# Patient Record
Sex: FEMALE | Race: BLACK OR AFRICAN AMERICAN | Employment: UNEMPLOYED | URBAN - METROPOLITAN AREA
[De-identification: names, ages, dates, MRNs, and addresses within clinical notes are randomized per-mention and may not be internally consistent; named-entity substitution may affect disease eponyms.]

---
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
## 2022-02-08 ENCOUNTER — HOSPITAL ENCOUNTER (EMERGENCY)
Age: 22
Discharge: HOME | End: 2022-02-08
Attending: EMERGENCY MEDICINE
Payer: COMMERCIAL

## 2022-02-08 VITALS
HEIGHT: 62 IN | DIASTOLIC BLOOD PRESSURE: 81 MMHG | RESPIRATION RATE: 18 BRPM | WEIGHT: 180 LBS | TEMPERATURE: 99.1 F | BODY MASS INDEX: 33.13 KG/M2 | HEART RATE: 86 BPM | OXYGEN SATURATION: 100 % | SYSTOLIC BLOOD PRESSURE: 129 MMHG

## 2022-02-08 DIAGNOSIS — N76.0 BACTERIAL VAGINITIS: ICD-10-CM

## 2022-02-08 DIAGNOSIS — B96.89 BACTERIAL VAGINITIS: ICD-10-CM

## 2022-02-08 DIAGNOSIS — B37.31 YEAST VAGINITIS: ICD-10-CM

## 2022-02-08 DIAGNOSIS — A59.9 TRICHOMONAS INFECTION: Primary | ICD-10-CM

## 2022-02-08 LAB
CANDIDA SPECIES, DNA PROBE: POSITIVE
GARDNERELLA VAGINALIS, DNA PROBE: POSITIVE
HCG(URINE) PREGNANCY TEST: NEGATIVE
SOURCE: ABNORMAL
TRICHOMONAS VAGINALIS DNA: POSITIVE

## 2022-02-08 PROCEDURE — 99283 EMERGENCY DEPT VISIT LOW MDM: CPT

## 2022-02-08 PROCEDURE — 87480 CANDIDA DNA DIR PROBE: CPT

## 2022-02-08 PROCEDURE — 87510 GARDNER VAG DNA DIR PROBE: CPT

## 2022-02-08 PROCEDURE — 87591 N.GONORRHOEAE DNA AMP PROB: CPT

## 2022-02-08 PROCEDURE — 87660 TRICHOMONAS VAGIN DIR PROBE: CPT

## 2022-02-08 PROCEDURE — 87491 CHLMYD TRACH DNA AMP PROBE: CPT

## 2022-02-08 PROCEDURE — 81025 URINE PREGNANCY TEST: CPT

## 2022-02-08 RX ORDER — FLUCONAZOLE 150 MG/1
150 TABLET ORAL ONCE
Qty: 1 TABLET | Refills: 0 | Status: SHIPPED | OUTPATIENT
Start: 2022-02-08 | End: 2022-02-08

## 2022-02-08 RX ORDER — METRONIDAZOLE 500 MG/1
500 TABLET ORAL 2 TIMES DAILY
Qty: 14 TABLET | Refills: 0 | Status: SHIPPED | OUTPATIENT
Start: 2022-02-08 | End: 2022-02-15 | Stop reason: ALTCHOICE

## 2022-02-08 ASSESSMENT — ENCOUNTER SYMPTOMS
VOMITING: 0
SHORTNESS OF BREATH: 0
NAUSEA: 0
BACK PAIN: 0
ABDOMINAL PAIN: 0
COUGH: 0
DIARRHEA: 0
RHINORRHEA: 0

## 2022-02-09 LAB
C TRACH DNA GENITAL QL NAA+PROBE: NEGATIVE
N. GONORRHOEAE DNA: NEGATIVE
SPECIMEN DESCRIPTION: NORMAL

## 2022-02-09 NOTE — ED NOTES
The following labs labeled with pt sticker and tubed to lab:     [] Blue     [] Lavender   [] on ice  [] Green/yellow  [] Green/black [] on ice  [] Yellow  [] Red  [] Pink      [] COVID-19 swab    [] Rapid  [] PCR  [] Flu swab  [] Peds Viral Panel     [x] Urine Sample  [x] Pelvic Cultures  [] Blood Cultures          [place] LPN  01/91/71 [id number]

## 2022-02-09 NOTE — ED TRIAGE NOTES
Pt arrived to ED with c/o blister on lower lip and also on vaginal region. Pt states this started about 1 month ago and has had unprotected sexual intercourse and partner recently tested positive for herpes. 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 Assessment and treatment for this patient was primarily performed by resident and attending with extended triage nurse performing tasks as directed. Pt seen primarily by resident and attending physicians. RN assessment deferred to physician assessment.

## 2022-02-09 NOTE — ED PROVIDER NOTES
Other Topics Concern    Not on file   Social History Narrative    Not on file     Social Determinants of Health     Financial Resource Strain:     Difficulty of Paying Living Expenses: Not on file   Food Insecurity:     Worried About Running Out of Food in the Last Year: Not on file    Lit of Food in the Last Year: Not on file   Transportation Needs:     Lack of Transportation (Medical): Not on file    Lack of Transportation (Non-Medical): Not on file   Physical Activity:     Days of Exercise per Week: Not on file    Minutes of Exercise per Session: Not on file   Stress:     Feeling of Stress : Not on file   Social Connections:     Frequency of Communication with Friends and Family: Not on file    Frequency of Social Gatherings with Friends and Family: Not on file    Attends Rastafarian Services: Not on file    Active Member of [address] [place] [address] or Organizations: Not on file    Attends Club or Organization Meetings: Not on file    Marital Status: Not on file   Intimate Partner Violence:     Fear of Current or Ex-Partner: Not on file    Emotionally Abused: Not on file    Physically Abused: Not on file    Sexually Abused: Not on file   Housing Stability:     Unable to Pay for Housing in the Last Year: Not on file    Number of [place] in the Last Year: Not on file    Unstable Housing in the Last Year: Not on file       History reviewed. No pertinent family history. Allergies:  Patient has no known allergies. Home Medications:  Prior to Admission medications    Medication Sig Start Date End Date Taking?  Authorizing Provider   metroNIDAZOLE (FLAGYL) 500 MG tablet Take 1 tablet by mouth 2 times daily for 7 days 2/8/22 2/15/22 Yes Sharon Glynn DO   ondansetron (ZOFRAN ODT) 4 MG disintegrating tablet Take 1 tablet by mouth every 8 hours as needed for Nausea 7/13/21   Nitesh Jim MD   ibuprofen (ADVIL;MOTRIN) 600 MG tablet Take 1 tablet by mouth every 6 hours as needed for Pain 4/21/16   Louie Agosto MD       REVIEW OFSYSTEMS    (2-9 systems for level 4, 10 or more for level 5)      Review of Systems   Constitutional: Negative for chills and fever. HENT: Positive for mouth sores. Negative for congestion and rhinorrhea. Eyes: Negative for visual disturbance. Respiratory: Negative for cough and shortness of breath. Cardiovascular: Negative for chest pain. Gastrointestinal: Negative for abdominal pain, diarrhea, nausea and vomiting. Genitourinary: Positive for genital sores. Negative for dysuria. Musculoskeletal: Negative for back pain and neck pain. Skin: Negative for rash. Neurological: Negative for weakness, numbness and headaches. PHYSICAL EXAM   (up to 7 for level 4, 8 or more forlevel 5)      INITIAL VITALS:   ED Triage Vitals   BP Temp Temp src Pulse Resp SpO2 Height Weight   -- -- -- -- -- -- -- --     Vitals:    02/08/22 1939   BP: 129/81   Pulse: 86   Resp: 18   Temp: 99.1 °F (37.3 °C)   TempSrc: Oral   SpO2: 100%   Weight: 180 lb (81.6 kg)   Height: 5' 2\" (1.575 m)       Physical Exam  Constitutional:       General: She is not in acute distress. Appearance: Normal appearance. She is normal weight. She is not ill-appearing, toxic-appearing or diaphoretic. HENT:      Head: Normocephalic and atraumatic. Nose: Nose normal.      Mouth/Throat:      Mouth: Mucous membranes are moist.      Pharynx: Oropharynx is clear. No oropharyngeal exudate or posterior oropharyngeal erythema. Eyes:      Extraocular Movements: Extraocular movements intact. Pupils: Pupils are equal, round, and reactive to light. Cardiovascular:      Rate and Rhythm: Normal rate and regular rhythm. Heart sounds: Normal heart sounds. No murmur heard. Pulmonary:      Effort: Pulmonary effort is normal. No respiratory distress. Breath sounds: Normal breath sounds. No wheezing, rhonchi or rales. Abdominal:      General: There is no distension. Palpations: Abdomen is soft. Tenderness: There is no abdominal tenderness. There is no guarding or rebound. Genitourinary:     Comments: Pelvic exam performed with RN and attending in the room. 3 small areas of induration noted on the posterior labia bilaterally. Mild tenderness when pushing on these. No obvious ulcer or drainage. Moderate amount of thick white discharge noted in the vaginal canal.  Cervix is normal.  No other abnormal findings. Musculoskeletal:         General: No tenderness. Normal range of motion. Cervical back: Normal range of motion and neck supple. Right lower leg: No edema. Left lower leg: No edema. Skin:     General: Skin is warm and dry. Neurological:      General: No focal deficit present. Mental Status: She is alert and oriented to person, place, and time. Motor: No weakness. Psychiatric:         Mood and Affect: Mood normal.         DIFFERENTIAL  DIAGNOSIS     PLAN (LABS / IMAGING / EKG):  Orders Placed This Encounter   Procedures    C.trachomatis N.gonorrhoeae DNA    VAGINITIS DNA PROBE    PREGNANCY, URINE       MEDICATIONS ORDERED:  Orders Placed This Encounter   Medications    metroNIDAZOLE (FLAGYL) 500 MG tablet     Sig: Take 1 tablet by mouth 2 times daily for 7 days     Dispense:  14 tablet     Refill:  0    fluconazole (DIFLUCAN) 150 MG tablet     Sig: Take 1 tablet by mouth once for 1 dose     Dispense:  1 tablet     Refill:  0     Initial MDM/Plan/ED COURSE:    25 y.o. female who presents with concerns of vaginal lesions and on the lip. On exam, patient is in no acute distress and vitals are stable and within normal limits. Abdomen soft and nontender. Heart and lung exam unremarkable. HEENT exam notable for small ulcer-like area on the right corner of the lips. This is nonpainful. Small amount of crusting overlying. No intraoral lesions.   Mild tonsillar hypertrophy appears to be normal variant, no signs of erythema or exudates. No associated sore throat to suggest bacterial infection. Pelvic exam was performed and lesions do not appear to be herpes. These appear to be more like folliculitis and there are only 2 or 3. No ulcerative lesions. No lesions within the vaginal canal.  Vaginal swab sent. ED Course as of 02/09/22 1315   Tue Feb 08, 2022 2024 HCG(Urine) Pregnancy Test: NEGATIVE [JS]      ED Course User Index  [JS] Hailey Long DO      Patient updated on laboratory findings including positive test for trichomonas, bacterial vaginosis, and yeast.  We discussed appropriate treatment including 1 week of Flagyl followed by fluconazole. We also discussed following up on gonorrhea and chlamydia test.  She deferred prophylactic treatment today. We also discussed the necessary treatment of sexual partners. She expressed agreement and questions were answered. Patient discharged home in stable condition. DIAGNOSTIC RESULTS / EMERGENCYDEPARTMENT COURSE / MDM     LABS:  Labs Reviewed   VAGINITIS DNA PROBE - Abnormal; Notable for the following components:       Result Value    Trichomonas Vaginalis DNA POSITIVE (*)     GARDNERELLA VAGINALIS, DNA PROBE POSITIVE (*)     CANDIDA SPECIES, DNA PROBE POSITIVE (*)     All other components within normal limits   C.TRACHOMATIS N.GONORRHOEAE DNA   PREGNANCY, URINE           No results found. EKG      All EKG's are interpreted by the Emergency Department Physicianwho either signs or Co-signs this chart in the absence of a cardiologist.      PROCEDURES:  None    CONSULTS:  None    CRITICAL CARE:  Please see attending note    FINAL IMPRESSION      1. Trichomonas infection    2. Yeast vaginitis    3.  Bacterial vaginitis          DISPOSITION / PLAN     DISPOSITION Decision To Discharge 02/08/2022 09:11:33 PM      PATIENT REFERRED TO:  [facility]  [address] [place] [address]  [phone]    If symptoms worsen      DISCHARGE MEDICATIONS:  Discharge Medication List as of 2/8/2022  9:15 PM      START taking these medications    Details   metroNIDAZOLE (FLAGYL) 500 MG tablet Take 1 tablet by mouth 2 times daily for 7 days, Disp-14 tablet, R-0Print      fluconazole (DIFLUCAN) 150 MG tablet Take 1 tablet by mouth once for 1 dose, Disp-1 tablet, R-0Print             Chaz Fraire DO  Emergency Medicine Resident    (Please note that portions of this note were completed with a voice recognition program.Efforts were made to edit the dictations but occasionally words are mis-transcribed.)       Chaz Fraire DO  Resident  02/09/22 0436

## 2022-02-09 NOTE — ED PROVIDER NOTES
[facility] ED     Emergency Department     Faculty Attestation    I performed a history and physical examination of the patient and discussed management with the resident. I reviewed the residents note and agree with the documented findings and plan of care. Any areas of disagreement are noted on the chart. I was personally present for the key portions of any procedures. I have documented in the chart those procedures where I was not present during the key portions. I have reviewed the emergency nurses triage note. I agree with the chief complaint, past medical history, past surgical history, allergies, medications, social and family history as documented unless otherwise noted below. For Physician Assistant/ Nurse Practitioner cases/documentation I have personally evaluated this patient and have completed at least one if not all key elements of the E/M (history, physical exam, and MDM). Additional findings are as noted. Presents with a couple of lesions to her groin and labia area as well as the lesion to her right upper lip. She says that the groin lesions have been there for over 2 months. She says that they are not painful. She says that the lip lesion is not painful either. Patient says that her significant other has been diagnosed with herpes. On exam, patient is resting comfortably in the bed. There is a small ulcer seen to the right upper lip at the corner. Patient does have dry lips. No lesions seen to the oropharynx. There are also 2 ulcerative type lesions to the labia majora on the right side. No vesicles. These appear consistent with a folliculitis. Patient is requesting testing for gonorrhea and chlamydia. Will await those labs.       Starr Grande MD  Attending Emergency  Physician              Laura Ramos MD  02/08/22 [id number]

## 2022-02-15 ENCOUNTER — OFFICE VISIT (OUTPATIENT)
Dept: INTERNAL MEDICINE CLINIC | Age: 22
End: 2022-02-15
Payer: COMMERCIAL

## 2022-02-15 VITALS
HEIGHT: 62 IN | HEART RATE: 80 BPM | WEIGHT: 172 LBS | BODY MASS INDEX: 31.65 KG/M2 | SYSTOLIC BLOOD PRESSURE: 102 MMHG | DIASTOLIC BLOOD PRESSURE: 80 MMHG | OXYGEN SATURATION: 98 %

## 2022-02-15 DIAGNOSIS — Z11.59 NEED FOR HEPATITIS C SCREENING TEST: ICD-10-CM

## 2022-02-15 DIAGNOSIS — A59.9 TRICHOMONAS INFECTION: ICD-10-CM

## 2022-02-15 DIAGNOSIS — R53.83 FATIGUE, UNSPECIFIED TYPE: ICD-10-CM

## 2022-02-15 DIAGNOSIS — Z11.4 ENCOUNTER FOR SCREENING FOR HIV: ICD-10-CM

## 2022-02-15 DIAGNOSIS — Z87.09 HISTORY OF STREP SORE THROAT: ICD-10-CM

## 2022-02-15 DIAGNOSIS — J35.1 ENLARGED TONSILS: ICD-10-CM

## 2022-02-15 DIAGNOSIS — R73.09 ELEVATED GLUCOSE: ICD-10-CM

## 2022-02-15 DIAGNOSIS — Z76.89 ENCOUNTER TO ESTABLISH CARE: Primary | ICD-10-CM

## 2022-02-15 DIAGNOSIS — Z86.2 HISTORY OF ANEMIA: ICD-10-CM

## 2022-02-15 PROCEDURE — G8484 FLU IMMUNIZE NO ADMIN: HCPCS | Performed by: NURSE PRACTITIONER

## 2022-02-15 PROCEDURE — G8417 CALC BMI ABV UP PARAM F/U: HCPCS | Performed by: NURSE PRACTITIONER

## 2022-02-15 PROCEDURE — 99203 OFFICE O/P NEW LOW 30 MIN: CPT | Performed by: NURSE PRACTITIONER

## 2022-02-15 PROCEDURE — 1036F TOBACCO NON-USER: CPT | Performed by: NURSE PRACTITIONER

## 2022-02-15 PROCEDURE — G8427 DOCREV CUR MEDS BY ELIG CLIN: HCPCS | Performed by: NURSE PRACTITIONER

## 2022-02-15 SDOH — ECONOMIC STABILITY: FOOD INSECURITY: WITHIN THE PAST 12 MONTHS, YOU WORRIED THAT YOUR FOOD WOULD RUN OUT BEFORE YOU GOT MONEY TO BUY MORE.: SOMETIMES TRUE

## 2022-02-15 SDOH — ECONOMIC STABILITY: FOOD INSECURITY: WITHIN THE PAST 12 MONTHS, THE FOOD YOU BOUGHT JUST DIDN'T LAST AND YOU DIDN'T HAVE MONEY TO GET MORE.: SOMETIMES TRUE

## 2022-02-15 ASSESSMENT — ENCOUNTER SYMPTOMS
COUGH: 0
ABDOMINAL PAIN: 0
COLOR CHANGE: 0
NAUSEA: 0
CONSTIPATION: 0
SORE THROAT: 0
WHEEZING: 0
TROUBLE SWALLOWING: 0
DIARRHEA: 0
EYE DISCHARGE: 0
BACK PAIN: 0
SHORTNESS OF BREATH: 0

## 2022-02-15 ASSESSMENT — PATIENT HEALTH QUESTIONNAIRE - PHQ9
SUM OF ALL RESPONSES TO PHQ9 QUESTIONS 1 & 2: 0
SUM OF ALL RESPONSES TO PHQ QUESTIONS 1-9: 0
2. FEELING DOWN, DEPRESSED OR HOPELESS: 0
SUM OF ALL RESPONSES TO PHQ QUESTIONS 1-9: 0
1. LITTLE INTEREST OR PLEASURE IN DOING THINGS: 0

## 2022-02-15 ASSESSMENT — SOCIAL DETERMINANTS OF HEALTH (SDOH): HOW HARD IS IT FOR YOU TO PAY FOR THE VERY BASICS LIKE FOOD, HOUSING, MEDICAL CARE, AND HEATING?: SOMEWHAT HARD

## 2022-02-15 NOTE — Clinical Note
Erica Nur, I'm sorry but can you please contact patient and ask her to schedule follow up in 3 months?  Thanks

## 2022-02-15 NOTE — PROGRESS NOTES
Visit Information    Have you changed or started any medications since your last visit including any over-the-counter medicines, vitamins, or herbal medicines? no   Are you having any side effects from any of your medications? -  no  Have you stopped taking any of your medications? Is so, why? -  no    Have you seen any other physician or provider since your last visit? No  Have you had any other diagnostic tests since your last visit? No  Have you been seen in the emergency room and/or had an admission to a hospital since we last saw you? Yes - Records Requested  Have you had your routine dental cleaning in the past 6 months? no    Have you activated your [employer] account? If not, what are your barriers?  No: pending     Patient Care Team:  JAM Woody CNP as PCP - General (Internal Medicine)    Medical History Review  Past Medical, Family, and Social History reviewed and does contribute to the patient presenting condition    Health Maintenance   Topic Date Due    Hepatitis C screen  Never done    Varicella vaccine (1 of 2 - 2-dose childhood series) Never done    COVID-19 Vaccine (1) Never done    HPV vaccine (1 - 2-dose series) Never done    Depression Screen  Never done    A1C test (Diabetic or Prediabetic)  04/27/2017    HIV screen  Never done    DTaP/Tdap/Td vaccine (1 - Tdap) Never done    Flu vaccine (1) Never done    Chlamydia screen  02/08/2023    Hepatitis A vaccine  Aged Out    Hepatitis B vaccine  Aged Out    Hib vaccine  Aged Out    Meningococcal (ACWY) vaccine  Aged Out    Pneumococcal 0-64 years Vaccine  Aged Out         [id number] Patient Note/History and Physical    Date of patient's visit: 2/15/2022     Name:  Patricia Morales      YOB: 2000    Patient Care Team:  JAM Woody CNP as PCP - General (Internal Medicine)    REASON FOR VISIT: First Visit, establish care   Chief Complaint   Patient presents with    New Patient     Establish care        HISTORY OF PRESENTING ILLNESS:    History was obtained from the patient. Ramiro Rodriges is a 25 y.o. is here to establish care. States she hasn't had PCP since age 17. Recently in ER and treated for yeast infection, BV, trichomonas, states she has completed course of Flagyl. Currently sexually, no birth control. Not planning to get pregnant but also does not want to prevent it. Diet-no pop, fast food once a month  Exercise- yoga 30 min daily. PAST MEDICAL AND SURGICAL HISTORY:          Diagnosis Date    BV (bacterial vaginosis)     Chlamydia infection 07/2021    Closed fracture of phalanx of middle finger 2016    Gonorrhea 07/2021    Seasonal allergies     Strep throat     Trichomonas infection 02/2022       History reviewed. No pertinent surgical history. SOCIAL HISTORY:    TOBACCO:   reports that she is a non-smoker but has been exposed to tobacco smoke. She has never used smokeless tobacco.  ETOH:   reports no history of alcohol use. DRUGS:  reports no history of drug use. OCCUPATION:      ALLERGIES:    No Known Allergies      HOME MEDICATION:      Current Outpatient Medications on File Prior to Visit   Medication Sig Dispense Refill    ibuprofen (ADVIL;MOTRIN) 600 MG tablet Take 1 tablet by mouth every 6 hours as needed for Pain 30 tablet 0     No current facility-administered medications on file prior to visit. FAMILY HISTORY:          Problem Relation Age of Onset    Pancreatic Cancer Father     Arthritis Maternal Grandmother     Heart Attack Maternal Grandmother     Stroke Maternal Grandfather     Diabetes Maternal Grandfather        REVIEW OF SYSTEMS:    Review of Systems   Constitutional: Positive for fatigue. Negative for chills and fever. HENT: Negative for congestion, ear pain, sore throat and trouble swallowing. States she has history of strep throat, usually every spring   Eyes: Negative for discharge and visual disturbance.    Respiratory: Negative for cough, shortness of breath and wheezing. Cardiovascular: Negative for chest pain, palpitations and leg swelling. Gastrointestinal: Negative for abdominal pain, constipation, diarrhea and nausea. Genitourinary: Negative for difficulty urinating, dysuria and vaginal discharge. LMP started 2/13 - heavier than usual  Treated for BV, trichomonas last week   Musculoskeletal: Negative for arthralgias, back pain and gait problem. Skin: Negative for color change and rash. Allergic/Immunologic: Positive for environmental allergies. Pollen, grass   Neurological: Negative for dizziness and headaches. Psychiatric/Behavioral: Positive for sleep disturbance. The patient is not nervous/anxious. 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 Appearance: Normal appearance. She is well-developed. HENT:      Head: Normocephalic and atraumatic. Right Ear: Tympanic membrane and external ear normal.      Left Ear: Tympanic membrane and external ear normal.      Nose: Nose normal.      Mouth/Throat:      Mouth: Mucous membranes are moist.      Pharynx: Oropharynx is clear. Tonsils: 3+ on the right. 3+ on the left. Eyes:      General:         Right eye: No discharge. Left eye: No discharge. Conjunctiva/sclera: Conjunctivae normal.      Pupils: Pupils are equal, round, and reactive to light. Neck:      Thyroid: No thyromegaly. Cardiovascular:      Rate and Rhythm: Normal rate and regular rhythm. Pulses: Normal pulses. Heart sounds: Normal heart sounds. No murmur heard. Pulmonary:      Effort: Pulmonary effort is normal.      Breath sounds: Normal breath sounds. No wheezing. Abdominal:      General: Bowel sounds are normal. There is no distension. Palpations: Abdomen is soft. Tenderness: There is no abdominal tenderness. Musculoskeletal:      Cervical back: Normal range of motion and neck supple. No tenderness. Normal range of motion. Thoracic back: No deformity or tenderness. Lumbar back: No deformity or tenderness. Normal range of motion. Right lower leg: No edema. Left lower leg: No edema. Lymphadenopathy:      Cervical: No cervical adenopathy. Skin:     General: Skin is warm and dry. Findings: No rash. Neurological:      Mental Status: She is alert and oriented to person, place, and time.       Gait: Gait normal.      Deep Tendon Reflexes: Reflexes normal.   Psychiatric:         Mood and Affect: Mood normal.         Behavior: Behavior normal.          LABORATORYFINDINGS:    CBC:   Lab Results   Component Value Date    WBC 13.2 07/16/2021    HGB 7.3 07/16/2021     07/16/2021     BMP:   Lab Results   Component Value Date     07/16/2021    K 3.8 07/16/2021     07/16/2021    CO2 26 07/16/2021    BUN 10 07/16/2021    CREATININE 0.51 07/16/2021    GLUCOSE 121 07/16/2021     Hemoglobin A1C:   Lab Results   Component Value Date    LABA1C 5.7 04/27/2016     Lipid profile:   Lab Results   Component Value Date    CHOL 161 04/27/2016    TRIG 150 04/27/2016    HDL 29 04/27/2016     Thyroid functions: No results found for: TSH   Hepatic functions:   Lab Results   Component Value Date    ALT 11 07/16/2021    AST 12 07/16/2021    PROT 6.1 07/16/2021    BILITOT <0.15 07/16/2021    LABALBU 3.6 07/16/2021       ASSESSMENT AND PLAN:     Visit Diagnoses and Associated Orders     Encounter to establish care    -  Primary         Elevated glucose        Comprehensive Metabolic Panel [33969 Custom]   - Future Order    Hemoglobin A1C [[id number] Custom]   - Future Order         Encounter for screening for HIV        HIV Screen [[id number] Custom]   - Future Order         Need for hepatitis C screening test        Hepatitis C Antibody [[id number] Custom]   - Future Order         History of anemia        CBC Auto Differential [[id number] Custom]   - Future Order         Fatigue, unspecified type        TSH with Reflex [[id number] Custom]   - Future Order         Trichomonas infection        S/P treatment with Flagyl, will need follow up within 3 months to repeat testing         Enlarged tonsils        Consider ENT referral         History of strep sore throat                    INSTRUCTIONS:     Return in about 3 months (around 5/15/2022) for test result review, or sooner if needed. · Hodan received counseling on the following healthy behaviors: nutrition and exercise    · Reviewed prior labs and health maintenance. Patient to check with mom regarding vaccine status. · All patient questions answered. Pt voiced understanding.      · Patient given educational materials - see patient instructions    JAM Brown - CNP     2/15/2022, 4:12 PM

## 2022-03-30 ENCOUNTER — HOSPITAL ENCOUNTER (EMERGENCY)
Age: 22
Discharge: HOME | End: 2022-03-30
Attending: EMERGENCY MEDICINE
Payer: COMMERCIAL

## 2022-03-30 VITALS
HEART RATE: 101 BPM | DIASTOLIC BLOOD PRESSURE: 82 MMHG | RESPIRATION RATE: 18 BRPM | BODY MASS INDEX: 32.01 KG/M2 | SYSTOLIC BLOOD PRESSURE: 119 MMHG | WEIGHT: 175 LBS | OXYGEN SATURATION: 100 % | TEMPERATURE: 99.1 F

## 2022-03-30 DIAGNOSIS — N76.0 BACTERIAL VAGINOSIS: Primary | ICD-10-CM

## 2022-03-30 DIAGNOSIS — B96.89 BACTERIAL VAGINOSIS: Primary | ICD-10-CM

## 2022-03-30 LAB
ABSOLUTE EOS #: 0 K/UL (ref 0–0.4)
ABSOLUTE IMMATURE GRANULOCYTE: 0 K/UL (ref 0–0.3)
ABSOLUTE LYMPH #: 2.55 K/UL (ref 1.2–5.2)
ABSOLUTE MONO #: 0.29 K/UL (ref 0.1–1.4)
BASOPHILS # BLD: 0 % (ref 0–2)
BASOPHILS ABSOLUTE: 0 K/UL (ref 0–0.2)
BILIRUBIN URINE: NEGATIVE
CANDIDA SPECIES, DNA PROBE: NEGATIVE
COLOR: YELLOW
COMMENT UA: NORMAL
EOSINOPHILS RELATIVE PERCENT: 0 % (ref 1–4)
GARDNERELLA VAGINALIS, DNA PROBE: POSITIVE
GLUCOSE URINE: NEGATIVE
HCG QUANTITATIVE: <1 MIU/ML
HCT VFR BLD CALC: 36 % (ref 36.3–47.1)
HEMOGLOBIN: 11.3 G/DL (ref 11.9–15.1)
IMMATURE GRANULOCYTES: 0 %
KETONES, URINE: NEGATIVE
LEUKOCYTE ESTERASE, URINE: NEGATIVE
LYMPHOCYTES # BLD: 26 % (ref 25–45)
MCH RBC QN AUTO: 21.9 PG (ref 25.2–33.5)
MCHC RBC AUTO-ENTMCNC: 31.4 G/DL (ref 28.4–34.8)
MCV RBC AUTO: 69.9 FL (ref 82.6–102.9)
MONOCYTES # BLD: 3 % (ref 2–8)
MORPHOLOGY: ABNORMAL
MORPHOLOGY: ABNORMAL
NITRITE, URINE: NEGATIVE
NRBC AUTOMATED: 0 PER 100 WBC
PDW BLD-RTO: 18.2 % (ref 11.8–14.4)
PH UA: 7.5 (ref 5–8)
PLATELET # BLD: 323 K/UL (ref 138–453)
PMV BLD AUTO: 10 FL (ref 8.1–13.5)
PROTEIN UA: NEGATIVE
RBC # BLD: 5.15 M/UL (ref 3.95–5.11)
SEG NEUTROPHILS: 71 % (ref 34–64)
SEGMENTED NEUTROPHILS ABSOLUTE COUNT: 6.96 K/UL (ref 1.8–8)
SOURCE: ABNORMAL
SPECIFIC GRAVITY UA: 1.01 (ref 1–1.03)
TRICHOMONAS VAGINALIS DNA: NEGATIVE
TURBIDITY: CLEAR
URINE HGB: NEGATIVE
UROBILINOGEN, URINE: NORMAL
WBC # BLD: 9.8 K/UL (ref 4.5–13.5)

## 2022-03-30 PROCEDURE — 99285 EMERGENCY DEPT VISIT HI MDM: CPT

## 2022-03-30 PROCEDURE — 87480 CANDIDA DNA DIR PROBE: CPT

## 2022-03-30 PROCEDURE — 85025 COMPLETE CBC W/AUTO DIFF WBC: CPT

## 2022-03-30 PROCEDURE — 87591 N.GONORRHOEAE DNA AMP PROB: CPT

## 2022-03-30 PROCEDURE — 87660 TRICHOMONAS VAGIN DIR PROBE: CPT

## 2022-03-30 PROCEDURE — 87491 CHLMYD TRACH DNA AMP PROBE: CPT

## 2022-03-30 PROCEDURE — 81003 URINALYSIS AUTO W/O SCOPE: CPT

## 2022-03-30 PROCEDURE — 6370000000 HC RX 637 (ALT 250 FOR IP)

## 2022-03-30 PROCEDURE — 87510 GARDNER VAG DNA DIR PROBE: CPT

## 2022-03-30 PROCEDURE — 84702 CHORIONIC GONADOTROPIN TEST: CPT

## 2022-03-30 RX ORDER — ACETAMINOPHEN 500 MG
1000 TABLET ORAL EVERY 8 HOURS PRN
Qty: 180 TABLET | Refills: 1 | Status: SHIPPED | OUTPATIENT
Start: 2022-03-30 | End: 2022-09-21

## 2022-03-30 RX ORDER — METRONIDAZOLE 500 MG/1
500 TABLET ORAL 2 TIMES DAILY
Qty: 14 TABLET | Refills: 0 | Status: SHIPPED | OUTPATIENT
Start: 2022-03-30 | End: 2022-04-06

## 2022-03-30 RX ORDER — IBUPROFEN 600 MG/1
600 TABLET ORAL EVERY 6 HOURS PRN
Qty: 30 TABLET | Refills: 1 | Status: SHIPPED | OUTPATIENT
Start: 2022-03-30 | End: 2022-05-12 | Stop reason: HOSPADM

## 2022-03-30 RX ORDER — ACETAMINOPHEN 500 MG
1000 TABLET ORAL ONCE
Status: COMPLETED | OUTPATIENT
Start: 2022-03-30 | End: 2022-03-30

## 2022-03-30 RX ADMIN — ACETAMINOPHEN 1000 MG: 500 TABLET ORAL at 14:58

## 2022-03-30 ASSESSMENT — ENCOUNTER SYMPTOMS
NAUSEA: 1
ABDOMINAL PAIN: 1
EYES NEGATIVE: 1
RESPIRATORY NEGATIVE: 1
ALLERGIC/IMMUNOLOGIC NEGATIVE: 1

## 2022-03-30 ASSESSMENT — PAIN DESCRIPTION - PAIN TYPE: TYPE: ACUTE PAIN

## 2022-03-30 ASSESSMENT — PAIN SCALES - GENERAL
PAINLEVEL_OUTOF10: 4
PAINLEVEL_OUTOF10: 7
PAINLEVEL_OUTOF10: 6
PAINLEVEL_OUTOF10: 6

## 2022-03-30 ASSESSMENT — PAIN DESCRIPTION - FREQUENCY
FREQUENCY: CONTINUOUS
FREQUENCY: CONTINUOUS

## 2022-03-30 ASSESSMENT — PAIN DESCRIPTION - DESCRIPTORS
DESCRIPTORS: PRESSURE
DESCRIPTORS: PRESSURE

## 2022-03-30 ASSESSMENT — PAIN DESCRIPTION - PROGRESSION: CLINICAL_PROGRESSION: NOT CHANGED

## 2022-03-30 ASSESSMENT — PAIN DESCRIPTION - LOCATION
LOCATION: ABDOMEN
LOCATION: ABDOMEN

## 2022-03-30 ASSESSMENT — PAIN DESCRIPTION - ORIENTATION: ORIENTATION: RIGHT;LEFT;LOWER

## 2022-03-30 ASSESSMENT — PAIN - FUNCTIONAL ASSESSMENT: PAIN_FUNCTIONAL_ASSESSMENT: 0-10

## 2022-03-30 NOTE — ED PROVIDER NOTES
[facility]  ED  Emergency Department Encounter  EmergencyMedicine Resident     Pt Name:Hodan Horn  MRN: [id number]  [place] 2000  Date of evaluation: 3/30/22  PCP:  JAM Barron CNP    This patient was evaluated in the Emergency Department for symptoms described in the history of present illness. The patient was evaluated in the context of the global COVID-19 pandemic, which necessitated consideration that the patient might be at risk for infection with the SARS-CoV-2 virus that causes COVID-19. Institutional protocols and algorithms that pertain to the evaluation of patients at risk for COVID-19 are in a state of rapid change based on information released by regulatory bodies including the CDC and federal and state organizations. These policies and algorithms were followed during the patient's care in the ED. CHIEF COMPLAINT       Chief Complaint   Patient presents with    Dizziness    Nausea    Abdominal Pain     HISTORY OF PRESENT ILLNESS  (Location/Symptom, Timing/Onset, Context/Setting, Quality, Duration, Modifying Factors, Severity.)      Tom Aguiar is a 25 y.o. female with a past history of gonorrhea/chlamdyai and trichomonas who presents with complaints of nausea with associated bilateral lower abdominal pain for the past 2 to 3 weeks. Patient states that the pain is constant. She has not vomited, but feels generally unwell. She denies any fevers/chills, chest pain,  shortness of breath, back pain, dysuria or abnormal vaginal discharge. Patient states that she had her last menstrual period the first week of February and has not had a period since then. She does not currently use condoms or other form of contraceptive methods to protect against pregnancy or STDs. Patient does state that there is a possibility that she can be pregnant. She is amenable to STD testing today and is agreeable to obtaining an hCG level while here.     PAST MEDICAL / SURGICAL / SOCIAL / FAMILY HISTORY      has a past medical history of BV (bacterial vaginosis), Chlamydia infection, Closed fracture of phalanx of middle finger, Gonorrhea, Seasonal allergies, Strep throat, and Trichomonas infection. has no past surgical history on file. Social History     Socioeconomic History    Marital status: Single     Spouse name: Not on file    Number of children: Not on file    Years of education: Not on file    Highest education level: Not on file   Occupational History    Not on file   Tobacco Use    Smoking status: Passive Smoke Exposure - Never Smoker    Smokeless tobacco: Never Used   Substance and Sexual Activity    Alcohol use: Never    Drug use: Never    Sexual activity: Not on file   Other Topics Concern    Not on file   Social History Narrative    Not on file     Social Determinants of Health     Financial Resource Strain: Medium Risk    Difficulty of Paying Living Expenses: Somewhat hard   Food Insecurity: Food Insecurity Present    Worried About Running Out of Food in the Last Year: Sometimes true    Lit of Food in the Last Year: Sometimes true   Transportation Needs:     Lack of Transportation (Medical): Not on file    Lack of Transportation (Non-Medical):  Not on file   Physical Activity:     Days of Exercise per Week: Not on file    Minutes of Exercise per Session: Not on file   Stress:     Feeling of Stress : Not on file   Social Connections:     Frequency of Communication with Friends and Family: Not on file    Frequency of Social Gatherings with Friends and Family: Not on file    Attends Zoroastrianism Services: Not on file    Active Member of Clubs or Organizations: Not on file    Attends Club or Organization Meetings: Not on file    Marital Status: Not on file   Intimate Partner Violence:     Fear of Current or Ex-Partner: Not on file    Emotionally Abused: Not on file    Physically Abused: Not on file    Sexually Abused: Not on file   Housing equal, round, and reactive to light. Cardiovascular:      Rate and Rhythm: Normal rate. Heart sounds: Normal heart sounds. Pulmonary:      Effort: Pulmonary effort is normal.   Abdominal:      General: Abdomen is flat. Bowel sounds are normal.      Tenderness: There is no right CVA tenderness, left CVA tenderness, guarding or rebound. Negative signs include Rovsing's sign and McBurney's sign. Comments: Patient resting comfortably in bed. Initial examination showed tenderness of LLQ and RLQ. No guarding, rebound or rigidity present. Repeat exam after Tylenol, patient still resting comfortably. Denied any abdominal pain and nontender in all quadrants. Genitourinary:     Comments: Thin white discharge in the posterior vaginal vault. No abnormal cervical masses or lesions. No mucopurulent discharge noted on cervical examination. Skin:     General: Skin is warm. Capillary Refill: Capillary refill takes less than 2 seconds. Neurological:      General: No focal deficit present. Mental Status: She is alert.    Psychiatric:         Mood and Affect: Mood normal.       DIFFERENTIAL  DIAGNOSIS     PLAN (LABS / IMAGING / EKG):  Orders Placed This Encounter   Procedures    C.trachomatis N.gonorrhoeae DNA    VAGINITIS DNA PROBE    HCG, Quantitative, Pregnancy    CBC with Auto Differential    Urinalysis with Reflex to Culture     MEDICATIONS ORDERED:  Orders Placed This Encounter   Medications    acetaminophen (TYLENOL) tablet 1,000 mg    metroNIDAZOLE (FLAGYL) 500 MG tablet     Sig: Take 1 tablet by mouth 2 times daily for 7 days     Dispense:  14 tablet     Refill:  0    ibuprofen (ADVIL;MOTRIN) 600 MG tablet     Sig: Take 1 tablet by mouth every 6 hours as needed for Pain     Dispense:  30 tablet     Refill:  1    acetaminophen (TYLENOL) 500 MG tablet     Sig: Take 2 tablets by mouth every 8 hours as needed for Pain     Dispense:  180 tablet     Refill:  1     DDX: Lower abdominal pain, rule out ectopic pregnancy vs PID vs appendicitis    DIAGNOSTIC RESULTS / EMERGENCY DEPARTMENT COURSE / MDM   LAB RESULTS:  Results for orders placed or performed during the hospital encounter of 03/30/22   VAGINITIS DNA PROBE    Specimen: Vaginal   Result Value Ref Range    Source . VAGINAL SWAB     Trichomonas Vaginalis DNA NEGATIVE NEGATIVE    GARDNERELLA VAGINALIS, DNA PROBE POSITIVE (A) NEGATIVE    CANDIDA SPECIES, DNA PROBE NEGATIVE NEGATIVE   HCG, Quantitative, Pregnancy   Result Value Ref Range    hCG Quant <1 <5 mIU/mL   CBC with Auto Differential   Result Value Ref Range    WBC 9.8 4.5 - 13.5 k/uL    RBC 5.15 (H) 3.95 - 5.11 m/uL    Hemoglobin 11.3 (L) 11.9 - 15.1 g/dL    Hematocrit 36.0 (L) 36.3 - 47.1 %    MCV 69.9 (L) 82.6 - 102.9 fL    MCH 21.9 (L) 25.2 - 33.5 pg    MCHC 31.4 28.4 - 34.8 g/dL    RDW 18.2 (H) 11.8 - 14.4 %    Platelets 583 661 - 809 k/uL    MPV 10.0 8.1 - 13.5 fL    NRBC Automated 0.0 0.0 per 100 WBC    Immature Granulocytes 0 0 %    Seg Neutrophils 71 (H) 34 - 64 %    Lymphocytes 26 25 - 45 %    Monocytes 3 2 - 8 %    Eosinophils % 0 (L) 1 - 4 %    Basophils 0 0 - 2 %    Absolute Immature Granulocyte 0.00 0.00 - 0.30 k/uL    Segs Absolute 6.96 1.8 - 8.0 k/uL    Absolute Lymph # 2.55 1.2 - 5.2 k/uL    Absolute Mono # 0.29 0.1 - 1.4 k/uL    Absolute Eos # 0.00 0.0 - 0.4 k/uL    Basophils Absolute 0.00 0.0 - 0.2 k/uL    Morphology ANISOCYTOSIS PRESENT     Morphology MICROCYTOSIS PRESENT    Urinalysis with Reflex to Culture    Specimen: Urine   Result Value Ref Range    Color, UA Yellow Yellow    Turbidity UA Clear Clear    Glucose, Ur NEGATIVE NEGATIVE    Bilirubin Urine NEGATIVE NEGATIVE    Ketones, Urine NEGATIVE NEGATIVE    Specific Gravity, UA 1.011 1.005 - 1.030    Urine Hgb NEGATIVE NEGATIVE    pH, UA 7.5 5.0 - 8.0    Protein, UA NEGATIVE NEGATIVE    Urobilinogen, Urine Normal Normal    Nitrite, Urine NEGATIVE NEGATIVE    Leukocyte Esterase, Urine NEGATIVE NEGATIVE    Urinalysis Comments       Microscopic exam not performed based on chemical results unless requested in original order. IMPRESSION: Patient is a 72-year-old female with past medical history of trichomonas, gonorrhea, chlamydia who presents with nausea with bilateral lower abdominal pain for 2 to 3 weeks. Patient has not had a menstrual period since the first week in February. She denies any back pain, dysuria, fevers/chills,  vomiting and abnormal vaginal discharge. Normal-appearing cervix with foul-smelling vaginal discharge in the posterior vaginal vault. Plan to rule out pregnancy with serum hCG. Gc/C and Vaginitis collected results pending. Urinalysis with reflex to culture ordered to rule out UTI. Will obtain CBC due to concern for PID. RADIOLOGY:  None    EMERGENCY DEPARTMENT COURSE:  ED Course as of 03/30/22 1702   Wed Mar 30, 2022   1558 Patient reassess after getting Tylenol. Pain much improved. Abdominal exam repeated and patient non tender. Denies any pain at this time. Patient still VSS, afebrile. Labs reviewed. HCG negative. No leukocytosis on CBC. Vaginitis positive for BV. Will await UA with reflux. [LC]   1642 Patient assessed. Denies any abd pain or nausea since receiving Tylenol. No complaints at this time. UA unremarkable and low suspicious for UTI. Low suspicion for PID at this time as patient comfortable, not in any pain at this time (initial pain resolved with one time dose of tylenol). VSS, afebrile. No leukocytosis on CBC. Gc/C still pending. Discussed empiric treatment for Gc/C. Patient has low concern and prefers to await results. She plans on following with PCP if results positive. Patient given strict return precautions. She is requesting a Rx for Motrin/Tylenol PRN. Rx for Flagyl 500 BID for 7 days given to patient for BV tx. All questions answered and patient expressed understanding.   [LC]      ED Course User Index  [LC] Domenico Tolbert MD PROCEDURES:  None    CONSULTS:  None    CRITICAL CARE:  Please see attending physician note    FINAL IMPRESSION      1.  Bacterial vaginosis          DISPOSITION / PLAN     DISPOSITION      PATIENT REFERRED TO:  JAM Albrecht - CNP  [address] [facility]  [phone]    Schedule an appointment as soon as possible for a visit in 1 week  ED follow up and, As needed    DISCHARGE MEDICATIONS:  Discharge Medication List as of 3/30/2022  4:51 PM      START taking these medications    Details   metroNIDAZOLE (FLAGYL) 500 MG tablet Take 1 tablet by mouth 2 times daily for 7 days, Disp-14 tablet, R-0Print      acetaminophen (TYLENOL) 500 MG tablet Take 2 tablets by mouth every 8 hours as needed for Pain, Disp-180 tablet, R-1Print           Obey England MD  Emergency Medicine Resident    (Please note that portions of thisnote were completed with a voice recognition program.  Efforts were made to edit the dictations but occasionally words are mis-transcribed.)        Obey England MD  Resident  03/30/22 9042       Obey England MD  Resident  03/30/22 8223       Obey England MD  Resident  03/30/22 1323

## 2022-03-30 NOTE — ED NOTES
Pt respirations are even and unlabored, pt is oriented X 4, speaking in complete sentences, bed is in the lowest position, call light is within reach. Will continue to monitor.        Fabi Quevedo RN  03/30/22 3584

## 2022-03-30 NOTE — ED TRIAGE NOTES
Patient presented to the ED today with complaints of dizziness, nausea and abdominal pain. Patient states that symptoms presented 3 weeks ago.
Pt ambulated to 48 with co RLQ and LLQ pain, 6/10, pressure quality, onset 2 weeks ago, worsening today. Hx of 2 miscarriages. Pt states she thinks she may be pregnant. Pt respirations are even and unlabored, pt is oriented X 4, speaking in complete sentences, bed is in the lowest position, call light is within reach. Will continue to monitor.
Edgardo Cheney)

## 2022-03-30 NOTE — ED PROVIDER NOTES
[facility]     Emergency Department     Faculty Attestation        I performed a history and physical examination of the patient and discussed management with the resident. I reviewed the residents note and agree with the documented findings and plan of care. Any areas of disagreement are noted on the chart. I was personally present for the key portions of any procedures. I have documented in the chart those procedures where I was not present during the key portions. I have reviewed the emergency nurses triage note. I agree with the chief complaint, past medical history, past surgical history, allergies, medications, social and family history as documented unless otherwise noted below. For mid-level providers such as nurse practitioners as well as physicians assistants:    I have personally seen and evaluated the patient. I find the patient's history and physical exam are consistent with NP/PA documentation. I agree with the care provided, treatment rendered, disposition, & follow-up plan. Additional findings are as noted. Vital Signs: /82   Pulse 101   Temp 99.1 °F (37.3 °C) (Oral)   Resp 18   Wt 175 lb (79.4 kg)   SpO2 100%   BMI 32.01 kg/m²   PCP:  JAM Bush - CNP    Pertinent Comments:       Patient presents with concern for pregnancy she has had some lower pelvic pain and thinks she may be pregnant unsure of last known menstrual cycle. Denies fevers, chills or nausea vomiting is a vaginal ring or vaginal discharge. Exam she is afebrile nontoxic she has some very mild suprapubic and left lower quadrant tenderness. There is no rebound or guarding she has no pain at McBurney's point no pain in the right lower quadrant negative heeltap, negative Rovsing sign.     Critical Care  None          Carmelita Rogers MD    Attending Emergency Medicine Physician              Nuno Ding MD  03/30/22 1425

## 2022-03-31 LAB
C TRACH DNA GENITAL QL NAA+PROBE: ABNORMAL
N. GONORRHOEAE DNA: NEGATIVE
SPECIMEN DESCRIPTION: ABNORMAL

## 2022-04-01 ENCOUNTER — TELEPHONE (OUTPATIENT)
Dept: PHARMACY | Age: 22
End: 2022-04-01

## 2022-04-01 DIAGNOSIS — A74.9 CHLAMYDIA: Primary | ICD-10-CM

## 2022-04-01 RX ORDER — AZITHROMYCIN 500 MG/1
1000 TABLET, FILM COATED ORAL ONCE
Qty: 2 TABLET | Refills: 0 | Status: SHIPPED | OUTPATIENT
Start: 2022-04-01 | End: 2022-04-01

## 2022-05-04 ENCOUNTER — HOSPITAL ENCOUNTER (EMERGENCY)
Age: 22
Discharge: HOME | End: 2022-05-04
Attending: EMERGENCY MEDICINE
Payer: COMMERCIAL

## 2022-05-04 VITALS
OXYGEN SATURATION: 100 % | DIASTOLIC BLOOD PRESSURE: 76 MMHG | RESPIRATION RATE: 18 BRPM | SYSTOLIC BLOOD PRESSURE: 152 MMHG | TEMPERATURE: 98.3 F | HEART RATE: 102 BPM

## 2022-05-04 DIAGNOSIS — Z3A.01 LESS THAN 8 WEEKS GESTATION OF PREGNANCY: Primary | ICD-10-CM

## 2022-05-04 PROCEDURE — 99283 EMERGENCY DEPT VISIT LOW MDM: CPT

## 2022-05-04 RX ORDER — PNV NO.95/FERROUS FUM/FOLIC AC 28MG-0.8MG
1 TABLET ORAL DAILY
Qty: 30 TABLET | Refills: 3 | Status: SHIPPED | OUTPATIENT
Start: 2022-05-04

## 2022-05-04 ASSESSMENT — ENCOUNTER SYMPTOMS
NAUSEA: 0
VOMITING: 0
SHORTNESS OF BREATH: 0
ABDOMINAL PAIN: 0
ABDOMINAL DISTENTION: 0

## 2022-05-04 ASSESSMENT — PAIN - FUNCTIONAL ASSESSMENT: PAIN_FUNCTIONAL_ASSESSMENT: NONE - DENIES PAIN

## 2022-05-04 NOTE — ED PROVIDER NOTES
[facility]     Emergency Department     Faculty Attestation    I performed a history and physical examination of the patient and discussed management with the resident. I have reviewed and agree with the residents findings including all diagnostic interpretations, and treatment plans as written. Any areas of disagreement are noted on the chart. I was personally present for the key portions of any procedures. I have documented in the chart those procedures where I was not present during the key portions. I have reviewed the emergency nurses triage note. I agree with the chief complaint, past medical history, past surgical history, allergies, medications, social and family history as documented unless otherwise noted below. Documentation of the HPI, Physical Exam and Medical Decision Making performed by scribrenetta is based on my personal performance of the HPI, PE and MDM. For Physician Assistant/ Nurse Practitioner cases/documentation I have personally evaluated this patient and have completed at least one if not all key elements of the E/M (history, physical exam, and MDM). Additional findings are as noted. Patient her for pregnancy test, took 2 at home tests which were positive, no vaginal bleeding, no abdominal pain, accompanied by significant other who also wanted to have test done. Patient is  with 2 prior miscarriage. LMP was 4/8 which was a regular meses for her. Abdomen is soft, non tender to palpation, patient well appearing,bedside POC U preg was positive, plan for discharge home, patient given info for [facility] obgyn clinic, and started on prenatals.     Ana Luisa Lopez D.O, M.P.H  Attending Emergency Medicine Physician         Ana Luisa Lopez, DO  [phone]

## 2022-05-04 NOTE — ED PROVIDER NOTES
[facility]  ED  Emergency Department Encounter  EmergencyMedicine Resident     Pt Name:Hodan Dinh  MRN: [id number]  [place] 2000  Date of evaluation: 5/4/22  PCP:  Alene Primrose, APRN - CNP    This patient was evaluated in the Emergency Department for symptoms described in the history of present illness. The patient was evaluated in the context of the global COVID-19 pandemic, which necessitated consideration that the patient might be at risk for infection with the SARS-CoV-2 virus that causes COVID-19. Institutional protocols and algorithms that pertain to the evaluation of patients at risk for COVID-19 are in a state of rapid change based on information released by regulatory bodies including the CDC and federal and state organizations. These policies and algorithms were followed during the patient's care in the ED. CHIEF COMPLAINT       Chief Complaint   Patient presents with    Pregnancy Test     last period 4/8/2022       HISTORY OF PRESENT ILLNESS  (Location/Symptom, Timing/Onset, Context/Setting, Quality, Duration, Modifying Factors, Severity.)      Bernie Thomas is a 25 y.o. female who presents with signs of pregnancy. Patient states that her last menstrual period was 4/8. Denies any dysuria, hematuria, abnormal vaginal discharge, vaginal bleeding, vaginal itching or pelvic pain. PAST MEDICAL / SURGICAL / SOCIAL / FAMILY HISTORY      has a past medical history of BV (bacterial vaginosis), Chlamydia infection, Closed fracture of phalanx of middle finger, Gonorrhea, Seasonal allergies, Strep throat, and Trichomonas infection. has no past surgical history on file.       Social History     Socioeconomic History    Marital status: Single     Spouse name: Not on file    Number of children: Not on file    Years of education: Not on file    Highest education level: Not on file   Occupational History    Not on file   Tobacco Use    Smoking status: Passive Smoke Exposure - Never Smoker    Smokeless tobacco: Never Used   Substance and Sexual Activity    Alcohol use: Never    Drug use: Never    Sexual activity: Not on file   Other Topics Concern    Not on file   Social History Narrative    Not on file     Social Determinants of Health     Financial Resource Strain: Medium Risk    Difficulty of Paying Living Expenses: Somewhat hard   Food Insecurity: Food Insecurity Present    Worried About Running Out of Food in the Last Year: Sometimes true    Lit of Food in the Last Year: Sometimes true   Transportation Needs:     Lack of Transportation (Medical): Not on file    Lack of Transportation (Non-Medical): Not on file   Physical Activity:     Days of Exercise per Week: Not on file    Minutes of Exercise per Session: Not on file   Stress:     Feeling of Stress : Not on file   Social Connections:     Frequency of Communication with Friends and Family: Not on file    Frequency of Social Gatherings with Friends and Family: Not on file    Attends Nondenominational Services: Not on file    Active Member of [address] [place] [address] or Organizations: Not on file    Attends Club or Organization Meetings: Not on file    Marital Status: Not on file   Intimate Partner Violence:     Fear of Current or Ex-Partner: Not on file    Emotionally Abused: Not on file    Physically Abused: Not on file    Sexually Abused: Not on file   Housing Stability:     Unable to Pay for Housing in the Last Year: Not on file    Number of [place] in the Last Year: Not on file    Unstable Housing in the Last Year: Not on file       Family History   Problem Relation Age of Onset    Pancreatic Cancer Father     Arthritis Maternal Grandmother     Heart Attack Maternal Grandmother     Stroke Maternal Grandfather     Diabetes Maternal Grandfather        Allergies:  Patient has no known allergies. Home Medications:  Prior to Admission medications    Medication Sig Start Date End Date Taking?  Authorizing Provider   Prenatal Vit-Fe Fumarate-FA (PRENATAL VITAMIN) 27-0.8 MG TABS Take 1 tablet by mouth daily 5/4/22  Yes Becca Adkins MD   ibuprofen (ADVIL;MOTRIN) 600 MG tablet Take 1 tablet by mouth every 6 hours as needed for Pain 3/30/22   Ned Sicard, MD   acetaminophen (TYLENOL) 500 MG tablet Take 2 tablets by mouth every 8 hours as needed for Pain 3/30/22 4/29/22  Ned Sicard, MD       REVIEW OF SYSTEMS    (2-9 systems for level 4, 10 or more for level 5)      Review of Systems   Respiratory: Negative for shortness of breath. Cardiovascular: Negative for chest pain. Gastrointestinal: Negative for abdominal distention, abdominal pain, nausea and vomiting. Genitourinary: Negative for dysuria, frequency, hematuria, vaginal bleeding, vaginal discharge and vaginal pain. Neurological: Negative for dizziness, light-headedness and headaches. PHYSICAL EXAM   (up to 7 for level 4, 8 or more for level 5)      INITIAL VITALS:   BP (!) 152/76   Pulse (!) 102   Temp 98.3 °F (36.8 °C)   Resp 18   SpO2 100%     Physical Exam  Constitutional:       General: She is not in acute distress. HENT:      Head: Normocephalic. Right Ear: External ear normal.      Left Ear: External ear normal.      Nose: Nose normal.   Cardiovascular:      Rate and Rhythm: Tachycardia present. Pulses: Normal pulses. Pulmonary:      Effort: Pulmonary effort is normal.   Abdominal:      General: There is no distension. Palpations: Abdomen is soft. Tenderness: There is no abdominal tenderness. There is no guarding. Musculoskeletal:         General: Normal range of motion. Skin:     Capillary Refill: Capillary refill takes less than 2 seconds. Neurological:      General: No focal deficit present. Mental Status: She is alert. DIFFERENTIAL  DIAGNOSIS     PLAN (LABS / IMAGING / EKG):  No orders of the defined types were placed in this encounter.       MEDICATIONS ORDERED:  Orders Placed This Encounter Medications    Prenatal Vit-Fe Fumarate-FA (PRENATAL VITAMIN) 27-0.8 MG TABS     Sig: Take 1 tablet by mouth daily     Dispense:  30 tablet     Refill:  3       DDX: Pregnancy    MDM: 25 y.o. female presents today with concerns of pregnancy. Point-of-care pregnancy test was positive. Patient has no concerns of STD or UTI. Patient is given a prescription for prenatal vitamins and discharged with OB and primary care follow-up. DIAGNOSTIC RESULTS / EMERGENCY DEPARTMENT COURSE / MDM   LAB RESULTS:  No results found for this visit on 05/04/22. RADIOLOGY:  No orders to display        FINAL IMPRESSION      1.  Less than 8 weeks gestation of pregnancy          DISPOSITION / PLAN     DISPOSITION Decision To Discharge 05/04/2022 06:24:16 AM      PATIENT REFERRED TO:  Alene Primrose, APRN - CNP  [address]  [address] [facility]  [phone]    Schedule an appointment as soon as possible for a visit       Theodore Mar, DO  [address] [address] [phone]            DISCHARGE MEDICATIONS:  Discharge Medication List as of 5/4/2022  6:26 AM      START taking these medications    Details   Prenatal Vit-Fe Fumarate-FA (PRENATAL VITAMIN) 27-0.8 MG TABS Take 1 tablet by mouth daily, Disp-30 tablet, R-3Print             Dara Cooley MD  Emergency Medicine Resident    (Please note that portions of thisnote were completed with a voice recognition program.  Efforts were made to edit the dictations but occasionally words are mis-transcribed.)        Dara Cooley MD  Resident  05/04/22 7585

## 2022-05-04 NOTE — ED NOTES
Discharge instruction given to PT.  With follow up care information      Lady Isabelle RN  05/04/22 2067

## 2022-05-10 ENCOUNTER — TELEPHONE (OUTPATIENT)
Dept: INTERNAL MEDICINE CLINIC | Age: 22
End: 2022-05-10

## 2022-05-10 NOTE — TELEPHONE ENCOUNTER
Mailed patient no show appointment letter #1 for the date of 05/10/2022 scheduled with Dr Rice Prader.

## 2022-05-12 ENCOUNTER — APPOINTMENT (OUTPATIENT)
Dept: ULTRASOUND IMAGING | Age: 22
End: 2022-05-12
Payer: COMMERCIAL

## 2022-05-12 ENCOUNTER — HOSPITAL ENCOUNTER (EMERGENCY)
Age: 22
Discharge: HOME | End: 2022-05-12
Attending: EMERGENCY MEDICINE
Payer: COMMERCIAL

## 2022-05-12 VITALS
HEART RATE: 78 BPM | DIASTOLIC BLOOD PRESSURE: 82 MMHG | TEMPERATURE: 97.8 F | SYSTOLIC BLOOD PRESSURE: 136 MMHG | OXYGEN SATURATION: 99 % | RESPIRATION RATE: 16 BRPM

## 2022-05-12 DIAGNOSIS — Z34.90 EARLY STAGE OF PREGNANCY: Primary | ICD-10-CM

## 2022-05-12 LAB
-: NORMAL
BILIRUBIN URINE: NEGATIVE
CANDIDA SPECIES, DNA PROBE: NEGATIVE
COLOR: YELLOW
EPITHELIAL CELLS UA: NORMAL /HPF (ref 0–5)
GARDNERELLA VAGINALIS, DNA PROBE: POSITIVE
GLUCOSE URINE: NEGATIVE
HCG QUANTITATIVE: 6990 MIU/ML
KETONES, URINE: NEGATIVE
LEUKOCYTE ESTERASE, URINE: NEGATIVE
NITRITE, URINE: NEGATIVE
PH UA: 7.5 (ref 5–8)
PROTEIN UA: NEGATIVE
RBC UA: NORMAL /HPF (ref 0–4)
SOURCE: ABNORMAL
SPECIFIC GRAVITY UA: 1.02 (ref 1–1.03)
TRICHOMONAS VAGINALIS DNA: NEGATIVE
TURBIDITY: CLEAR
URINE HGB: NEGATIVE
UROBILINOGEN, URINE: NORMAL
WBC UA: NORMAL /HPF (ref 0–5)

## 2022-05-12 PROCEDURE — 81001 URINALYSIS AUTO W/SCOPE: CPT

## 2022-05-12 PROCEDURE — 6370000000 HC RX 637 (ALT 250 FOR IP): Performed by: STUDENT IN AN ORGANIZED HEALTH CARE EDUCATION/TRAINING PROGRAM

## 2022-05-12 PROCEDURE — 99284 EMERGENCY DEPT VISIT MOD MDM: CPT

## 2022-05-12 PROCEDURE — 87510 GARDNER VAG DNA DIR PROBE: CPT

## 2022-05-12 PROCEDURE — 76817 TRANSVAGINAL US OBSTETRIC: CPT

## 2022-05-12 PROCEDURE — 84702 CHORIONIC GONADOTROPIN TEST: CPT

## 2022-05-12 PROCEDURE — 87491 CHLMYD TRACH DNA AMP PROBE: CPT

## 2022-05-12 PROCEDURE — 87086 URINE CULTURE/COLONY COUNT: CPT

## 2022-05-12 PROCEDURE — 87660 TRICHOMONAS VAGIN DIR PROBE: CPT

## 2022-05-12 PROCEDURE — 87480 CANDIDA DNA DIR PROBE: CPT

## 2022-05-12 PROCEDURE — 87591 N.GONORRHOEAE DNA AMP PROB: CPT

## 2022-05-12 RX ORDER — ACETAMINOPHEN 500 MG
1000 TABLET ORAL ONCE
Status: COMPLETED | OUTPATIENT
Start: 2022-05-12 | End: 2022-05-12

## 2022-05-12 RX ORDER — METRONIDAZOLE 7.5 MG/G
GEL VAGINAL DAILY
Status: DISCONTINUED | OUTPATIENT
Start: 2022-05-12 | End: 2022-05-12 | Stop reason: HOSPADM

## 2022-05-12 RX ORDER — METRONIDAZOLE 500 MG/1
500 TABLET ORAL ONCE
Status: DISCONTINUED | OUTPATIENT
Start: 2022-05-12 | End: 2022-05-12

## 2022-05-12 RX ORDER — METRONIDAZOLE 10 MG/G
GEL TOPICAL DAILY
Status: DISCONTINUED | OUTPATIENT
Start: 2022-05-12 | End: 2022-05-12

## 2022-05-12 RX ORDER — METRONIDAZOLE 7.5 MG/G
GEL TOPICAL DAILY
Status: DISCONTINUED | OUTPATIENT
Start: 2022-05-12 | End: 2022-05-12

## 2022-05-12 RX ADMIN — METRONIDAZOLE: 7.5 GEL VAGINAL at 11:36

## 2022-05-12 RX ADMIN — ACETAMINOPHEN 1000 MG: 500 TABLET ORAL at 09:08

## 2022-05-12 ASSESSMENT — PAIN DESCRIPTION - LOCATION
LOCATION: BACK
LOCATION: ABDOMEN

## 2022-05-12 ASSESSMENT — ENCOUNTER SYMPTOMS
ABDOMINAL PAIN: 1
DIARRHEA: 0
NAUSEA: 0
SORE THROAT: 0
COUGH: 0
SHORTNESS OF BREATH: 0
VOMITING: 0
EYE PAIN: 0
BACK PAIN: 0
SINUS PAIN: 0

## 2022-05-12 ASSESSMENT — PAIN DESCRIPTION - ORIENTATION
ORIENTATION: RIGHT;LEFT;LOWER
ORIENTATION: LOWER

## 2022-05-12 ASSESSMENT — PAIN DESCRIPTION - DESCRIPTORS
DESCRIPTORS: CRAMPING
DESCRIPTORS: ACHING;DISCOMFORT;DULL

## 2022-05-12 ASSESSMENT — PAIN SCALES - GENERAL
PAINLEVEL_OUTOF10: 6
PAINLEVEL_OUTOF10: 8

## 2022-05-12 ASSESSMENT — PAIN - FUNCTIONAL ASSESSMENT: PAIN_FUNCTIONAL_ASSESSMENT: 0-10

## 2022-05-12 ASSESSMENT — PAIN DESCRIPTION - FREQUENCY: FREQUENCY: CONTINUOUS

## 2022-05-12 NOTE — CONSULTS
Ob/Gyn Consult Note         Yash Way is a 25 y.o.  female at 4w6d by LMP 22 who presented to the ED with abdominal cramping. Patient reports she had multiple positive home pregnancy tests and presented to the ED 22 for confirmation of pregnancy which was positive. She scheduled an appointment for dating/viability US with [facility] Ob/Gyn office for 22. Today she started to have painful abdominal cramping that was consistent and unrelieved by Tylenol. In the ED her vitals were stable. UA was negative and B-HCG quant 6,990. TVUS was obtained which showed a uterus with endometrial thickness 1.7cm, with an oval intrauterine fluid collection measuring 1.1 x 0.4 x 1.2 cm, no fetal pole yet visualized and small gestational sac unable to be measured to establish gestational age. On exam patient appears comfortable. She denies continued cramping or vaginal bleeding. Discussed findings today with patient. Due to early gestational age by LMP, cannot determine whether this is yet a viable pregnancy. Low suspicion for ectopic pregnancy based on US and patient's complaint. Discussed that next steps will be obtaining a 48 hour B-HCG quant. She will also need a repeat US in 2 weeks to establish dating and viability. Based on B-HCG quant we will proceed as indicated. Patient was given return precautions including heavy vaginal bleeding, severe abdominal pain, or symptoms related to blood loss anemia. She is stable for discharge at this time. Message sent to [facility] Ob/Gyn staff to schedule patient for ED follow up and possibly move US to an earlier date.          Vitals:    22 0843   BP: 136/82   Pulse: 78   Resp: 16   Temp: 97.8 °F (36.6 °C)   TempSrc: Oral   SpO2: 99%         Recent Results (from the past 12 hour(s))   HCG, Quantitative, Pregnancy    Collection Time: 22  9:16 AM   Result Value Ref Range    hCG Quant 6,990 (H) <5 mIU/mL   Vaginitis DNA Probe    Collection Time: 22  9:47 AM Specimen: Vaginal   Result Value Ref Range    Source . VAGINAL SWAB     Trichomonas Vaginalis DNA NEGATIVE NEGATIVE    GARDNERELLA VAGINALIS, DNA PROBE POSITIVE (A) NEGATIVE    CANDIDA SPECIES, DNA PROBE NEGATIVE NEGATIVE   Urinalysis with Microscopic    Collection Time: 22 12:58 PM   Result Value Ref Range    Color, UA Yellow Yellow    Turbidity UA Clear Clear    Glucose, Ur NEGATIVE NEGATIVE    Bilirubin Urine NEGATIVE NEGATIVE    Ketones, Urine NEGATIVE NEGATIVE    Specific Gravity, UA 1.023 1.005 - 1.030    Urine Hgb NEGATIVE NEGATIVE    pH, UA 7.5 5.0 - 8.0    Protein, UA NEGATIVE NEGATIVE    Urobilinogen, Urine Normal Normal    Nitrite, Urine NEGATIVE NEGATIVE    Leukocyte Esterase, Urine NEGATIVE NEGATIVE    -          WBC, UA None 0 - 5 /HPF    RBC, UA 2 TO 5 0 - 4 /HPF    Epithelial Cells UA 0 TO 2 0 - 5 /HPF           Plan discussed with attending. Cyrus Valentino DO  Ob/Gyn Resident  Pager: [phone]  [facility]   27:21 PM     Date: 2022  Time: 10:00 PM      Patient Name: Lizabeth Cifuentes  Patient : 2000  Room/Bed:   Admission Date/Time: 2022  8:37 AM        Attending Physician Statement  I have discussed the care of Lizabeth Cifuentes, including pertinent history and exam findings with the resident. I have reviewed and edited their note in the electronic medical record. The key elements of all parts of the encounter have been performed/reviewed by me . I agree with the assessment, plan and orders as documented by the resident. The level of care submitted represents to the best of my ability the care documented in the medical record today. GC Modifier. This service has been performed in part by a resident under the direction of a teaching physician.         Attending's Name:  Mark Johnson DO

## 2022-05-12 NOTE — ED NOTES
Walk in for c/o abdominal cramping with recent positive pregnancy test at this hospital.  Pt. Denies vaginal bleeding, dysuria or changes in urinary habits. Pt. Reports nausea no vomiting.        Richmond Francisco RN  05/12/22 0997

## 2022-05-12 NOTE — ED NOTES
Pt. Resting on stretcher, eyes open, RR even and non-labored  Pt.  Updated on POC  Will continue to monitor        Leana Galloway RN  05/12/22 4759

## 2022-05-12 NOTE — ED PROVIDER NOTES
[facility]  ED  Emergency Department Encounter  EmergencyMedicineResident     This patient was seen during the COVID-19 crisis. There were limited resources and those resources we did have had to be conserved for the sickest of patients. Pt Name: Meredith Mensah  MRN: [id number]  [place] 2000  Date of evaluation: 5/12/22  PCP: Linh Beebe, [address] [place], [place] [address]       Chief Complaint   Patient presents with    Abdominal Cramping     [address] [place] [address] 4/8/22, 3 positive pregnancy tests, cramping since last night, denies vaginal bleeding, dysuria        HISTORY OF PRESENT ILLNESS  (Location/Symptom, Timing/Onset, Context/Setting, Quality, Duration, Modifying Factors, Severity.)      Meredith Mensah is a 25 y.o. female who presents evaluation of abdominal cramping. Patient's last menstrual period was on 4/8. She had home pregnancy test was positive and she reports a hospital test was also positive. She stating that she is coming here today for abdominal pain. She is concerned that she is G4, P0. She has had 2 previous continuous miscarriages and 1 ectopic pregnancy. Denies vaginal bleeding. Denies any urinary complaints. PAST MEDICAL / SURGICAL /SOCIAL / FAMILY HISTORY      has a past medical history of BV (bacterial vaginosis), Chlamydia infection, Closed fracture of phalanx of middle finger, Gonorrhea, Seasonal allergies, Strep throat, and Trichomonas infection. has no past surgical history on file.       Social History     Socioeconomic History    Marital status: Single     Spouse name: Not on file    Number of children: Not on file    Years of education: Not on file    Highest education level: Not on file   Occupational History    Not on file   Tobacco Use    Smoking status: Passive Smoke Exposure - Never Smoker    Smokeless tobacco: Never Used   Substance and Sexual Activity    Alcohol use: Never    Drug use: Never    Sexual activity: Not on file   Other Topics Concern    Not on file   Social History Narrative    Not on file     Social Determinants of Health     Financial Resource Strain: Medium Risk    Difficulty of Paying Living Expenses: Somewhat hard   Food Insecurity: Food Insecurity Present    Worried About Running Out of Food in the Last Year: Sometimes true    Lit of Food in the Last Year: Sometimes true   Transportation Needs:     Lack of Transportation (Medical): Not on file    Lack of Transportation (Non-Medical): Not on file   Physical Activity:     Days of Exercise per Week: Not on file    Minutes of Exercise per Session: Not on file   Stress:     Feeling of Stress : Not on file   Social Connections:     Frequency of Communication with Friends and Family: Not on file    Frequency of Social Gatherings with Friends and Family: Not on file    Attends Anabaptist Services: Not on file    Active Member of [address] [place] [address] [employer] or Organizations: Not on file    Attends Club or Organization Meetings: Not on file    Marital Status: Not on file   Intimate Partner Violence:     Fear of Current or Ex-Partner: Not on file    Emotionally Abused: Not on file    Physically Abused: Not on file    Sexually Abused: Not on file   Housing Stability:     Unable to Pay for Housing in the Last Year: Not on file    Number of [place] in the Last Year: Not on file    Unstable Housing in the Last Year: Not on file       Family History   Problem Relation Age of Onset    Pancreatic Cancer Father     Arthritis Maternal Grandmother     Heart Attack Maternal Grandmother     Stroke Maternal Grandfather     Diabetes Maternal Grandfather        Allergies:  Patient has no known allergies. Home Medications:  Prior to Admission medications    Medication Sig Start Date End Date Taking?  Authorizing Provider   Prenatal Vit-Fe Fumarate-FA (PRENATAL VITAMIN) 27-0.8 MG TABS Take 1 tablet by mouth daily 5/4/22   Gurinder Tariq MD   acetaminophen (TYLENOL) 500 MG tablet Take 2 tablets by mouth every 8 hours as needed for Pain 3/30/22 4/29/22  Chasity Jensen MD       REVIEW OF SYSTEMS    (2-9 systems for level 4, 10 or more forlevel 5)      Review of Systems   Constitutional: Negative for activity change, chills and fever. HENT: Negative for congestion, sinus pain and sore throat. Eyes: Negative for pain and visual disturbance. Respiratory: Negative for cough and shortness of breath. Cardiovascular: Negative for chest pain. Gastrointestinal: Positive for abdominal pain. Negative for diarrhea, nausea and vomiting. Genitourinary: Negative for difficulty urinating, dysuria and hematuria. Musculoskeletal: Negative for back pain and myalgias. Skin: Negative for rash and wound. Neurological: Negative for dizziness, light-headedness and headaches. Psychiatric/Behavioral: Negative for agitation and confusion. PHYSICAL EXAM   (up to 7 for level 4, 8 or more forlevel 5)      ED TRIAGE VITALS BP: 136/82, Temp: 97.8 °F (36.6 °C), Pulse: 78, Resp: 16, SpO2: 99 %    Vitals:    05/12/22 0843   BP: 136/82   Pulse: 78   Resp: 16   Temp: 97.8 °F (36.6 °C)   TempSrc: Oral   SpO2: 99%         Physical Exam  Vitals and nursing note reviewed. Constitutional:       Appearance: Normal appearance. She is obese. HENT:      Head: Normocephalic and atraumatic. Nose: Nose normal.      Mouth/Throat:      Mouth: Mucous membranes are moist.   Eyes:      Extraocular Movements: Extraocular movements intact. Pupils: Pupils are equal, round, and reactive to light. Cardiovascular:      Rate and Rhythm: Normal rate and regular rhythm. Pulses: Normal pulses. Heart sounds: Normal heart sounds. Pulmonary:      Effort: Pulmonary effort is normal.      Breath sounds: Normal breath sounds. Abdominal:      General: Abdomen is flat. Palpations: Abdomen is soft.    Genitourinary:     Comments: Small moderate white discharge in the vaginal vault, cervical os appears normal and is closed no friability  Musculoskeletal:         General: Normal range of motion. Cervical back: Normal range of motion. Skin:     General: Skin is warm and dry. Capillary Refill: Capillary refill takes less than 2 seconds. Neurological:      General: No focal deficit present. Mental Status: She is alert and oriented to person, place, and time. Psychiatric:         Mood and Affect: Mood normal.         Behavior: Behavior normal.           DIFFERENTIAL  DIAGNOSIS     PLAN (LABS / IMAGING / EKG):  Orders Placed This Encounter   Procedures    Culture, Urine    C.trachomatis N.gonorrhoeae DNA    Vaginitis DNA Probe    US OB TRANSVAGINAL    HCG, Quantitative, Pregnancy    Urinalysis with Microscopic    hCG, Quantitative, Pregnancy    Inpatient consult to Obstetrics / Gynecology       MEDICATIONS ORDERED:  ED Medication Orders (From admission, onward)    Start Ordered     Status Ordering Provider    05/12/22 0900 05/12/22 0849  acetaminophen (TYLENOL) tablet 1,000 mg  ONCE         Last MAR action: Given - by Karen Flores on 05/12/22 at [address] [place], [place] [address]              DIAGNOSTIC RESULTS / [facility]     IMPRESSION & INITIAL PLAN:  25 female present emerged from today for evaluation abdominal cramping. Last menstrual period was on 4/8. She reports that she has had multiple home pregnancy test that have been positive and an official hospital test per patient. Here her beta quant was 6990. Bedside ultrasound did show a gestational sac. She has significant past medical history for ectopic pregnancy and 2 spontaneous abortions. Transvaginal ultrasound was ordered which showed a gestational sac without a fetal pole. OB consulted on the case. Patient to have repeat beta quant in 2 days. Vaginitis probe positive for BV. Will treat with MetroGel. Patient be discharged home.     LABS:  Results for orders placed or performed during the hospital encounter of 05/12/22   Vaginitis DNA Probe    Specimen: Vaginal   Result Value Ref Range    Source . VAGINAL SWAB     Trichomonas Vaginalis DNA NEGATIVE NEGATIVE    GARDNERELLA VAGINALIS, DNA PROBE POSITIVE (A) NEGATIVE    CANDIDA SPECIES, DNA PROBE NEGATIVE NEGATIVE   HCG, Quantitative, Pregnancy   Result Value Ref Range    hCG Quant 6,990 (H) <5 mIU/mL   Urinalysis with Microscopic   Result Value Ref Range    Color, UA Yellow Yellow    Turbidity UA Clear Clear    Glucose, Ur NEGATIVE NEGATIVE    Bilirubin Urine NEGATIVE NEGATIVE    Ketones, Urine NEGATIVE NEGATIVE    Specific Gravity, UA 1.023 1.005 - 1.030    Urine Hgb NEGATIVE NEGATIVE    pH, UA 7.5 5.0 - 8.0    Protein, UA NEGATIVE NEGATIVE    Urobilinogen, Urine Normal Normal    Nitrite, Urine NEGATIVE NEGATIVE    Leukocyte Esterase, Urine NEGATIVE NEGATIVE    -          WBC, UA None 0 - 5 /HPF    RBC, UA 2 TO 5 0 - 4 /HPF    Epithelial Cells UA 0 TO 2 0 - 5 /HPF       RADIOLOGY:  US OB TRANSVAGINAL   Final Result   Single intrauterine fluid collection, likely a gestational sac, which is too   small to estimate gestational age. Viability of the pregnancy is   indeterminate, as fetal pole is not yet visualized. Short-term follow-up of   beta hCG trend and possible follow-up pelvic ultrasound are recommended. CONSULTS:  IP CONSULT TO OB GYN    CRITICAL CARE:  See attending physician note    FINAL IMPRESSION      1.  Early stage of pregnancy          DISPOSITION / PLAN     DISPOSITION Decision To Discharge 05/12/2022 12:37:48 PM      PATIENT REFERRED TO:  Jaspal Pandey [id number]  [phone]  In 2 days        DISCHARGE MEDICATIONS:  Discharge Medication List as of 5/12/2022 12:38 PM        Discharge Medication List as of 5/12/2022 12:38 PM           Sharifa Maguire MD  Emergency Medicine Resident    (Please note that portions of this note were completed with a voice recognition program.  Efforts were made to edit the dictations but

## 2022-05-12 NOTE — ED PROVIDER NOTES
[address]     Emergency Department     Faculty Attestation    I performed a history and physical examination of the patient and discussed management with the resident. I reviewed the residents note and agree with the documented findings and plan of care. Any areas of disagreement are noted on the chart. I was personally present for the key portions of any procedures. I have documented in the chart those procedures where I was not present during the key portions. I have reviewed the emergency nurses triage note. I agree with the chief complaint, past medical history, past surgical history, allergies, medications, social and family history as documented unless otherwise noted below. For Physician Assistant/ Nurse Practitioner cases/documentation I have personally evaluated this patient and have completed at least one if not all key elements of the E/M (history, physical exam, and MDM). Additional findings are as noted. I have personally seen and evaluated the patient. I find the patient's history and physical exam are consistent with the NP/PA documentation. I agree with the care provided, treatment rendered, disposition and follow-up plan. This patient was evaluated in the Emergency Department for symptoms described in the history of present illness. The patient was evaluated in the context of the global COVID-19 pandemic, which necessitated consideration that the patient might be at risk for infection with the SARS-CoV-2 virus that causes COVID-19. Institutional protocols and algorithms that pertain to the evaluation of patients at risk for COVID-19 are in a state of rapid change based on information released by regulatory bodies including the CDC and federal and state organizations. These policies and algorithms were followed during the patient's care in the ED.     79-year-old female with recent positive home pregnancy test and positive urine pregnancy test on 5/4 in this emergency department presenting with abdominal pain and cramping. Patient is a G4, P0, with 1 prior ectopic pregnancy. Denies any vaginal bleeding. Exam:  General: Laying on the bed, awake, alert and in no acute distress  CV: normal rate and regular rhythm  Lungs: Breathing comfortably on room air with no tachypnea, hypoxia, or increased work of breathing    Plan:  Bedside ultrasound done by resident shows gestational sac in the uterus, but unable to natively identify a fetal pole. Will obtain a transvaginal ultrasound to rule out ectopic pregnancy/confirm IUP.   Quantitative hCG, UA, vaginal swabs          Xin Tellez MD   Attending Emergency  Physician    (Please note that portions of this note were completed with a voice recognition program. Efforts were made to edit the dictations but occasionally words are mis-transcribed.)             Xin Tellez MD  05/12/22 3533

## 2022-05-13 ENCOUNTER — TELEPHONE (OUTPATIENT)
Dept: OBGYN | Age: 22
End: 2022-05-13

## 2022-05-13 LAB
C TRACH DNA GENITAL QL NAA+PROBE: NEGATIVE
CULTURE: NORMAL
N. GONORRHOEAE DNA: NEGATIVE
SPECIMEN DESCRIPTION: NORMAL
SPECIMEN DESCRIPTION: NORMAL

## 2022-05-13 NOTE — TELEPHONE ENCOUNTER
----- Message from Georgina Rey DO sent at 5/12/2022  7:38 PM EDT -----  Regarding: Needs ED follow up  Hi! This patient was seen in the ED for abdominal pain and pregnant. She had a viability US scheduled for June. Can we please possibly move her US earlier (possibly the 26th ish) and schedule her for an ED follow up in the next couple weeks?     Thanks,  Shanda Hunter

## 2022-05-13 NOTE — TELEPHONE ENCOUNTER
Attempted to contact patient at [phone], I was unable to leave a message because her phone has calling restrictions.

## 2022-05-21 ENCOUNTER — HOSPITAL ENCOUNTER (EMERGENCY)
Age: 22
Discharge: HOME | End: 2022-05-21
Attending: EMERGENCY MEDICINE
Payer: COMMERCIAL

## 2022-05-21 VITALS
WEIGHT: 170 LBS | HEIGHT: 62 IN | BODY MASS INDEX: 31.28 KG/M2 | DIASTOLIC BLOOD PRESSURE: 79 MMHG | RESPIRATION RATE: 13 BRPM | TEMPERATURE: 99 F | SYSTOLIC BLOOD PRESSURE: 121 MMHG | OXYGEN SATURATION: 99 % | HEART RATE: 71 BPM

## 2022-05-21 DIAGNOSIS — R51.9 INTRACTABLE HEADACHE, UNSPECIFIED CHRONICITY PATTERN, UNSPECIFIED HEADACHE TYPE: ICD-10-CM

## 2022-05-21 DIAGNOSIS — R07.9 CHEST PAIN, UNSPECIFIED TYPE: Primary | ICD-10-CM

## 2022-05-21 LAB
-: ABNORMAL
ANION GAP SERPL CALCULATED.3IONS-SCNC: 12 MMOL/L (ref 9–17)
BACTERIA: ABNORMAL
BILIRUBIN URINE: NEGATIVE
BUN BLDV-MCNC: 12 MG/DL (ref 6–20)
CALCIUM SERPL-MCNC: 9.9 MG/DL (ref 8.6–10.4)
CHLORIDE BLD-SCNC: 101 MMOL/L (ref 98–107)
CO2: 21 MMOL/L (ref 20–31)
COLOR: YELLOW
CREAT SERPL-MCNC: 0.51 MG/DL (ref 0.5–0.9)
EPITHELIAL CELLS UA: ABNORMAL /HPF (ref 0–5)
FLU A ANTIGEN: NEGATIVE
FLU B ANTIGEN: NEGATIVE
GFR NON-AFRICAN AMERICAN: ABNORMAL ML/MIN
GFR SERPL CREATININE-BSD FRML MDRD: ABNORMAL ML/MIN/{1.73_M2}
GLUCOSE BLD-MCNC: 89 MG/DL (ref 70–99)
GLUCOSE URINE: NEGATIVE
HCG QUANTITATIVE: ABNORMAL MIU/ML
HCT VFR BLD CALC: 36.9 % (ref 36.3–47.1)
HEMOGLOBIN: 12.1 G/DL (ref 11.9–15.1)
KETONES, URINE: ABNORMAL
LEUKOCYTE ESTERASE, URINE: NEGATIVE
MCH RBC QN AUTO: 23.9 PG (ref 25.2–33.5)
MCHC RBC AUTO-ENTMCNC: 32.8 G/DL (ref 28.4–34.8)
MCV RBC AUTO: 72.8 FL (ref 82.6–102.9)
MUCUS: ABNORMAL
NITRITE, URINE: NEGATIVE
NRBC AUTOMATED: 0 PER 100 WBC
PDW BLD-RTO: 20.6 % (ref 11.8–14.4)
PH UA: 6 (ref 5–8)
PLATELET # BLD: 313 K/UL (ref 138–453)
PMV BLD AUTO: 10.3 FL (ref 8.1–13.5)
POTASSIUM SERPL-SCNC: 3.9 MMOL/L (ref 3.7–5.3)
PROTEIN UA: ABNORMAL
RBC # BLD: 5.07 M/UL (ref 3.95–5.11)
RBC UA: ABNORMAL /HPF (ref 0–2)
SARS-COV-2, RAPID: NOT DETECTED
SODIUM BLD-SCNC: 134 MMOL/L (ref 135–144)
SPECIFIC GRAVITY UA: 1.03 (ref 1–1.03)
SPECIMEN DESCRIPTION: NORMAL
TROPONIN, HIGH SENSITIVITY: <6 NG/L (ref 0–14)
TROPONIN, HIGH SENSITIVITY: <6 NG/L (ref 0–14)
TURBIDITY: CLEAR
URINE HGB: NEGATIVE
UROBILINOGEN, URINE: NORMAL
WBC # BLD: 12.6 K/UL (ref 4.5–13.5)
WBC UA: ABNORMAL /HPF (ref 0–5)

## 2022-05-21 PROCEDURE — 2580000003 HC RX 258: Performed by: STUDENT IN AN ORGANIZED HEALTH CARE EDUCATION/TRAINING PROGRAM

## 2022-05-21 PROCEDURE — 87804 INFLUENZA ASSAY W/OPTIC: CPT

## 2022-05-21 PROCEDURE — 80048 BASIC METABOLIC PNL TOTAL CA: CPT

## 2022-05-21 PROCEDURE — 96374 THER/PROPH/DIAG INJ IV PUSH: CPT

## 2022-05-21 PROCEDURE — 81001 URINALYSIS AUTO W/SCOPE: CPT

## 2022-05-21 PROCEDURE — 96361 HYDRATE IV INFUSION ADD-ON: CPT

## 2022-05-21 PROCEDURE — 6370000000 HC RX 637 (ALT 250 FOR IP): Performed by: STUDENT IN AN ORGANIZED HEALTH CARE EDUCATION/TRAINING PROGRAM

## 2022-05-21 PROCEDURE — 99284 EMERGENCY DEPT VISIT MOD MDM: CPT

## 2022-05-21 PROCEDURE — 87635 SARS-COV-2 COVID-19 AMP PRB: CPT

## 2022-05-21 PROCEDURE — 84702 CHORIONIC GONADOTROPIN TEST: CPT

## 2022-05-21 PROCEDURE — 6360000002 HC RX W HCPCS: Performed by: STUDENT IN AN ORGANIZED HEALTH CARE EDUCATION/TRAINING PROGRAM

## 2022-05-21 PROCEDURE — 84484 ASSAY OF TROPONIN QUANT: CPT

## 2022-05-21 PROCEDURE — 85027 COMPLETE CBC AUTOMATED: CPT

## 2022-05-21 PROCEDURE — 93005 ELECTROCARDIOGRAM TRACING: CPT | Performed by: STUDENT IN AN ORGANIZED HEALTH CARE EDUCATION/TRAINING PROGRAM

## 2022-05-21 RX ORDER — CALCIUM GLUCONATE 94 MG/ML
1000 INJECTION, SOLUTION INTRAVENOUS PRN
Status: DISCONTINUED | OUTPATIENT
Start: 2022-05-21 | End: 2022-05-21

## 2022-05-21 RX ORDER — NALBUPHINE HCL 10 MG/ML
10 AMPUL (ML) INJECTION ONCE
Status: DISCONTINUED | OUTPATIENT
Start: 2022-05-21 | End: 2022-05-21 | Stop reason: HOSPADM

## 2022-05-21 RX ORDER — ACETAMINOPHEN 500 MG
1000 TABLET ORAL ONCE
Status: COMPLETED | OUTPATIENT
Start: 2022-05-21 | End: 2022-05-21

## 2022-05-21 RX ORDER — METOCLOPRAMIDE HYDROCHLORIDE 5 MG/ML
10 INJECTION INTRAMUSCULAR; INTRAVENOUS ONCE
Status: COMPLETED | OUTPATIENT
Start: 2022-05-21 | End: 2022-05-21

## 2022-05-21 RX ORDER — MAGNESIUM SULFATE HEPTAHYDRATE 40 MG/ML
4000 INJECTION, SOLUTION INTRAVENOUS ONCE
Status: DISCONTINUED | OUTPATIENT
Start: 2022-05-21 | End: 2022-05-21

## 2022-05-21 RX ORDER — 0.9 % SODIUM CHLORIDE 0.9 %
1000 INTRAVENOUS SOLUTION INTRAVENOUS ONCE
Status: COMPLETED | OUTPATIENT
Start: 2022-05-21 | End: 2022-05-21

## 2022-05-21 RX ADMIN — METOCLOPRAMIDE 10 MG: 5 INJECTION, SOLUTION INTRAMUSCULAR; INTRAVENOUS at 20:09

## 2022-05-21 RX ADMIN — SODIUM CHLORIDE 1000 ML: 9 INJECTION, SOLUTION INTRAVENOUS at 20:09

## 2022-05-21 RX ADMIN — ACETAMINOPHEN 1000 MG: 500 TABLET ORAL at 19:16

## 2022-05-21 ASSESSMENT — PAIN DESCRIPTION - ORIENTATION
ORIENTATION: MID
ORIENTATION: MID

## 2022-05-21 ASSESSMENT — PAIN DESCRIPTION - FREQUENCY
FREQUENCY: INTERMITTENT
FREQUENCY: INTERMITTENT

## 2022-05-21 ASSESSMENT — ENCOUNTER SYMPTOMS
COUGH: 0
ABDOMINAL PAIN: 0
SHORTNESS OF BREATH: 0
VOMITING: 0
BACK PAIN: 0
SORE THROAT: 0

## 2022-05-21 ASSESSMENT — PAIN SCALES - GENERAL
PAINLEVEL_OUTOF10: 9
PAINLEVEL_OUTOF10: 10
PAINLEVEL_OUTOF10: 10

## 2022-05-21 ASSESSMENT — PAIN DESCRIPTION - LOCATION
LOCATION: HEAD
LOCATION: CHEST
LOCATION_2: HEAD
LOCATION: HEAD

## 2022-05-21 ASSESSMENT — PAIN DESCRIPTION - DESCRIPTORS
DESCRIPTORS_2: POUNDING;PRESSURE
DESCRIPTORS: THROBBING;SQUEEZING
DESCRIPTORS: THROBBING;SHARP;SHOOTING
DESCRIPTORS: OTHER (COMMENT);PRESSURE

## 2022-05-21 ASSESSMENT — PAIN DESCRIPTION - PAIN TYPE: TYPE: ACUTE PAIN

## 2022-05-21 ASSESSMENT — PAIN - FUNCTIONAL ASSESSMENT
PAIN_FUNCTIONAL_ASSESSMENT: 0-10
PAIN_FUNCTIONAL_ASSESSMENT_SITE2: PREVENTS OR INTERFERES SOME ACTIVE ACTIVITIES AND ADLS
PAIN_FUNCTIONAL_ASSESSMENT: PREVENTS OR INTERFERES SOME ACTIVE ACTIVITIES AND ADLS
PAIN_FUNCTIONAL_ASSESSMENT: ACTIVITIES ARE NOT PREVENTED

## 2022-05-21 ASSESSMENT — PAIN DESCRIPTION - ONSET: ONSET: AWAKENED FROM SLEEP

## 2022-05-21 ASSESSMENT — LIFESTYLE VARIABLES: HOW OFTEN DO YOU HAVE A DRINK CONTAINING ALCOHOL: NEVER

## 2022-05-21 ASSESSMENT — PAIN DESCRIPTION - INTENSITY: RATING_2: 9

## 2022-05-21 NOTE — ED NOTES
NO IV access at this time, the IV infiltrated will attempt once patient calms down      Kristen Kumari RN  05/21/22 1914

## 2022-05-21 NOTE — ED PROVIDER NOTES
[facility] ED  Emergency Department Encounter  EmergencyMedicine Resident     Pt Name:Hodan Greene  MRN: [id number]  [place] 2000  Date of evaluation: 5/21/22  PCP:  JAM Saldivar CNP    This patient was evaluated in the Emergency Department for symptoms described in the history of present illness. The patient was evaluated in the context of the global COVID-19 pandemic, which necessitated consideration that the patient might be at risk for infection with the SARS-CoV-2 virus that causes COVID-19. Institutional protocols and algorithms that pertain to the evaluation of patients at risk for COVID-19 are in a state of rapid change based on information released by regulatory bodies including the CDC and federal and state organizations. These policies and algorithms were followed during the patient's care in the ED. CHIEF COMPLAINT       Chief Complaint   Patient presents with    Migraine     no history of     Chest Pain       HISTORY OF PRESENT ILLNESS  (Location/Symptom, Timing/Onset, Context/Setting, Quality, Duration, Modifying Factors, Severity.)      Bharath Tran is a 25 y.o. female who presents with 2-day history of chest pain and headaches, patient is approximately 5 weeks pregnant and has had a pelvic ultrasound showing an intrauterine pregnancy. Patient states that she has not had a visit with her OB/GYN at this time. Patient does note to have 2 blood pressure readings that are 140/75 and 140/80, states that she had high blood pressure in her previous pregnancy as well. Patient denying any shortness of breath, abdominal pain, pelvic pain, vaginal discharge or bleeding. Denies dysuria or hematuria. Does endorse some nausea, however states that that is typical of her pregnancy this early on.     PAST MEDICAL / SURGICAL / SOCIAL / FAMILY HISTORY      has a past medical history of BV (bacterial vaginosis), Chlamydia infection, Closed fracture of phalanx of middle finger, Gonorrhea, Seasonal allergies, Strep throat, and Trichomonas infection. has no past surgical history on file. Social History     Socioeconomic History    Marital status: Single     Spouse name: Not on file    Number of children: Not on file    Years of education: Not on file    Highest education level: Not on file   Occupational History    Not on file   Tobacco Use    Smoking status: Passive Smoke Exposure - Never Smoker    Smokeless tobacco: Never Used   Substance and Sexual Activity    Alcohol use: Never    Drug use: Never    Sexual activity: Not on file   Other Topics Concern    Not on file   Social History Narrative    Not on file     Social Determinants of Health     Financial Resource Strain: Medium Risk    Difficulty of Paying Living Expenses: Somewhat hard   Food Insecurity: Food Insecurity Present    Worried About Running Out of Food in the Last Year: Sometimes true    Lit of Food in the Last Year: Sometimes true   Transportation Needs:     Lack of Transportation (Medical): Not on file    Lack of Transportation (Non-Medical):  Not on file   Physical Activity:     Days of Exercise per Week: Not on file    Minutes of Exercise per Session: Not on file   Stress:     Feeling of Stress : Not on file   Social Connections:     Frequency of Communication with Friends and Family: Not on file    Frequency of Social Gatherings with Friends and Family: Not on file    Attends Religion Services: Not on file    Active Member of Clubs or Organizations: Not on file    Attends Club or Organization Meetings: Not on file    Marital Status: Not on file   Intimate Partner Violence:     Fear of Current or Ex-Partner: Not on file    Emotionally Abused: Not on file    Physically Abused: Not on file    Sexually Abused: Not on file   Housing Stability:     Unable to Pay for Housing in the Last Year: Not on file    Number of Places Lived in the Last Year: Not on file    Unstable Housing in the Last Year: Not on file       Family History   Problem Relation Age of Onset    Pancreatic Cancer Father     Arthritis Maternal Grandmother     Heart Attack Maternal Grandmother     Stroke Maternal Grandfather     Diabetes Maternal Grandfather        Allergies:  Patient has no known allergies. Home Medications:  Prior to Admission medications    Medication Sig Start Date End Date Taking? Authorizing Provider   Prenatal Vit-Fe Fumarate-FA (PRENATAL VITAMIN) 27-0.8 MG TABS Take 1 tablet by mouth daily 5/4/22   Philomena Matute MD   acetaminophen (TYLENOL) 500 MG tablet Take 2 tablets by mouth every 8 hours as needed for Pain 3/30/22 4/29/22  Chasity Jensen MD       REVIEW OF SYSTEMS    (2-9 systems for level 4, 10 or more for level 5)      Review of Systems   Constitutional: Negative for chills and fever. HENT: Negative for sore throat. Eyes: Negative for visual disturbance. Respiratory: Negative for cough and shortness of breath. Cardiovascular: Positive for chest pain. Gastrointestinal: Negative for abdominal pain and vomiting. Endocrine: Negative for polyuria. Genitourinary: Negative for dysuria and hematuria. Musculoskeletal: Negative for back pain. Neurological: Positive for headaches. Negative for light-headedness. Psychiatric/Behavioral: Negative for confusion. PHYSICAL EXAM   (up to 7 for level 4, 8 or more for level 5)      INITIAL VITALS:   /79   Pulse 71   Temp 99 °F (37.2 °C) (Oral)   Resp 13   Ht 5' 2\" (1.575 m)   Wt 170 lb (77.1 kg)   LMP 04/08/2022 (Exact Date)   SpO2 99%   BMI 31.09 kg/m²     Physical Exam  Constitutional:       Appearance: Normal appearance. HENT:      Head: Normocephalic. Nose: Nose normal.      Mouth/Throat:      Mouth: Mucous membranes are moist.      Pharynx: Oropharynx is clear. Eyes:      Extraocular Movements: Extraocular movements intact. Pupils: Pupils are equal, round, and reactive to light. Cardiovascular:      Rate and Rhythm: Normal rate and regular rhythm. Pulses: Normal pulses. Heart sounds: Normal heart sounds. Pulmonary:      Effort: Pulmonary effort is normal.      Breath sounds: Normal breath sounds. Abdominal:      Palpations: Abdomen is soft. Tenderness: There is no abdominal tenderness. There is no right CVA tenderness or left CVA tenderness. Musculoskeletal:      Cervical back: Neck supple. No tenderness. Right lower leg: No edema. Left lower leg: No edema. Skin:     General: Skin is warm. Capillary Refill: Capillary refill takes less than 2 seconds. Neurological:      General: No focal deficit present. Mental Status: She is alert and oriented to person, place, and time. Mental status is at baseline.    Psychiatric:         Mood and Affect: Mood normal.       DIFFERENTIAL  DIAGNOSIS     PLAN (LABS / IMAGING / EKG):  Orders Placed This Encounter   Procedures    COVID-19, Rapid    RAPID INFLUENZA A/B ANTIGENS    CBC    HCG, QUANTITATIVE, PREGNANCY    Troponin    Basic Metabolic Panel    Urinalysis with Microscopic    Vital signs per unit routine    Bedrest with bathroom privileges    EKG 12 Lead       MEDICATIONS ORDERED:  Orders Placed This Encounter   Medications    DISCONTD: magnesium sulfate 4000 mg in 100 mL IVPB premix    DISCONTD: magnesium sulfate (61011 mg/500mL infusion)    DISCONTD: calcium gluconate 10 % injection 1,000 mg    0.9 % sodium chloride bolus    metoclopramide (REGLAN) injection 10 mg    acetaminophen (TYLENOL) tablet 1,000 mg    nalbuphine (NUBAIN) injection 10 mg       DDX: Essential hypertension, hypertensive pregnancy, preeclampsia, eclampsia, urinary tract infection, migraine headache, COVID, influenza    DIAGNOSTIC RESULTS / EMERGENCY DEPARTMENT COURSE / MDM   LAB RESULTS:  Results for orders placed or performed during the hospital encounter of 05/21/22   COVID-19, Rapid    Specimen: Nasopharyngeal Swab   Result Value Ref Range    Specimen Description . NASOPHARYNGEAL SWAB     SARS-CoV-2, Rapid Not Detected Not Detected   RAPID INFLUENZA A/B ANTIGENS    Specimen: Nasopharyngeal   Result Value Ref Range    Flu A Antigen NEGATIVE NEGATIVE    Flu B Antigen NEGATIVE NEGATIVE   CBC   Result Value Ref Range    WBC 12.6 4.5 - 13.5 k/uL    RBC 5.07 3.95 - 5.11 m/uL    Hemoglobin 12.1 11.9 - 15.1 g/dL    Hematocrit 36.9 36.3 - 47.1 %    MCV 72.8 (L) 82.6 - 102.9 fL    MCH 23.9 (L) 25.2 - 33.5 pg    MCHC 32.8 28.4 - 34.8 g/dL    RDW 20.6 (H) 11.8 - 14.4 %    Platelets 201 993 - 971 k/uL    MPV 10.3 8.1 - 13.5 fL    NRBC Automated 0.0 0.0 per 100 WBC   HCG, QUANTITATIVE, PREGNANCY   Result Value Ref Range    hCG Quant 51,049 (H) <5 mIU/mL   Troponin   Result Value Ref Range    Troponin, High Sensitivity <6 0 - 14 ng/L   Troponin   Result Value Ref Range    Troponin, High Sensitivity <6 0 - 14 ng/L   Basic Metabolic Panel   Result Value Ref Range    Glucose 89 70 - 99 mg/dL    BUN 12 6 - 20 mg/dL    CREATININE 0.51 0.50 - 0.90 mg/dL    Calcium 9.9 8.6 - 10.4 mg/dL    Sodium 134 (L) 135 - 144 mmol/L    Potassium 3.9 3.7 - 5.3 mmol/L    Chloride 101 98 - 107 mmol/L    CO2 21 20 - 31 mmol/L    Anion Gap 12 9 - 17 mmol/L    GFR Non-African American  >60 mL/min     Pediatric GFR requires additional information. Refer to [facility] website for calculator.     GFR Comment         Urinalysis with Microscopic   Result Value Ref Range    Color, UA Yellow Yellow    Turbidity UA Clear Clear    Glucose, Ur NEGATIVE NEGATIVE    Bilirubin Urine NEGATIVE NEGATIVE    Ketones, Urine LARGE (A) NEGATIVE    Specific Gravity, UA 1.034 (H) 1.005 - 1.030    Urine Hgb NEGATIVE NEGATIVE    pH, UA 6.0 5.0 - 8.0    Protein, UA TRACE (A) NEGATIVE    Urobilinogen, Urine Normal Normal    Nitrite, Urine NEGATIVE NEGATIVE    Leukocyte Esterase, Urine NEGATIVE NEGATIVE    -          WBC, UA 2 TO 5 0 - 5 /HPF    RBC, UA None 0 - 2 /HPF    Epithelial Cells UA 5 TO 10 0 - 5 /HPF    Bacteria, UA FEW (A) None    Mucus, UA 1+ (A) None       IMPRESSION: 81-year-old lady presents to the emergency department with 2-day history of chest pain and headaches, currently approximately 5 weeks pregnant. Patient does have a confirmed intrauterine pregnancy with a pelvic ultrasound. Vital signs stable, slightly hypertensive with 2 blood pressure readings at 140/80 and 140/75. Physical exam otherwise grossly unremarkable. Fluids, Reglan and Tylenol given. Labs grossly unremarkable. Repeat blood pressure was 120/80. Patient states that her headache has resolved at this time. Discussed with patient with regards to follow-up with primary care physician, OB/GYN and for strict return precautions. Patient verbalized agreement understanding. Stable for discharge    EMERGENCY DEPARTMENT COURSE:  ED Course as of 05/21/22 2112   Sat May 21, 2022   2040 Hypertension likely due to discomfort [EM]   2057 On reevaluation, patient states that she feels much better and the headache has gone. Patient has not needed the new pain. Discussed with patient with regards to follow-up with primary care physician and for return precautions. Stable for discharge. [EM]   2057 hCG elevating appropriately [EM]   2102 Leukocyte Esterase, Urine: NEGATIVE [EM]   2103 Nitrite, Urine: NEGATIVE [EM]   2103 WBC, UA: 2 TO 5 [EM]   2103 Epithelial Cells, UA: 5 TO 10 [EM]   2103 Bacteria, UA(!): FEW [EM]   2111 Urine contaminated [EM]      ED Course User Index  [EM] Uzair Dominguez MD      No notes of EC Admission Criteria type on file. PROCEDURES:  None    CONSULTS:  None      FINAL IMPRESSION      1. Chest pain, unspecified type    2.  Intractable headache, unspecified chronicity pattern, unspecified headache type          DISPOSITION / PLAN     DISPOSITION        PATIENT REFERRED TO:  Tavon Loaiza, JAM - CNP  [address] [facility]  [phone]    Schedule an appointment as soon as possible for a visit   For follow up    [facility] ED  [facility]  [phone]  Go to   As needed    [address] [id number] [phone]  Schedule an appointment as soon as possible for a visit   For follow up      DISCHARGE MEDICATIONS:  New Prescriptions    No medications on file       Jian Nowak MD  Emergency Medicine Resident    (Please note that portions of thisnote were completed with a voice recognition program.  Efforts were made to edit the dictations but occasionally words are mis-transcribed.)        Jian Nowak MD  Resident  05/21/22 0337       Jian Nowak MD  Resident  05/21/22 7480

## 2022-05-21 NOTE — ED PROVIDER NOTES
[facility] [address] ED     Emergency Department     Faculty Attestation        I performed a history and physical examination of the patient and discussed management with the resident. I reviewed the residents note and agree with the documented findings and plan of care. Any areas of disagreement are noted on the chart. I was personally present for the key portions of any procedures. I have documented in the chart those procedures where I was not present during the key portions. I have reviewed the emergency nurses triage note. I agree with the chief complaint, past medical history, past surgical history, allergies, medications, social and family history as documented unless otherwise noted below. For mid-level providers such as nurse practitioners as well as physicians assistants:    I have personally seen and evaluated the patient. I find the patient's history and physical exam are consistent with NP/PA documentation. I agree with the care provided, treatment rendered, disposition, & follow-up plan. Additional findings are as noted. Vital Signs: BP (!) 140/73   Pulse 88   Temp 99 °F (37.2 °C) (Oral)   Resp 24   Ht 5' 2\" (1.575 m)   Wt 170 lb (77.1 kg)   LMP 04/08/2022 (Exact Date)   SpO2 100%   BMI 31.09 kg/m²   PCP:  Lalit Echevarria, APRN - CNP    Pertinent Comments:     Patient is around 6 weeks pregnant and has a known IUP and was fine yesterday but woke up this morning with chest pain and headache and body aches. No sick contacts or recent travel. Unvaccinated from [facility]. Exam she is afebrile nontoxic resting comfortably no acute distress nonfocal neurological exam.  Neck soft and supple with no meningeal signs.   No abdominal pain and abdomen soft and nontender      EKG Interpretation    Interpreted by me    Rhythm: normal sinus   Rate: normal  Axis: normal  Ectopy: none  Conduction: normal  ST Segments: no acute change  T Waves: no acute change  Q Waves: none    Clinical Impression: Normal EKG          Katie Treviño MD    Attending Emergency Medicine Physician              Abhi Das MD  05/21/22 0595

## 2022-05-21 NOTE — ED TRIAGE NOTES
Pt present to Er for Chest pain and headache that started when she woke up today around 1030 am  Pt states it woke her up out of her sleep   Chest pain 10/10  Feels like \"pushing down real hard on her chest   Non radiating   Pt headache is also a 10/10  Center of her fore head  Something squeezing her head to gather    Pt is also 1 month a 5 days pregnancy  Her 4th pregnancy   3 miscarriages, pt reports she also had high blood pressure issues with those pregnancies

## 2022-05-21 NOTE — ED NOTES
The following labs labeled with pt sticker and tubed to lab:     [] Blue     [x] Lavender   [] on ice  [x] Green/yellow  [] Green/black [] on ice  [] Yellow  [] Red  [] Pink      [x] COVID-19 swab    [x] Rapid  [] PCR  [x] Flu swab  [] Peds Viral Panel     [] Urine Sample  [] Pelvic Cultures  [] Blood Cultures            Kristen Kumari RN  05/21/22 1914

## 2022-05-22 NOTE — ED NOTES
The following labs labeled with pt sticker and tubed to lab:     [] Blue     [] Fanny Conception   [] on ice  [] Green/yellow  [] Green/black [] on ice  [] Yellow  [] Red  [] Pink      [] COVID-19 swab    [] Rapid  [] PCR  [] Flu swab  [] Peds Viral Panel     [x] Urine Sample  [] Pelvic Cultures  [] Blood Cultures            Yoanna Kirkpatrick RN  05/21/22 2011

## 2022-05-23 LAB
EKG ATRIAL RATE: 64 BPM
EKG P AXIS: 6 DEGREES
EKG P-R INTERVAL: 130 MS
EKG Q-T INTERVAL: 400 MS
EKG QRS DURATION: 92 MS
EKG QTC CALCULATION (BAZETT): 412 MS
EKG R AXIS: 31 DEGREES
EKG T AXIS: 10 DEGREES
EKG VENTRICULAR RATE: 64 BPM

## 2022-05-23 PROCEDURE — 93010 ELECTROCARDIOGRAM REPORT: CPT | Performed by: INTERNAL MEDICINE

## 2022-06-01 ENCOUNTER — HOSPITAL ENCOUNTER (EMERGENCY)
Age: 22
Discharge: HOME | End: 2022-06-01
Attending: EMERGENCY MEDICINE
Payer: COMMERCIAL

## 2022-06-01 VITALS
HEART RATE: 78 BPM | SYSTOLIC BLOOD PRESSURE: 109 MMHG | DIASTOLIC BLOOD PRESSURE: 64 MMHG | RESPIRATION RATE: 16 BRPM | OXYGEN SATURATION: 99 % | TEMPERATURE: 98.2 F

## 2022-06-01 DIAGNOSIS — N30.00 ACUTE CYSTITIS WITHOUT HEMATURIA: Primary | ICD-10-CM

## 2022-06-01 DIAGNOSIS — O23.599 BACTERIAL VAGINOSIS IN PREGNANCY: ICD-10-CM

## 2022-06-01 DIAGNOSIS — B96.89 BACTERIAL VAGINOSIS IN PREGNANCY: ICD-10-CM

## 2022-06-01 DIAGNOSIS — O21.9 NAUSEA AND VOMITING DURING PREGNANCY: ICD-10-CM

## 2022-06-01 LAB
-: ABNORMAL
ANION GAP SERPL CALCULATED.3IONS-SCNC: 17 MMOL/L (ref 9–17)
BACTERIA: ABNORMAL
BILIRUBIN URINE: NEGATIVE
BUN BLDV-MCNC: 8 MG/DL (ref 6–20)
CALCIUM SERPL-MCNC: 10 MG/DL (ref 8.6–10.4)
CANDIDA SPECIES, DNA PROBE: NEGATIVE
CASTS UA: ABNORMAL /LPF (ref 0–2)
CASTS UA: ABNORMAL /LPF (ref 0–2)
CHLORIDE BLD-SCNC: 98 MMOL/L (ref 98–107)
CO2: 22 MMOL/L (ref 20–31)
COLOR: YELLOW
CREAT SERPL-MCNC: 0.55 MG/DL (ref 0.5–0.9)
EPITHELIAL CELLS UA: ABNORMAL /HPF (ref 0–5)
FLU A ANTIGEN: NEGATIVE
FLU B ANTIGEN: NEGATIVE
GARDNERELLA VAGINALIS, DNA PROBE: POSITIVE
GFR NON-AFRICAN AMERICAN: NORMAL ML/MIN
GFR SERPL CREATININE-BSD FRML MDRD: NORMAL ML/MIN/{1.73_M2}
GLUCOSE BLD-MCNC: 98 MG/DL (ref 70–99)
GLUCOSE URINE: NEGATIVE
HCG QUANTITATIVE: ABNORMAL MIU/ML
KETONES, URINE: ABNORMAL
LEUKOCYTE ESTERASE, URINE: ABNORMAL
MAGNESIUM: 1.9 MG/DL (ref 1.7–2.2)
MUCUS: ABNORMAL
NITRITE, URINE: NEGATIVE
PH UA: 6.5 (ref 5–8)
POTASSIUM SERPL-SCNC: 4.3 MMOL/L (ref 3.7–5.3)
PROTEIN UA: ABNORMAL
RBC UA: ABNORMAL /HPF (ref 0–2)
SARS-COV-2, RAPID: NOT DETECTED
SODIUM BLD-SCNC: 137 MMOL/L (ref 135–144)
SOURCE: ABNORMAL
SPECIFIC GRAVITY UA: 1.02 (ref 1–1.03)
SPECIMEN DESCRIPTION: NORMAL
TRICHOMONAS VAGINALIS DNA: NEGATIVE
TURBIDITY: ABNORMAL
URINE HGB: NEGATIVE
UROBILINOGEN, URINE: NORMAL
WBC UA: ABNORMAL /HPF (ref 0–5)

## 2022-06-01 PROCEDURE — 87491 CHLMYD TRACH DNA AMP PROBE: CPT

## 2022-06-01 PROCEDURE — 6370000000 HC RX 637 (ALT 250 FOR IP): Performed by: HEALTH CARE PROVIDER

## 2022-06-01 PROCEDURE — 96365 THER/PROPH/DIAG IV INF INIT: CPT

## 2022-06-01 PROCEDURE — 6360000002 HC RX W HCPCS

## 2022-06-01 PROCEDURE — 87086 URINE CULTURE/COLONY COUNT: CPT

## 2022-06-01 PROCEDURE — 87480 CANDIDA DNA DIR PROBE: CPT

## 2022-06-01 PROCEDURE — 99284 EMERGENCY DEPT VISIT MOD MDM: CPT

## 2022-06-01 PROCEDURE — 87804 INFLUENZA ASSAY W/OPTIC: CPT

## 2022-06-01 PROCEDURE — 87660 TRICHOMONAS VAGIN DIR PROBE: CPT

## 2022-06-01 PROCEDURE — 87510 GARDNER VAG DNA DIR PROBE: CPT

## 2022-06-01 PROCEDURE — 96361 HYDRATE IV INFUSION ADD-ON: CPT

## 2022-06-01 PROCEDURE — 84702 CHORIONIC GONADOTROPIN TEST: CPT

## 2022-06-01 PROCEDURE — 2580000003 HC RX 258: Performed by: HEALTH CARE PROVIDER

## 2022-06-01 PROCEDURE — 81001 URINALYSIS AUTO W/SCOPE: CPT

## 2022-06-01 PROCEDURE — 87591 N.GONORRHOEAE DNA AMP PROB: CPT

## 2022-06-01 PROCEDURE — 87635 SARS-COV-2 COVID-19 AMP PRB: CPT

## 2022-06-01 PROCEDURE — 83735 ASSAY OF MAGNESIUM: CPT

## 2022-06-01 PROCEDURE — 80048 BASIC METABOLIC PNL TOTAL CA: CPT

## 2022-06-01 RX ORDER — MAGNESIUM SULFATE 1 G/100ML
1000 INJECTION INTRAVENOUS ONCE
Status: COMPLETED | OUTPATIENT
Start: 2022-06-01 | End: 2022-06-01

## 2022-06-01 RX ORDER — METRONIDAZOLE 7.5 MG/G
1 GEL VAGINAL NIGHTLY
Qty: 70 G | Refills: 0 | Status: SHIPPED | OUTPATIENT
Start: 2022-06-01 | End: 2022-06-06

## 2022-06-01 RX ORDER — MAGNESIUM SULFATE 1 G/100ML
INJECTION INTRAVENOUS
Status: COMPLETED
Start: 2022-06-01 | End: 2022-06-01

## 2022-06-01 RX ORDER — AMOXICILLIN AND CLAVULANATE POTASSIUM 500; 125 MG/1; MG/1
1 TABLET, FILM COATED ORAL 2 TIMES DAILY
Qty: 14 TABLET | Refills: 0 | Status: SHIPPED | OUTPATIENT
Start: 2022-06-01 | End: 2022-06-08

## 2022-06-01 RX ORDER — LANOLIN ALCOHOL/MO/W.PET/CERES
50 CREAM (GRAM) TOPICAL ONCE
Status: COMPLETED | OUTPATIENT
Start: 2022-06-01 | End: 2022-06-01

## 2022-06-01 RX ORDER — 0.9 % SODIUM CHLORIDE 0.9 %
1000 INTRAVENOUS SOLUTION INTRAVENOUS ONCE
Status: COMPLETED | OUTPATIENT
Start: 2022-06-01 | End: 2022-06-01

## 2022-06-01 RX ORDER — LANOLIN ALCOHOL/MO/W.PET/CERES
25 CREAM (GRAM) TOPICAL 3 TIMES DAILY
Qty: 60 TABLET | Refills: 0 | Status: SHIPPED | OUTPATIENT
Start: 2022-06-01

## 2022-06-01 RX ADMIN — MAGNESIUM SULFATE HEPTAHYDRATE 1000 MG: 1 INJECTION, SOLUTION INTRAVENOUS at 21:07

## 2022-06-01 RX ADMIN — DOXYLAMINE SUCCINATE 25 MG: 25 TABLET ORAL at 20:00

## 2022-06-01 RX ADMIN — Medication 50 MG: at 20:00

## 2022-06-01 RX ADMIN — MAGNESIUM SULFATE 1000 MG: 1 INJECTION INTRAVENOUS at 21:07

## 2022-06-01 RX ADMIN — SODIUM CHLORIDE 1000 ML: 9 INJECTION, SOLUTION INTRAVENOUS at 20:01

## 2022-06-01 ASSESSMENT — PAIN SCALES - GENERAL: PAINLEVEL_OUTOF10: 9

## 2022-06-01 ASSESSMENT — PAIN DESCRIPTION - LOCATION: LOCATION: ABDOMEN

## 2022-06-01 NOTE — ED PROVIDER NOTES
[facility] ED  Emergency Department Encounter  EmergencyMedicine Resident     Pt Name:Hodan Escalera  MRN: [id number]  [place] 2000  Date of evaluation: 22  PCP:  JAM Fox CNP    This patient was evaluated in the Emergency Department for symptoms described in the history of present illness. The patient was evaluated in the context of the global COVID-19 pandemic, which necessitated consideration that the patient might be at risk for infection with the SARS-CoV-2 virus that causes COVID-19. Institutional protocols and algorithms that pertain to the evaluation of patients at risk for COVID-19 are in a state of rapid change based on information released by regulatory bodies including the CDC and federal and state organizations. These policies and algorithms were followed during the patient's care in the ED. CHIEF COMPLAINT       Chief Complaint   Patient presents with    Nausea    Emesis    Chills       HISTORY OF PRESENT ILLNESS  (Location/Symptom, Timing/Onset, Context/Setting, Quality, Duration, Modifying Factors, Severity.)      Abbey Frazier is a  25 y.o. female approx 8wk pregnant by estimated LMP who presents via POV with concern for 1 day of nausea and vomiting, unable to keep any fluids down. Nausea, vomiting, headache began this am, sometimes feeling lightheaded. Has otherwise been feeling well up until today. No abdominal or pelvic pain, no bleeding or loss of fluid, has been having thick white discharge since around the . Has had prior gonorrhea and chlamydia. She does have her first appt with OB on the . Did not have nausea/vomiting in prior pregnancies prior to miscarriages. No known sick contacts, no suspect food ingestion.       Transvaginal u/s (ED for chest pain)  Single intrauterine fluid collection, likely a gestational sac, which is too   small to estimate gestational age. Rosy Combe of the pregnancy is indeterminate, as fetal pole is not yet visualized.  Short-term follow-up of   beta hCG trend and possible follow-up pelvic ultrasound are recommended. PAST MEDICAL / SURGICAL / SOCIAL / FAMILY HISTORY      has a past medical history of BV (bacterial vaginosis), Chlamydia infection, Closed fracture of phalanx of middle finger, Gonorrhea, Seasonal allergies, Strep throat, and Trichomonas infection. has no past surgical history on file. Social History     Socioeconomic History    Marital status: Single     Spouse name: Not on file    Number of children: Not on file    Years of education: Not on file    Highest education level: Not on file   Occupational History    Not on file   Tobacco Use    Smoking status: Passive Smoke Exposure - Never Smoker    Smokeless tobacco: Never Used   Substance and Sexual Activity    Alcohol use: Never    Drug use: Never    Sexual activity: Not on file   Other Topics Concern    Not on file   Social History Narrative    Not on file     Social Determinants of Health     Financial Resource Strain: Medium Risk    Difficulty of Paying Living Expenses: Somewhat hard   Food Insecurity: Food Insecurity Present    Worried About Running Out of Food in the Last Year: Sometimes true    [address] of Food in the Last Year: Sometimes true   Transportation Needs:     Lack of Transportation (Medical): Not on file    Lack of Transportation (Non-Medical):  Not on file   Physical Activity:     Days of Exercise per Week: Not on file    Minutes of Exercise per Session: Not on file   Stress:     Feeling of Stress : Not on file   Social Connections:     Frequency of Communication with Friends and Family: Not on file    Frequency of Social Gatherings with Friends and Family: Not on file    Attends Latter day Services: Not on file    Active Member of Clubs or Organizations: Not on file    Attends Club or Organization Meetings: Not on file    Marital Status: Not on file   Intimate Partner Violence:     Fear of Current or Ex-Partner: Not on file    Emotionally Abused: Not on file    Physically Abused: Not on file    Sexually Abused: Not on file   Housing Stability:     Unable to Pay for Housing in the Last Year: Not on file    Number of [place] in the Last Year: Not on file    Unstable Housing in the Last Year: Not on file       Family History   Problem Relation Age of Onset    Pancreatic Cancer Father     Arthritis Maternal Grandmother     Heart Attack Maternal Grandmother     Stroke Maternal Grandfather     Diabetes Maternal Grandfather        Allergies:  Patient has no known allergies. Home Medications:  Prior to Admission medications    Medication Sig Start Date End Date Taking? Authorizing Provider   amoxicillin-clavulanate (AUGMENTIN) 500-125 MG per tablet Take 1 tablet by mouth in the morning and at bedtime for 7 days  Patient not taking: Reported on 6/7/2022 6/1/22 6/8/22 Yes Gagandeep Verma MD   doxyLAMINE succinate (GNP SLEEP AID) 25 MG tablet Take 1 tablet by mouth nightly  Patient not taking: Reported on 6/7/2022 6/1/22  Yes Gagandeep Verma MD   vitamin B-6 (PYRIDOXINE) 50 MG tablet Take 0.5 tablets by mouth in the morning, at noon, and at bedtime  Patient not taking: Reported on 6/7/2022 6/1/22  Yes Gagandeep Verma MD   Prenatal Vit-Fe Fumarate-FA (PRENATAL VITAMIN) 27-0.8 MG TABS Take 1 tablet by mouth daily 5/4/22   Aidan Eagle MD   acetaminophen (TYLENOL) 500 MG tablet Take 2 tablets by mouth every 8 hours as needed for Pain 3/30/22 4/29/22  Myesha Paul MD       REVIEW OF SYSTEMS    (2-9 systems for level 4, 10 or more for level 5)      Review of Systems   Constitutional: Positive for appetite change. Negative for activity change, chills, fatigue and fever. HENT: Negative for congestion and sore throat. Respiratory: Negative for cough and shortness of breath. Gastrointestinal: Positive for nausea and vomiting.  Negative for abdominal pain, constipation and diarrhea. Genitourinary: Positive for vaginal discharge. Negative for dysuria, pelvic pain and vaginal bleeding. Skin: Negative for pallor and rash. Neurological: Positive for light-headedness. Negative for headaches. PHYSICAL EXAM   (up to 7 for level 4, 8 or more for level 5)      INITIAL VITALS:   /64   Pulse 78   Temp 98.2 °F (36.8 °C) (Oral)   Resp 16   LMP 04/08/2022 (Exact Date)   SpO2 99%     Physical Exam  Constitutional:       General: She is not in acute distress. Appearance: Normal appearance. She is not ill-appearing. HENT:      Head: Normocephalic and atraumatic. Mouth/Throat:      Mouth: Mucous membranes are moist.      Pharynx: Oropharynx is clear. Eyes:      Extraocular Movements: Extraocular movements intact. Pupils: Pupils are equal, round, and reactive to light. Cardiovascular:      Rate and Rhythm: Normal rate and regular rhythm. Pulses: Normal pulses. Heart sounds: Normal heart sounds. Pulmonary:      Effort: Pulmonary effort is normal.      Breath sounds: Normal breath sounds. Abdominal:      General: Bowel sounds are normal.      Palpations: Abdomen is soft. Tenderness: There is no abdominal tenderness. Musculoskeletal:         General: Normal range of motion. Skin:     General: Skin is warm and dry. Neurological:      General: No focal deficit present. Mental Status: She is alert and oriented to person, place, and time. Psychiatric:         Mood and Affect: Mood normal.         Behavior: Behavior normal.         Thought Content: Thought content normal.         Judgment: Judgment normal.         INITIAL IMPRESSION / DIFFERENTIAL  DIAGNOSIS / PLAN     INITIAL IMPRESSION / DDX:   Healthy 25 yof with some expected symptoms of nml first trimester, no yet confirmed IUP or formal dating ultrasound.   Will complete u/s, vag swabs, basic labs, treat nausea and re-eval.    EMERGENCY DEPARTMENT COURSE:  ED Course as of 06/08/22 0507   Wed Jun 01, 2022 1948 Headache, nausea, vomiting, lightheaded, also with several wks discharge [SM]   2010 Bedside u/s with , CRL difficult to obtain  [SM]   2032 Pelvic complete, white thin discharge, otherwise unremarkable [SM]   2107 hCG Quant(!): 101,150 [SM]   2139 GARDNERELLA VAGINALIS, DNA PROBE(!): POSITIVE [SM]   2139 Re-treat with metrogel and treat UTI, f/u OB next week [SM]   2140 Lightheadedness resolved, nausea improved [SM]      ED Course User Index  [SM] Nikos Jeffery MD       PLAN (Howie Jolley / Katharyn Primer / EKG):  Orders Placed This Encounter   Procedures    COVID-19, Rapid    RAPID INFLUENZA A/B ANTIGENS    C.trachomatis N.gonorrhoeae DNA    Vaginitis DNA Probe    Culture, Urine    Basic Metabolic Panel w/ Reflex to MG    Magnesium    Urinalysis with Microscopic    HCG, Quantitative, Pregnancy       MEDICATIONS ORDERED:  Orders Placed This Encounter   Medications    doxyLAMINE succinate (GNP SLEEP AID) tablet 25 mg    vitamin B-6 (PYRIDOXINE) tablet 50 mg    0.9 % sodium chloride bolus    magnesium sulfate 1000 mg in dextrose 5% 100 mL IVPB    magnesium sulfate 1-5 GM/100ML-% IVPB (premix)     Alexia Dowell: cabinet override    amoxicillin-clavulanate (AUGMENTIN) 500-125 MG per tablet     Sig: Take 1 tablet by mouth in the morning and at bedtime for 7 days     Dispense:  14 tablet     Refill:  0    metroNIDAZOLE (METROGEL) 0.75 % vaginal gel     Sig: Place 1 Applicatorful vaginally nightly for 5 days     Dispense:  70 g     Refill:  0    doxyLAMINE succinate (GNP SLEEP AID) 25 MG tablet     Sig: Take 1 tablet by mouth nightly     Dispense:  30 tablet     Refill:  0    vitamin B-6 (PYRIDOXINE) 50 MG tablet     Sig: Take 0.5 tablets by mouth in the morning, at noon, and at bedtime     Dispense:  60 tablet     Refill:  0       DIAGNOSTIC RESULTS / PROCEDURES / CONSULTS   LAB RESULTS:  Results for orders placed or performed during the hospital encounter of 06/01/22   COVID-19, Rapid    Specimen: Nasopharyngeal Swab   Result Value Ref Range    Specimen Description . NASOPHARYNGEAL SWAB     SARS-CoV-2, Rapid Not Detected Not Detected   RAPID INFLUENZA A/B ANTIGENS    Specimen: Nasopharyngeal   Result Value Ref Range    Flu A Antigen NEGATIVE NEGATIVE    Flu B Antigen NEGATIVE NEGATIVE   C.trachomatis N.gonorrhoeae DNA    Specimen: Cervix   Result Value Ref Range    Specimen Description . CERVIX     C. trachomatis DNA NEGATIVE NEGATIVE    N. gonorrhoeae DNA NEGATIVE NEGATIVE   Vaginitis DNA Probe    Specimen: Vaginal   Result Value Ref Range    Source . VAGINAL SWAB     Trichomonas Vaginalis DNA NEGATIVE NEGATIVE    GARDNERELLA VAGINALIS, DNA PROBE POSITIVE (A) NEGATIVE    CANDIDA SPECIES, DNA PROBE NEGATIVE NEGATIVE   Culture, Urine    Specimen: Urine, clean catch   Result Value Ref Range    Specimen Description . CLEAN CATCH URINE     Culture NO SIGNIFICANT GROWTH    Basic Metabolic Panel w/ Reflex to MG   Result Value Ref Range    Glucose 98 70 - 99 mg/dL    BUN 8 6 - 20 mg/dL    CREATININE 0.55 0.50 - 0.90 mg/dL    Calcium 10.0 8.6 - 10.4 mg/dL    Sodium 137 135 - 144 mmol/L    Potassium 4.3 3.7 - 5.3 mmol/L    Chloride 98 98 - 107 mmol/L    CO2 22 20 - 31 mmol/L    Anion Gap 17 9 - 17 mmol/L    GFR Non-African American  >60 mL/min     Pediatric GFR requires additional information. Refer to [facility] website for calculator.     GFR Comment         Magnesium   Result Value Ref Range    Magnesium 1.9 1.7 - 2.2 mg/dL   Urinalysis with Microscopic   Result Value Ref Range    Color, UA Yellow Yellow    Turbidity UA Cloudy (A) Clear    Glucose, Ur NEGATIVE NEGATIVE    Bilirubin Urine NEGATIVE NEGATIVE    Ketones, Urine MODERATE (A) NEGATIVE    Specific Gravity, UA 1.023 1.005 - 1.030    Urine Hgb NEGATIVE NEGATIVE    pH, UA 6.5 5.0 - 8.0    Protein, UA TRACE (A) NEGATIVE    Urobilinogen, Urine Normal Normal    Nitrite, Urine NEGATIVE NEGATIVE    Leukocyte Esterase, Urine TRACE (A) NEGATIVE    -          WBC, UA 10 TO 20 0 - 5 /HPF    RBC, UA 2 TO 5 0 - 2 /HPF    Casts UA 2 TO 5 0 - 2 /LPF    Casts UA HYALINE 0 - 2 /LPF    Epithelial Cells UA 20 TO 50 0 - 5 /HPF    Bacteria, UA MODERATE (A) None    Mucus, UA 2+ (A) None   HCG, Quantitative, Pregnancy   Result Value Ref Range    hCG Quant 101,150 (H) <5 mIU/mL       RADIOLOGY:  No results found. PROCEDURES:  Procedures     CONSULTS:  None      FINAL IMPRESSION      1. Acute cystitis without hematuria    2. Bacterial vaginosis in pregnancy    3. Nausea and vomiting during pregnancy          DISPOSITION / PLAN     DISPOSITION Decision To Discharge 06/01/2022 09:07:07 PM      Discharge instructions discussed with pt and they expressed understanding. Pt appears to have good decision making capacity. All questions and concerns addressed. Provided with written discharge instructions.       PATIENT REFERRED TO:  JAM Irvin - [place]  [facility] [place] [id number]  [phone]    Call in 1 day  for follow up as needed    your OB    Go on 6/7/2022  your appt next week      DISCHARGE MEDICATIONS:  Discharge Medication List as of 6/1/2022  9:53 PM      START taking these medications    Details   amoxicillin-clavulanate (AUGMENTIN) 500-125 MG per tablet Take 1 tablet by mouth in the morning and at bedtime for 7 days, Disp-14 tablet, R-0Normal      metroNIDAZOLE (METROGEL) 0.75 % vaginal gel Place 1 Applicatorful vaginally nightly for 5 days, Vaginal, NIGHTLY Starting Wed 6/1/2022, Until Mon 6/6/2022, For 5 days, Disp-70 g, R-0, Normal      doxyLAMINE succinate (GNP SLEEP AID) 25 MG tablet Take 1 tablet by mouth nightly, Disp-30 tablet, R-0Normal      vitamin B-6 (PYRIDOXINE) 50 MG tablet Take 0.5 tablets by mouth in the morning, at noon, and at bedtime, Disp-60 tablet, R-0Normal             Lolita Mancera MD  Emergency Medicine Resident    (Please note that portions of thisnote were completed with a voice recognition program.  Efforts were made to edit the dictations but occasionally words are mis-transcribed.)     Armani Atwood MD  Resident  06/08/22 3205

## 2022-06-02 NOTE — ED PROVIDER NOTES
I performed a history and physical examination of the patient and discussed management with the resident. I reviewed the residents note and agree with the documented findings and plan of care. Any areas of disagreement are noted on the chart. I was personally present for the key portions of any procedures. I have documented in the chart those procedures where I was not present during the key portions. I have reviewed the emergency nurses triage note. I agree with the chief complaint, past medical history, past surgical history, allergies, medications, social and family history as documented unless otherwise noted below. Documentation of the HPI, Physical Exam and Medical Decision Making performed by medical students or scribes is based on my personal performance of the HPI, PE and MDM. For Phys Assistant/ Nurse Practitioner cases/documentation I have personally evaluated this patient and have completed at least one if not all key elements of the E/M (history, physical exam, and MDM). I find the patient's history and physical exam are consistent with the NP/PA documentation. I agree with the care provided, treatment rendered, disposition and followup plan. Additional findings are as noted. Shayy Buck. Roxane Quiñones MD  Attending Emergency  Physician        This 68-year-old female presents emerged part with complaints of nausea and vomiting for the past several days. She denies any fevers or chills. No abdominal pain. No vaginal bleeding. She is also complaining of persistent vaginal discharge for the past week or so. No dysuria, hematuria, frequency, urgency. Patient is a  4 para 1 SAB 3 with last normal menstrual period 2022. Patient was evaluated here approximately 10 days ago with complaints of chest discomfort. She denies any chest pain since. She is taking prenatal vitamins. No history of trauma. No hematemesis, melena, hematochezia. No diarrhea. P.o.  intake is been normal.  Patient was evaluated here and diagnosed with bacterial vaginosis. She received a prescription for metronidazole gel which she indicates she is used however she indicates that her vaginal symptoms have persisted. Awake, alert, coop, responsive. Speech fluent, normal comprehension. Lungs clear bilaterally. No rales, rhonchi, wheezes, stridor, retractions. Cardiac-S1S2, RRR, no murmur, rub, or gallop. Abdomen soft, nondistended, nontender. No organomegaly, mass, bruit. Normal bowel sounds. Point-of-care ultrasound demonstrates an intrauterine pregnancy with good fetal cardiac activity and a heart rate in the 170s. Impression: Pregnancy related emesis. Plan: We will provide IV fluids as well as doses of doxylamine and B6 and reassess.      Elvie Warner MD  06/01/22 2041

## 2022-06-02 NOTE — ED NOTES
Pt states 2 mo. Pregnant approx. and woke up with N/V, has had three previous miscarriages.  Also c/o vaginal discharge      Lokesh Moody, SAI  06/2000

## 2022-06-02 NOTE — ED NOTES
The following labs labeled with pt sticker and tubed to lab:     [] Blue     [x] Lavender   [] on ice  [x] Green/yellow  [] Green/black [] on ice  [] Yellow  [] Red  [] Pink      [] COVID-19 swab    [] Rapid  [] PCR  [] Flu swab  [] Peds Viral Panel     [x] Urine Sample  [] Pelvic Cultures  [] Blood Cultures            Olga Allen, SAI  06/01/22 2015

## 2022-06-03 LAB
CULTURE: NORMAL
SPECIMEN DESCRIPTION: NORMAL

## 2022-06-07 ENCOUNTER — OFFICE VISIT (OUTPATIENT)
Dept: OBGYN | Age: 22
End: 2022-06-07
Payer: COMMERCIAL

## 2022-06-07 ENCOUNTER — TELEPHONE (OUTPATIENT)
Dept: OBGYN | Age: 22
End: 2022-06-07

## 2022-06-07 VITALS
SYSTOLIC BLOOD PRESSURE: 132 MMHG | HEIGHT: 62 IN | WEIGHT: 171 LBS | DIASTOLIC BLOOD PRESSURE: 83 MMHG | BODY MASS INDEX: 31.47 KG/M2

## 2022-06-07 DIAGNOSIS — Z34.90 PREGNANCY, UNSPECIFIED GESTATIONAL AGE: Primary | ICD-10-CM

## 2022-06-07 PROCEDURE — 99213 OFFICE O/P EST LOW 20 MIN: CPT | Performed by: OBSTETRICS & GYNECOLOGY

## 2022-06-07 PROCEDURE — 1036F TOBACCO NON-USER: CPT | Performed by: OBSTETRICS & GYNECOLOGY

## 2022-06-07 PROCEDURE — G8427 DOCREV CUR MEDS BY ELIG CLIN: HCPCS | Performed by: OBSTETRICS & GYNECOLOGY

## 2022-06-07 PROCEDURE — G8417 CALC BMI ABV UP PARAM F/U: HCPCS | Performed by: OBSTETRICS & GYNECOLOGY

## 2022-06-07 NOTE — PROGRESS NOTES
Maria Luisa Whyte  2022    YOB: 2000          The patient was seen today. She is here regarding ER follow up . Her bowels are regular and she is voiding without difficulty. HPI:  Maria Luisa Whyte is a 25 y.o. female      Patient states she went to the ER on 22 for pelvic cramping in pregnancy. That symptom has since resolved. She denies any vaginal bleeding/leaking of fluids/discharge. This is a planned pregnancy. Patient is already on PNV. She had a dating ultrasound performed today--results pending. Patient is still having some nausea/vomiting. She vomits 1-2 times a day. She states it is tolerable. I educated her that the N/V of pregnancy usually resolves after the first trimester. If the N/V worsens, I encouraged her to let us know and further medications can be called in.      OB History    Para Term  AB Living   4 0 0 0 3 0   SAB IAB Ectopic Molar Multiple Live Births   3 0 0 0 0 0      # Outcome Date GA Lbr Moiz/2nd Weight Sex Delivery Anes PTL Lv   4 Current            3 2021     SAB      2 SAB      SAB      1 2020               Past Medical History:   Diagnosis Date    BV (bacterial vaginosis)     Chlamydia infection 2021    Closed fracture of phalanx of middle finger 2016    Gonorrhea 2021    Seasonal allergies     Strep throat     Trichomonas infection 2022       No past surgical history on file.     Family History   Problem Relation Age of Onset    Pancreatic Cancer Father     Arthritis Maternal Grandmother     Heart Attack Maternal Grandmother     Stroke Maternal Grandfather     Diabetes Maternal Grandfather        Social History     Socioeconomic History    Marital status: Single     Spouse name: Not on file    Number of children: Not on file    Years of education: Not on file    Highest education level: Not on file   Occupational History    Not on file   Tobacco Use    Smoking status: Passive Smoke Exposure - Never Smoker    Smokeless tobacco: Never Used   Substance and Sexual Activity    Alcohol use: Never    Drug use: Never    Sexual activity: Not on file   Other Topics Concern    Not on file   Social History Narrative    Not on file     Social Determinants of Health     Financial Resource Strain: Medium Risk    Difficulty of Paying Living Expenses: Somewhat hard   Food Insecurity: Food Insecurity Present    Worried About Running Out of Food in the Last Year: Sometimes true    Lit of Food in the Last Year: Sometimes true   Transportation Needs:     Lack of Transportation (Medical): Not on file    Lack of Transportation (Non-Medical):  Not on file   Physical Activity:     Days of Exercise per Week: Not on file    Minutes of Exercise per Session: Not on file   Stress:     Feeling of Stress : Not on file   Social Connections:     Frequency of Communication with Friends and Family: Not on file    Frequency of Social Gatherings with Friends and Family: Not on file    Attends Rastafarian Services: Not on file    Active Member of [address] [place] [address] or Organizations: Not on file    Attends Club or Organization Meetings: Not on file    Marital Status: Not on file   Intimate Partner Violence:     Fear of Current or Ex-Partner: Not on file    Emotionally Abused: Not on file    Physically Abused: Not on file    Sexually Abused: Not on file   Housing Stability:     Unable to Pay for Housing in the Last Year: Not on file    Number of [place] in the Last Year: Not on file    Unstable Housing in the Last Year: Not on file         MEDICATIONS:  Current Outpatient Medications   Medication Sig Dispense Refill    Prenatal Vit-Fe Fumarate-FA (PRENATAL VITAMIN) 27-0.8 MG TABS Take 1 tablet by mouth daily 30 tablet 3    amoxicillin-clavulanate (AUGMENTIN) 500-125 MG per tablet Take 1 tablet by mouth in the morning and at bedtime for 7 days (Patient not taking: Reported on 6/7/2022) 14 tablet 0    doxyLAMINE succinate (GNP SLEEP AID) 25 MG tablet Take 1 tablet by mouth nightly (Patient not taking: Reported on 6/7/2022) 30 tablet 0    vitamin B-6 (PYRIDOXINE) 50 MG tablet Take 0.5 tablets by mouth in the morning, at noon, and at bedtime (Patient not taking: Reported on 6/7/2022) 60 tablet 0    acetaminophen (TYLENOL) 500 MG tablet Take 2 tablets by mouth every 8 hours as needed for Pain 180 tablet 1     No current facility-administered medications for this visit. ALLERGIES:  Allergies as of 06/07/2022    (No Known Allergies)         REVIEW OF SYSTEMS:       A minimum of an eleven point review of systems was completed. Review Of Systems (11 point):  Constitutional: No fever, chills or malaise; No weight change or fatigue  Head and Eyes: No vision, Headache, Dizziness or trauma in last 12 months  ENT ROS: No hearing, Tinnitis, sinus or taste problems  Hematological and Lymphatic ROS:No Lymphoma, Von Willebrand's, Hemophillia or Bleeding History  Psych ROS: No Depression, Homicidal thoughts,suicidal thoughts, or anxiety  Breast ROS: No prior breast abnormalities or lumps  Respiratory ROS: No SOB, Pneumoniae,Cough, or Pulmonary Embolism History  Cardiovascular ROS: No Chest Pain with Exertion, Palpitations, Syncope, Edema, Arrhythmia  Gastrointestinal ROS:+N/V of pregnancy. No Indigestion, Heartburn, Nausea, vomiting, Diarrhea, Constipation,or Bowel Changes; No Bloody Stools or melena  Genito-Urinary ROS: No Dysuria, Hematuria or Nocturia. No Urinary Incontinence or Vaginal Discharge  Musculoskeletal ROS: No Arthralgia, Arthritis,Gout,Osteoporosis or Rheumatism  Neurological ROS: No CVA, Migraines, Epilepsy, Seizure Hx, or Limb Weakness  Dermatological ROS: No Rash, Itching, Hives, Mole Changes or Cancer          Blood pressure 132/83, height 5' 2\" (1.575 m), weight 171 lb (77.6 kg), last menstrual period 04/08/2022, unknown if currently breastfeeding. Abdomen: Soft non-tender; good bowel sounds.  No guarding, rebound or rigidity. No CVA tenderness bilaterally. Extremities: No calf tenderness, DTR 2/4, and No edema bilaterally        Diagnostics:  US OB TRANSVAGINAL    Result Date: 2022  EXAMINATION: FIRST TRIMESTER OBSTETRIC ULTRASOUND 2022 TECHNIQUE: Transvaginal first trimester obstetric pelvic ultrasound was performed with color Doppler flow evaluation. COMPARISON: None HISTORY: ORDERING SYSTEM PROVIDED HISTORY: rule out ectopic TECHNOLOGIST PROVIDED HISTORY: rule out ectopic LMP was 2022.  FINDINGS: Uterus: Uterus measures 8.5 x 4.0 x 5.8 cm. Endometrium measures 1.7 cm in thickness. There is an oval intrauterine fluid collection, measuring 1.1 x 0.4 x 1.2 cm (mean size of 0.88 cm), which is too small to estimate gestational age (presuming this is a gestational sac). There is no evidence of fetal pole or convincing evidence of yolk sac at this time. Right ovary: Right ovary measures 4.0 x 3.2 x 3.0 cm. There are multiple follicles in the right ovary. Complex cyst measures 3.0 x 2.0 cm, likely a corpus luteal cyst.  Color Doppler flow demonstrated. Left ovary: Left ovary measures 3.8 x 2.2 x 1.6 cm. Unremarkable appearance. Color Doppler flow demonstrated. Free fluid: Tiny amount of free fluid is noted in the pelvic cul-de-sac. Single intrauterine fluid collection, likely a gestational sac, which is too small to estimate gestational age. Viability of the pregnancy is indeterminate, as fetal pole is not yet visualized. Short-term follow-up of beta hCG trend and possible follow-up pelvic ultrasound are recommended. Lab Results:  Results for orders placed or performed during the hospital encounter of 22   COVID-19, Rapid    Specimen: Nasopharyngeal Swab   Result Value Ref Range    Specimen Description . NASOPHARYNGEAL SWAB     SARS-CoV-2, Rapid Not Detected Not Detected   RAPID INFLUENZA A/B ANTIGENS    Specimen: Nasopharyngeal   Result Value Ref Range    Flu A Antigen NEGATIVE NEGATIVE    Flu B Antigen NEGATIVE NEGATIVE   C.trachomatis N.gonorrhoeae DNA    Specimen: Cervix   Result Value Ref Range    Specimen Description . CERVIX     C. trachomatis DNA NEGATIVE NEGATIVE    N. gonorrhoeae DNA NEGATIVE NEGATIVE   Vaginitis DNA Probe    Specimen: Vaginal   Result Value Ref Range    Source . VAGINAL SWAB     Trichomonas Vaginalis DNA NEGATIVE NEGATIVE    GARDNERELLA VAGINALIS, DNA PROBE POSITIVE (A) NEGATIVE    CANDIDA SPECIES, DNA PROBE NEGATIVE NEGATIVE   Culture, Urine    Specimen: Urine, clean catch   Result Value Ref Range    Specimen Description . CLEAN CATCH URINE     Culture NO SIGNIFICANT GROWTH    Basic Metabolic Panel w/ Reflex to MG   Result Value Ref Range    Glucose 98 70 - 99 mg/dL    BUN 8 6 - 20 mg/dL    CREATININE 0.55 0.50 - 0.90 mg/dL    Calcium 10.0 8.6 - 10.4 mg/dL    Sodium 137 135 - 144 mmol/L    Potassium 4.3 3.7 - 5.3 mmol/L    Chloride 98 98 - 107 mmol/L    CO2 22 20 - 31 mmol/L    Anion Gap 17 9 - 17 mmol/L    GFR Non-African American  >60 mL/min     Pediatric GFR requires additional information. Refer to [facility] website for calculator.     GFR Comment         Magnesium   Result Value Ref Range    Magnesium 1.9 1.7 - 2.2 mg/dL   Urinalysis with Microscopic   Result Value Ref Range    Color, UA Yellow Yellow    Turbidity UA Cloudy (A) Clear    Glucose, Ur NEGATIVE NEGATIVE    Bilirubin Urine NEGATIVE NEGATIVE    Ketones, Urine MODERATE (A) NEGATIVE    Specific Gravity, UA 1.023 1.005 - 1.030    Urine Hgb NEGATIVE NEGATIVE    pH, UA 6.5 5.0 - 8.0    Protein, UA TRACE (A) NEGATIVE    Urobilinogen, Urine Normal Normal    Nitrite, Urine NEGATIVE NEGATIVE    Leukocyte Esterase, Urine TRACE (A) NEGATIVE    -          WBC, UA 10 TO 20 0 - 5 /HPF    RBC, UA 2 TO 5 0 - 2 /HPF    Casts UA 2 TO 5 0 - 2 /LPF    Casts UA HYALINE 0 - 2 /LPF    Epithelial Cells UA 20 TO 50 0 - 5 /HPF    Bacteria, UA MODERATE (A) None    Mucus, UA 2+ (A) None   HCG, Quantitative, Pregnancy   Result Value Ref Range    hCG Quant 101,150 (H) <5 mIU/mL         Assessment:   Diagnosis Orders   1. Pregnancy, unspecified gestational age       Patient Active Problem List    Diagnosis Date Noted    Early stage of pregnancy 05/12/2022     Priority: Medium           PLAN:    Dating ultrasound performed today (results pending) - ER did a limited bedside ultrasound to confirm fetal heart beat on 6/1/22    Patient already taking PNV. She denies any past medical issues. She is using the doxylamine/vitamin B6. She denies taking any other medications. Return asap, for New OB education and then an Initial Prenatal appt with physician. Repeat Annual every 1 year  Cervical Cytology Evaluation begins at 24years old. If Negative Cytology, Follow-up screening per current guidelines. Counseled on preventative health maintenance follow-up. The patient, Lizabeth Cifuentes is a 25 y.o. female, was seen with a total time spent of 20 minutes for the visit on this date of service by the E/M provider. The time component had both face to face and non face to face time spent in determining the total time component. Counseling and education regarding her diagnosis listed below and her options regarding those diagnoses were also included in determining her time component. Diagnosis Orders   1. Pregnancy, unspecified gestational age          The patient had her preventative health maintenance recommendations and follow-up reviewed with her at the completion of her visit.     Francesco Pham DO

## 2022-06-07 NOTE — TELEPHONE ENCOUNTER
----- Message from Jeannette Jackson RN sent at 2022  2:58 PM EDT -----  [facility] ED  Please schedule her on 22 at 11 am .     Thank you-Chandni  ----- Message -----  From: Hansa Salgado  Sent: 2022  10:00 AM EDT  To: Jeannette Jackson RN    Patient is currently 8w 6d.   Can you please help find an ob education spot for her

## 2022-06-08 ASSESSMENT — ENCOUNTER SYMPTOMS
VOMITING: 1
NAUSEA: 1
SORE THROAT: 0
ABDOMINAL PAIN: 0
CONSTIPATION: 0
DIARRHEA: 0
COUGH: 0
SHORTNESS OF BREATH: 0

## 2022-06-16 ENCOUNTER — SCHEDULED TELEPHONE ENCOUNTER (OUTPATIENT)
Dept: OBGYN | Age: 22
End: 2022-06-16

## 2022-06-16 DIAGNOSIS — O99.211 OBESITY AFFECTING PREGNANCY IN FIRST TRIMESTER: ICD-10-CM

## 2022-06-16 DIAGNOSIS — O09.91 HIGH-RISK PREGNANCY IN FIRST TRIMESTER: Primary | ICD-10-CM

## 2022-06-16 DIAGNOSIS — A74.9 CHLAMYDIA: ICD-10-CM

## 2022-06-16 DIAGNOSIS — A54.9 GONORRHEA: ICD-10-CM

## 2022-06-16 DIAGNOSIS — A59.01 TRICHOMONAS VAGINALIS (TV) INFECTION: ICD-10-CM

## 2022-06-16 DIAGNOSIS — Z20.2 EXPOSURE TO GENITAL HERPES: ICD-10-CM

## 2022-06-16 DIAGNOSIS — N96 HISTORY OF RECURRENT MISCARRIAGES: ICD-10-CM

## 2022-06-16 DIAGNOSIS — Z28.311 COVID-19 VACCINE SERIES NOT COMPLETED: ICD-10-CM

## 2022-06-16 DIAGNOSIS — Z28.9 COVID-19 VACCINE SERIES NOT COMPLETED: ICD-10-CM

## 2022-06-16 PROBLEM — O09.90 HIGH-RISK PREGNANCY: Status: ACTIVE | Noted: 2022-06-16

## 2022-06-16 PROBLEM — Z34.90 EARLY STAGE OF PREGNANCY: Status: RESOLVED | Noted: 2022-05-12 | Resolved: 2022-06-16

## 2022-06-16 RX ORDER — PNV NO.95/FERROUS FUM/FOLIC AC 28MG-0.8MG
1 TABLET ORAL DAILY
Qty: 30 TABLET | Refills: 12 | Status: CANCELLED | OUTPATIENT
Start: 2022-06-16

## 2022-06-16 NOTE — PROGRESS NOTES
Bharath Tran is a 25 y.o. female evaluated via telephone on 6/16/2022 for Other (OB intake, RN)  . Documentation:  I communicated with the patient and/or health care decision maker about the OB intake. Details of this discussion including any medical advice provided: see notes    Total Time: minutes: 21-30 minutes    Bharath Tran was evaluated through a synchronous (real-time) audio encounter. Patient identification was verified at the start of the visit. She (or guardian if applicable) is aware that this is a billable service, which includes applicable co-pays. This visit was conducted with the patient's (and/or legal guardian's) verbal consent. She has not had a related appointment within my department in the past 7 days or scheduled within the next 24 hours. The patient was located at Home: [address] [place] [address]. The provider was located at [facility] (Appt Dept): [address],  [address] [facility]. Note: not billable if this call serves to triage the patient into an appointment for the relevant concern    Va Epstein RN      First Trimester Plans/Education completed per ACOG Guidelines. Pt counseled and verbalizes understanding. Routine Prenatal Tests,  Risk Factors Identified By Prenatal History, Anticipated Course of Prenatal Care, Nutrition and Weight Gain Counseling, Toxoplasmosis Precautions ( cats/raw meat), Sexual Activity, Exercise, Influenza/Tdap Vaccine, Smoking Counseling, Environmental/Work Hazards, Travel, Alcohol, Illicit/Recreational Drugs, Use Of Any Medications, Indications for Ultrasound, Domestic Violence, Seat Belt Use, Dental Care , Childbirth Classes/Hospital Facilities. Risk factors: Obesity (BMI 30.17), chlamydia infection in pregnancy, COVID vaccine series not completed, recurrent miscarriage (x3)      Obstetric education and intake completed today.      Patient occupation: Employed, gas station  Patient lives with: grandmother  Primary Care Provider: none  Recent ER visits: Yes  Planned/ unplanned pregnancy: unplanned  Father of baby: Same as G1-G3 SAB               LMP: Exact             Menses monthly: Yes  BCP at conception: No  Dating ultrasound: Completed  Delivery type history: N/A  History of spontaneous  delivery: No  Varicella history: Never infected, never vaccinated  Covid Vaccine: COVID vaccinated, partially; declines additional vaccines  Flu Vaccine in pregnancy: No   TDAP Vaccine in pregnancy: No   Blood transfusion acceptable: Yes   Last Pap: never            Report available: n/a  First Trimester Screen/NIPT/MSAFP/Quad: maternal fetal medicine consult for first trimester screening  Initial prenatal labs ordered today: Yes  Smoker: non-smoker  Pre-Gravid BMI: 30.17, 30-34.9 - Obesity Grade I  Recommend weight gain: Obese (BMI over 30): Gain 11 to 20 pounds  Substance abuse history: No  Depression/anxiety history: No  Domestic abuse history: No  Dental care: Reviewed, patient verbalizes understanding  Reviewed how to reach Ob/Gyn resident after hours: Reviewed, patient verbalizes understanding

## 2022-06-30 ENCOUNTER — INITIAL PRENATAL (OUTPATIENT)
Dept: OBGYN | Age: 22
End: 2022-06-30
Payer: COMMERCIAL

## 2022-06-30 VITALS
SYSTOLIC BLOOD PRESSURE: 149 MMHG | DIASTOLIC BLOOD PRESSURE: 91 MMHG | HEART RATE: 117 BPM | WEIGHT: 169 LBS | BODY MASS INDEX: 30.91 KG/M2

## 2022-06-30 DIAGNOSIS — O09.91 HIGH-RISK PREGNANCY IN FIRST TRIMESTER: ICD-10-CM

## 2022-06-30 DIAGNOSIS — O10.919 HTN IN PREGNANCY, CHRONIC: ICD-10-CM

## 2022-06-30 DIAGNOSIS — Z3A.11 11 WEEKS GESTATION OF PREGNANCY: ICD-10-CM

## 2022-06-30 DIAGNOSIS — Z20.2 EXPOSURE TO GENITAL HERPES: Primary | ICD-10-CM

## 2022-06-30 PROCEDURE — G8417 CALC BMI ABV UP PARAM F/U: HCPCS | Performed by: STUDENT IN AN ORGANIZED HEALTH CARE EDUCATION/TRAINING PROGRAM

## 2022-06-30 PROCEDURE — 99213 OFFICE O/P EST LOW 20 MIN: CPT | Performed by: STUDENT IN AN ORGANIZED HEALTH CARE EDUCATION/TRAINING PROGRAM

## 2022-06-30 PROCEDURE — 1036F TOBACCO NON-USER: CPT | Performed by: STUDENT IN AN ORGANIZED HEALTH CARE EDUCATION/TRAINING PROGRAM

## 2022-06-30 PROCEDURE — G8427 DOCREV CUR MEDS BY ELIG CLIN: HCPCS | Performed by: STUDENT IN AN ORGANIZED HEALTH CARE EDUCATION/TRAINING PROGRAM

## 2022-06-30 RX ORDER — ASPIRIN 81 MG/1
81 TABLET ORAL DAILY
Qty: 90 TABLET | Refills: 1 | Status: SHIPPED | OUTPATIENT
Start: 2022-06-30

## 2022-06-30 NOTE — PROGRESS NOTES
[facility] OB/GYN  Initial Prenatal Visit    CC: Initial Prenatal Visit    HPI:   Tawanda Negrete is a 25 y.o. female  at [address] [place] [address]  She is being seen today for her first obstetrical visit. Pregnancy history fully reviewed. This is not a planned pregnancy. Her LMP is Patient's last menstrual period was 2022 (exact date). Her obstetrical history is significant for nothing. The patient was seen and evaluated. The patient complains of nothing at this time aside from nausea and vomiting which she is taking B6 for. There was absent fetal movements this far. She denies contractions, vaginal bleeding and leakage of fluid. She currently denies any signs or symptoms of pre-eclampsia which include headache, vision changes, RUQ pain. The patient requested the T-Dap Vaccine (27-36 weeks) this pregnancy. The patient is Rh positive  and Rhogam is not indicated in this pregnancy,  The patient declined the COVID-19 vaccine this year. Relationship with FOB: boyfriend  Mother's ethnicity:   Father's ethnicity:   Family History:    - Neural tube defects: No   - Congenital birth defects (congenital heart defects, polydactyly, cleft lip/palate): No   - Intellectual disability: No   - Genetic disorders/chromosomal abnormalities: No   - Diabetes mellitus in first degree relatives: No  Genetic screening was discussed and patient declined. OB History:  OB History    Para Term  AB Living   4 0 0 0 3 0   SAB IAB Ectopic Molar Multiple Live Births   3 0 0 0 0 0      # Outcome Date GA Lbr Moiz/2nd Weight Sex Delivery Anes PTL Lv   4 Current            3 2021     SAB      2 2021     SAB      1 2020              Obstetric Comments   G1-G4: FOB#1       Past Medical History:  Past Medical History:   Diagnosis Date    BV (bacterial vaginosis)     Chlamydia     3/22;  ToR ,     Chlamydia infection 2021    Chlamydia infection affecting pregnancy 3/2022, 2021 ToR 5/2022, 6/2022    cHTN (no meds) 6/30/2022    Closed fracture of phalanx of middle finger 2016    Gonorrhea 07/2021    History of blood transfusion     Transfused after SAB 2021    Seasonal allergies     Strep throat     Trichomonas infection 02/2022    Trichomonas vaginitis 02/2021       Past Surgical History:  History reviewed. No pertinent surgical history. Medications:  Current Outpatient Medications on File Prior to Visit   Medication Sig Dispense Refill    Prenatal Vit-Fe Fumarate-FA (PRENATAL VITAMIN) 27-0.8 MG TABS Take 1 tablet by mouth daily 30 tablet 3    doxyLAMINE succinate (GNP SLEEP AID) 25 MG tablet Take 1 tablet by mouth nightly (Patient not taking: Reported on 6/7/2022) 30 tablet 0    vitamin B-6 (PYRIDOXINE) 50 MG tablet Take 0.5 tablets by mouth in the morning, at noon, and at bedtime (Patient not taking: Reported on 6/30/2022) 60 tablet 0    acetaminophen (TYLENOL) 500 MG tablet Take 2 tablets by mouth every 8 hours as needed for Pain (Patient not taking: Reported on 6/30/2022) 180 tablet 1     No current facility-administered medications on file prior to visit. Allergies:   Allergies as of 06/30/2022    (No Known Allergies)       Social History:  Social History     Socioeconomic History    Marital status: Single     Spouse name: Not on file    Number of children: Not on file    Years of education: Not on file    Highest education level: Not on file   Occupational History    Not on file   Tobacco Use    Smoking status: Passive Smoke Exposure - Never Smoker    Smokeless tobacco: Never Used   Vaping Use    Vaping Use: Never used    Passive vaping exposure: Yes   Substance and Sexual Activity    Alcohol use: Not Currently     Comment: Not during pregnancy (last drink summer 2021)    Drug use: Never    Sexual activity: Yes     Partners: Male   Other Topics Concern    Not on file   Social History Narrative    Not on file     Social Determinants of Health Financial Resource Strain: Medium Risk    Difficulty of Paying Living Expenses: Somewhat hard   Food Insecurity: Food Insecurity Present    Worried About Running Out of Food in the Last Year: Sometimes true    Lit of Food in the Last Year: Sometimes true   Transportation Needs:     Lack of Transportation (Medical): Not on file    Lack of Transportation (Non-Medical):  Not on file   Physical Activity:     Days of Exercise per Week: Not on file    Minutes of Exercise per Session: Not on file   Stress:     Feeling of Stress : Not on file   Social Connections:     Frequency of Communication with Friends and Family: Not on file    Frequency of Social Gatherings with Friends and Family: Not on file    Attends [employer] Services: Not on file    Active Member of [address] [place] [address] [employer] or Organizations: Not on file    Attends Club or Organization Meetings: Not on file    Marital Status: Not on file   Intimate Partner Violence:     Fear of Current or Ex-Partner: Not on file    Emotionally Abused: Not on file    Physically Abused: Not on file    Sexually Abused: Not on file   Housing Stability:     Unable to Pay for Housing in the Last Year: Not on file    Number of [place] in the Last Year: Not on file    Unstable Housing in the Last Year: Not on file       Family History:  Family History   Problem Relation Age of Onset    Pancreatic Cancer Father     Arthritis Maternal Grandmother     Heart Attack Maternal Grandmother     Stroke Maternal Grandfather     Diabetes Maternal Grandfather     No Known Problems Paternal Grandfather     No Known Problems Paternal Grandmother     No Known Problems Mother     No Known Problems Brother     No Known Problems Sister        Vitals:  BP: (!) 149/91  Weight: 169 lb (76.7 kg)  Heart Rate: (!) 117  Patient Position: Sitting  Albumin: Trace  Glucose: Negative     Physical Exam: Completed, See Epic Navigator   Chaperone for Intimate Exam: Chaperone was present for entire exam, Chaperone Name: SAI Leblanc       Assessment & Plan:  Tawanda Negrete is a 25 y.o. female  at 11w10d Initial Obstetrical Visit   - The patient was seen full history and physical was completed/reviewed. - Prenatal labs ordered   - Prenatal vitamins prescription Given   - Aspirin indication: indicated due to High risk factors: chronic hypertension, Moderate risk factors: BMI >30- Rx given   - Problem list reviewed and updated   - NT Screen/Quad Testing and MSAFP Single Marker/NIPT: requested, lab ordered   - Role of ultrasound in pregnancy discussed; requests fetal survey, MFM referral ordered   - Gc/Chlam Cultures & Vaginitis:collected today    - Last pap smear N/A    - Tdap vaccination: discussed recommendations for TDAP immunization, patient requested. - Rhogam: not indicated   - COVID-19 vaccination: R/B/A discussed with increased risk of both maternal and fetal morbidity and mortality in unvaccinated pregnant patients who contract COVID-19- patient declined today   - Indications for  section: none   - Patient had exposure to genital herpes with current partner, denies any prodromal symptoms or outbreaks    cHTN (new dx)    - patient meets criteria for chronic hypertension    - baseline PreE labs ordered at this time, including P/C    - BP elevated today    - ASA 81 mg Rx given today    - will continue to monitor     Upon completion of the visit all questions were answered and the patients follow-up and testing schedule were reviewed.      Patient Active Problem List    Diagnosis Date Noted    cHTN (no meds) 2022     Priority: Medium     /91 on   139/93 on       History of recurrent miscarriages (x3), currently pregnant 2022     Priority: Medium     2020: SAB  : SAB  : SAB      Exposure to genital herpes (current partner) 2022     Priority: Medium    Chlamydia 2022     3/2022: Treated with Azithromycin  TOR- see labs      Trichomonas vaginalis (TV) infection (2022) 2022      Gonorrhea 2022      High-risk pregnancy 2022     Risk factors: Obesity (BMI 30.17), chlamydia in pregnancy, COVID vaccine series not completed, recurrent miscarriage (x3)      Obesity (BMI 30.17) affecting pregnancy in first trimester 2022     Early one-hour glucose tolerance test ordered per protocol      COVID-19 vaccine series not completed 2022     Pt counseled on the the risks of not getting vaccinated in pregnancy against COVID-19. Pregnant women with symptomatic covid have a 70% increased risk of death. Covid-19 during pregnancy  increases the risk for adverse outcomes, including  birth and admission of the baby to an intensive care unit. Return in about 4 weeks (around 2022) for PAUL Russell DO  Ob/Gyn Resident  [facility]  2022, 4:20 PM

## 2022-07-08 ENCOUNTER — ROUTINE PRENATAL (OUTPATIENT)
Dept: PERINATAL CARE | Age: 22
End: 2022-07-08
Payer: COMMERCIAL

## 2022-07-08 VITALS
HEART RATE: 96 BPM | RESPIRATION RATE: 16 BRPM | BODY MASS INDEX: 32.2 KG/M2 | WEIGHT: 175 LBS | HEIGHT: 62 IN | DIASTOLIC BLOOD PRESSURE: 88 MMHG | SYSTOLIC BLOOD PRESSURE: 134 MMHG | TEMPERATURE: 97.2 F

## 2022-07-08 DIAGNOSIS — O99.211 OBESITY AFFECTING PREGNANCY IN FIRST TRIMESTER: ICD-10-CM

## 2022-07-08 DIAGNOSIS — Z36.9 FIRST TRIMESTER SCREENING: Primary | ICD-10-CM

## 2022-07-08 DIAGNOSIS — Z3A.13 13 WEEKS GESTATION OF PREGNANCY: ICD-10-CM

## 2022-07-08 DIAGNOSIS — O36.80X0 ENCOUNTER TO DETERMINE FETAL VIABILITY OF PREGNANCY, SINGLE OR UNSPECIFIED FETUS: ICD-10-CM

## 2022-07-08 DIAGNOSIS — O16.1 HYPERTENSION AFFECTING PREGNANCY IN FIRST TRIMESTER: ICD-10-CM

## 2022-07-08 DIAGNOSIS — O26.20 RECURRENT PREGNANCY LOSS, ANTEPARTUM CONDITION OR COMPLICATION: ICD-10-CM

## 2022-07-08 LAB
CRL: NORMAL
SAC DIAMETER: NORMAL

## 2022-07-08 PROCEDURE — 76801 OB US < 14 WKS SINGLE FETUS: CPT | Performed by: OBSTETRICS & GYNECOLOGY

## 2022-07-08 PROCEDURE — 99999 PR OFFICE/OUTPT VISIT,PROCEDURE ONLY: CPT | Performed by: OBSTETRICS & GYNECOLOGY

## 2022-07-08 PROCEDURE — 76813 OB US NUCHAL MEAS 1 GEST: CPT | Performed by: OBSTETRICS & GYNECOLOGY

## 2022-07-08 NOTE — PROGRESS NOTES
I would advise continuation of daily oral baby aspirin 81 mg based on guidelines by the USPSTF/ACOG (for preeclampsia prevention for pregnant women at \"high-risk\"  for preeclampsia). Patient sent for laboratory evaluation for acquired/inherited thrombophilias (testing for antiphospholipid antibody syndrome, Factor V Leiden mutation, Prothrombin gene mutation, MTHFR 677/1298 mutation, etc) given personal obstetric history of recurrent pregnancy losses. Maternal karyotype on periperal blood also obtained (to evaluate for possible maternal chromosomal translocations). Patient has opted for the Five Gene Carrier Screen (with the [place] test from [address] [place] [address]) today.

## 2022-07-21 ENCOUNTER — HOSPITAL ENCOUNTER (EMERGENCY)
Age: 22
Discharge: LEFT BEFORE DISCHARGE | End: 2022-07-21

## 2022-07-21 VITALS
HEART RATE: 85 BPM | HEIGHT: 62 IN | SYSTOLIC BLOOD PRESSURE: 143 MMHG | BODY MASS INDEX: 31.47 KG/M2 | TEMPERATURE: 98.6 F | RESPIRATION RATE: 17 BRPM | WEIGHT: 171 LBS | DIASTOLIC BLOOD PRESSURE: 95 MMHG | OXYGEN SATURATION: 100 %

## 2022-07-21 ASSESSMENT — PAIN DESCRIPTION - LOCATION: LOCATION: ABDOMEN

## 2022-07-21 ASSESSMENT — PAIN - FUNCTIONAL ASSESSMENT: PAIN_FUNCTIONAL_ASSESSMENT: 0-10

## 2022-07-21 ASSESSMENT — PAIN SCALES - GENERAL: PAINLEVEL_OUTOF10: 10

## 2022-07-28 ENCOUNTER — ROUTINE PRENATAL (OUTPATIENT)
Dept: OBGYN | Age: 22
End: 2022-07-28
Payer: COMMERCIAL

## 2022-07-28 VITALS
BODY MASS INDEX: 31.09 KG/M2 | SYSTOLIC BLOOD PRESSURE: 131 MMHG | WEIGHT: 170 LBS | TEMPERATURE: 97 F | DIASTOLIC BLOOD PRESSURE: 87 MMHG | HEART RATE: 108 BPM

## 2022-07-28 DIAGNOSIS — Z20.2 EXPOSURE TO GENITAL HERPES: ICD-10-CM

## 2022-07-28 DIAGNOSIS — R11.2 NAUSEA AND VOMITING, INTRACTABILITY OF VOMITING NOT SPECIFIED, UNSPECIFIED VOMITING TYPE: Primary | ICD-10-CM

## 2022-07-28 DIAGNOSIS — Z3A.15 15 WEEKS GESTATION OF PREGNANCY: ICD-10-CM

## 2022-07-28 PROCEDURE — G8427 DOCREV CUR MEDS BY ELIG CLIN: HCPCS | Performed by: STUDENT IN AN ORGANIZED HEALTH CARE EDUCATION/TRAINING PROGRAM

## 2022-07-28 PROCEDURE — 1036F TOBACCO NON-USER: CPT | Performed by: STUDENT IN AN ORGANIZED HEALTH CARE EDUCATION/TRAINING PROGRAM

## 2022-07-28 PROCEDURE — 99213 OFFICE O/P EST LOW 20 MIN: CPT | Performed by: STUDENT IN AN ORGANIZED HEALTH CARE EDUCATION/TRAINING PROGRAM

## 2022-07-28 PROCEDURE — G8417 CALC BMI ABV UP PARAM F/U: HCPCS | Performed by: STUDENT IN AN ORGANIZED HEALTH CARE EDUCATION/TRAINING PROGRAM

## 2022-07-28 PROCEDURE — 99211 OFF/OP EST MAY X REQ PHY/QHP: CPT | Performed by: STUDENT IN AN ORGANIZED HEALTH CARE EDUCATION/TRAINING PROGRAM

## 2022-07-28 RX ORDER — ONDANSETRON 4 MG/1
4 TABLET, FILM COATED ORAL DAILY PRN
Qty: 30 TABLET | Refills: 3 | Status: SHIPPED | OUTPATIENT
Start: 2022-07-28

## 2022-07-28 NOTE — PROGRESS NOTES
Prenatal Visit    Rhea Waldrop is a 23 y.o. female  at 15w6d    Subjective: The patient was seen and evaluated. She complains of persistent nausea throughout her pregnancy. She denies any sick contacts, myalgias, fevers, chills, headache, or diarrhea. She states she vomits approximately 4-5 times a day. She admits to having a good appetite and is able to eat and drink but states 30 minutes to an hour later she vomits. She has intermittently tried vitamin B6 with minimal relief. She denies use of any other pharmaceutical methods. She reports Positive fetal movements. She denies contractions, vaginal bleeding and leakage of fluid. Signs and symptoms of  labor as well as labor were reviewed. Dates were reviewed with the patient. Estimated Date of Delivery: 23. The patient declined the COVID-19 vaccine this year. The problem list reflects the active issues addressed during today's visit    VITALS:  BP: 131/87  Weight: 170 lb (77.1 kg)  Heart Rate: (!) 108  Fetal HR: 140   Temp: 97F      Assessment & Plan:  Rhea Waldrop is a 23 y.o. female  at 14w8d   - Initial prenatal labs have not been completed.  Lab slips pritned   - An 18-22 week anatomy ultrasound has been ordered   - NT Screen: Normal   -AFP ordered   - COVID-19 vaccination: R/B/A discussed with increased risk of both maternal and fetal morbidity and mortality in unvaccinated pregnant patients who contract COVID-19- patient declined today   - Aspirin indication: indicated due to High risk factors: none, Moderate risk factors: nulliparity and BMI >30- Rx given, cHTN (no meds)   -We will send doxylamine and Zofran to pharmacy for persistent nausea    cHTN ( no meds)   - denies s/s of PreE   - Baseline preE labs ordered   - BP is normotensive    Patient Active Problem List    Diagnosis Date Noted    History of recurrent miscarriages (x3), currently pregnant 2022     Priority: Medium     2020: SAB  : SAB  : SAB      Exposure to genital herpes (current partner) 2022     Priority: Medium    cHTN (no meds) 2022     /91 on   139/93 on       Chlamydia (TOR neg) 2022     3/2022: Treated with Azithromycin  TOR- see labs      Trichomonas vaginalis (TV) infection (2022) 2022      Gonorrhea 2022      High-risk pregnancy 2022     Risk factors: Obesity (BMI 30.17), chlamydia in pregnancy, COVID vaccine series not completed, recurrent miscarriage (x3)      Obesity (BMI 30.17) affecting pregnancy in first trimester 2022     Early one-hour glucose tolerance test ordered per protocol      COVID-19 vaccine series not completed 2022     Pt counseled on the the risks of not getting vaccinated in pregnancy against COVID-19. Pregnant women with symptomatic covid have a 70% increased risk of death. Covid-19 during pregnancy  increases the risk for adverse outcomes, including  birth and admission of the baby to an intensive care unit. Return in about 4 weeks (around 2022) for PAUL Hernadez DO  Ob/Gyn Resident  [facility], Teressa [address]  2022, 4:19 PM

## 2022-08-29 ENCOUNTER — ROUTINE PRENATAL (OUTPATIENT)
Dept: PERINATAL CARE | Age: 22
End: 2022-08-29
Payer: COMMERCIAL

## 2022-08-29 VITALS
DIASTOLIC BLOOD PRESSURE: 82 MMHG | TEMPERATURE: 97.8 F | BODY MASS INDEX: 32.57 KG/M2 | HEART RATE: 96 BPM | SYSTOLIC BLOOD PRESSURE: 120 MMHG | RESPIRATION RATE: 16 BRPM | WEIGHT: 177 LBS | HEIGHT: 62 IN

## 2022-08-29 DIAGNOSIS — O16.2 HYPERTENSION AFFECTING PREGNANCY IN SECOND TRIMESTER: Primary | ICD-10-CM

## 2022-08-29 DIAGNOSIS — O99.212 OBESITY AFFECTING PREGNANCY IN SECOND TRIMESTER: ICD-10-CM

## 2022-08-29 DIAGNOSIS — Z3A.20 20 WEEKS GESTATION OF PREGNANCY: ICD-10-CM

## 2022-08-29 DIAGNOSIS — Z36.86 ENCOUNTER FOR SCREENING FOR RISK OF PRE-TERM LABOR: ICD-10-CM

## 2022-08-29 DIAGNOSIS — O44.40 LOW IMPLANTATION OF PLACENTA WITHOUT HEMORRHAGE: ICD-10-CM

## 2022-08-29 DIAGNOSIS — O26.20 RECURRENT PREGNANCY LOSS, ANTEPARTUM CONDITION OR COMPLICATION: ICD-10-CM

## 2022-08-29 LAB
ABDOMINAL CIRCUMFERENCE: NORMAL
ABDOMINAL CIRCUMFERENCE: NORMAL
BIPARIETAL DIAMETER: NORMAL
BIPARIETAL DIAMETER: NORMAL
ESTIMATED FETAL WEIGHT: NORMAL
ESTIMATED FETAL WEIGHT: NORMAL
FEMORAL DIAMETER: NORMAL
FEMORAL DIAMETER: NORMAL
HC/AC: NORMAL
HC/AC: NORMAL
HEAD CIRCUMFERENCE: NORMAL
HEAD CIRCUMFERENCE: NORMAL

## 2022-08-29 PROCEDURE — 76817 TRANSVAGINAL US OBSTETRIC: CPT | Performed by: OBSTETRICS & GYNECOLOGY

## 2022-08-29 PROCEDURE — G8417 CALC BMI ABV UP PARAM F/U: HCPCS | Performed by: OBSTETRICS & GYNECOLOGY

## 2022-08-29 PROCEDURE — 76811 OB US DETAILED SNGL FETUS: CPT | Performed by: OBSTETRICS & GYNECOLOGY

## 2022-08-29 PROCEDURE — 99242 OFF/OP CONSLTJ NEW/EST SF 20: CPT | Performed by: OBSTETRICS & GYNECOLOGY

## 2022-08-29 PROCEDURE — G8427 DOCREV CUR MEDS BY ELIG CLIN: HCPCS | Performed by: OBSTETRICS & GYNECOLOGY

## 2022-09-14 ENCOUNTER — TELEPHONE (OUTPATIENT)
Dept: OBGYN | Age: 22
End: 2022-09-14

## 2022-09-21 ENCOUNTER — APPOINTMENT (OUTPATIENT)
Dept: GENERAL RADIOLOGY | Age: 22
DRG: 566 | End: 2022-09-21
Payer: COMMERCIAL

## 2022-09-21 ENCOUNTER — APPOINTMENT (OUTPATIENT)
Dept: CT IMAGING | Age: 22
DRG: 566 | End: 2022-09-21
Payer: COMMERCIAL

## 2022-09-21 ENCOUNTER — HOSPITAL ENCOUNTER (INPATIENT)
Age: 22
LOS: 1 days | Discharge: LEFT BEFORE DISCHARGE | DRG: 566 | End: 2022-09-22
Attending: EMERGENCY MEDICINE | Admitting: INTERNAL MEDICINE
Payer: COMMERCIAL

## 2022-09-21 DIAGNOSIS — U07.1 COVID-19: Primary | ICD-10-CM

## 2022-09-21 PROBLEM — R00.0 TACHYCARDIA: Status: ACTIVE | Noted: 2022-09-21

## 2022-09-21 PROBLEM — I95.9 HYPOTENSION: Status: ACTIVE | Noted: 2022-09-21

## 2022-09-21 LAB
ABO/RH: NORMAL
ABSOLUTE EOS #: 0.09 K/UL (ref 0–0.4)
ABSOLUTE EOS #: NORMAL K/UL
ABSOLUTE IMMATURE GRANULOCYTE: 0 K/UL (ref 0–0.3)
ABSOLUTE IMMATURE GRANULOCYTE: NORMAL K/UL (ref 0–0.3)
ABSOLUTE LYMPH #: 0.17 K/UL (ref 1.2–5.2)
ABSOLUTE LYMPH #: NORMAL K/UL
ABSOLUTE MONO #: 0.34 K/UL (ref 0.1–1.4)
ABSOLUTE MONO #: NORMAL K/UL
ALBUMIN SERPL-MCNC: 3.7 G/DL (ref 3.5–5.2)
ALBUMIN/GLOBULIN RATIO: 1.1 (ref 1–2.5)
ALP BLD-CCNC: 87 U/L (ref 35–104)
ALT SERPL-CCNC: 12 U/L (ref 5–33)
AMPHETAMINE SCREEN URINE: NEGATIVE
ANION GAP SERPL CALCULATED.3IONS-SCNC: 14 MMOL/L (ref 9–17)
ANTIBODY SCREEN: NEGATIVE
ARM BAND NUMBER: NORMAL
AST SERPL-CCNC: 14 U/L
BARBITURATE SCREEN URINE: NEGATIVE
BASOPHILS # BLD: 0 % (ref 0–2)
BASOPHILS # BLD: NORMAL %
BASOPHILS ABSOLUTE: 0 K/UL (ref 0–0.2)
BASOPHILS ABSOLUTE: NORMAL K/UL (ref 0–0.2)
BENZODIAZEPINE SCREEN, URINE: NEGATIVE
BILIRUB SERPL-MCNC: <0.1 MG/DL (ref 0.3–1.2)
BILIRUBIN URINE: NEGATIVE
BUN BLDV-MCNC: 7 MG/DL (ref 6–20)
CALCIUM SERPL-MCNC: 9.2 MG/DL (ref 8.6–10.4)
CANNABINOID SCREEN URINE: POSITIVE
CARBOXYHEMOGLOBIN: 2.8 % (ref 0–5)
CASTS UA: ABNORMAL /LPF (ref 0–8)
CHLORIDE BLD-SCNC: 101 MMOL/L (ref 98–107)
CO2: 18 MMOL/L (ref 20–31)
COCAINE METABOLITE, URINE: NEGATIVE
COLOR: YELLOW
CREAT SERPL-MCNC: 0.51 MG/DL (ref 0.5–0.9)
CREATININE URINE: 90.5 MG/DL (ref 28–217)
D-DIMER QUANTITATIVE: 0.83 MG/L FEU
DIFFERENTIAL TYPE: NORMAL
EOSINOPHILS RELATIVE PERCENT: 1 % (ref 1–4)
EOSINOPHILS RELATIVE PERCENT: NORMAL %
EPITHELIAL CELLS UA: ABNORMAL /HPF (ref 0–5)
EXPIRATION DATE: NORMAL
FENTANYL URINE: NEGATIVE
FIO2: ABNORMAL
FLU A ANTIGEN: NEGATIVE
FLU B ANTIGEN: NEGATIVE
GFR AFRICAN AMERICAN: >60 ML/MIN
GFR NON-AFRICAN AMERICAN: >60 ML/MIN
GFR SERPL CREATININE-BSD FRML MDRD: ABNORMAL ML/MIN/{1.73_M2}
GLUCOSE BLD-MCNC: 116 MG/DL (ref 70–99)
GLUCOSE URINE: NEGATIVE
HCO3 VENOUS: 16.9 MMOL/L (ref 24–30)
HCT VFR BLD CALC: 30 % (ref 36.3–47.1)
HCT VFR BLD CALC: NORMAL %
HEMOGLOBIN: 10.4 G/DL (ref 11.9–15.1)
HEMOGLOBIN: NORMAL G/DL
HEPATITIS B SURFACE ANTIGEN: NORMAL
HIV AG/AB: NONREACTIVE
IMMATURE GRANULOCYTES: 0 %
IMMATURE GRANULOCYTES: NORMAL %
KETONES, URINE: ABNORMAL
LACTATE DEHYDROGENASE: 113 U/L (ref 135–214)
LACTIC ACID, WHOLE BLOOD: 1.7 MMOL/L (ref 0.7–2.1)
LEUKOCYTE ESTERASE, URINE: NEGATIVE
LYMPHOCYTES # BLD: 2 % (ref 25–45)
LYMPHOCYTES # BLD: NORMAL %
MCH RBC QN AUTO: 28.3 PG (ref 25.2–33.5)
MCH RBC QN AUTO: NORMAL PG
MCHC RBC AUTO-ENTMCNC: 34.7 G/DL (ref 28.4–34.8)
MCHC RBC AUTO-ENTMCNC: NORMAL G/DL
MCV RBC AUTO: 81.5 FL (ref 82.6–102.9)
MCV RBC AUTO: NORMAL FL
METHADONE SCREEN, URINE: NEGATIVE
MONOCYTES # BLD: 4 % (ref 2–8)
MONOCYTES # BLD: NORMAL %
MORPHOLOGY: ABNORMAL
NEGATIVE BASE EXCESS, VEN: 5.4 MMOL/L (ref 0–2)
NITRITE, URINE: NEGATIVE
NRBC AUTOMATED: 0 PER 100 WBC
NRBC AUTOMATED: NORMAL PER 100 WBC
O2 SAT, VEN: 99.3 % (ref 60–85)
OPIATES, URINE: NEGATIVE
OXYCODONE SCREEN URINE: NEGATIVE
PATIENT TEMP: 37
PCO2, VEN: 24.4 MM HG (ref 39–55)
PDW BLD-RTO: 13.4 % (ref 11.8–14.4)
PDW BLD-RTO: NORMAL %
PH UA: 6 (ref 5–8)
PH VENOUS: 7.46 (ref 7.32–7.42)
PHENCYCLIDINE, URINE: NEGATIVE
PLATELET # BLD: 157 K/UL (ref 138–453)
PLATELET # BLD: NORMAL K/UL
PLATELET ESTIMATE: NORMAL
PMV BLD AUTO: 10.8 FL (ref 8.1–13.5)
PMV BLD AUTO: NORMAL FL
PO2, VEN: 158 MM HG (ref 30–50)
POTASSIUM SERPL-SCNC: 3.7 MMOL/L (ref 3.7–5.3)
PRO-BNP: 58 PG/ML
PROCALCITONIN: 0.06 NG/ML
PROTEIN UA: NEGATIVE
RBC # BLD: 3.68 M/UL (ref 3.95–5.11)
RBC # BLD: NORMAL 10*6/UL
RBC # BLD: NORMAL M/UL
RBC UA: ABNORMAL /HPF (ref 0–4)
RUBV IGG SER QL: 119.3 IU/ML
RUBV IGG SER QL: NORMAL IU/ML
SARS-COV-2, RAPID: DETECTED
SEG NEUTROPHILS: 93 % (ref 34–64)
SEG NEUTROPHILS: NORMAL %
SEGMENTED NEUTROPHILS ABSOLUTE COUNT: 8 K/UL (ref 1.8–8)
SEGMENTED NEUTROPHILS ABSOLUTE COUNT: NORMAL K/UL
SODIUM BLD-SCNC: 133 MMOL/L (ref 135–144)
SPECIFIC GRAVITY UA: 1.05 (ref 1–1.03)
SPECIMEN DESCRIPTION: ABNORMAL
T. PALLIDUM, IGG: NONREACTIVE
T. PALLIDUM, IGG: NORMAL
TEST INFORMATION: ABNORMAL
TOTAL PROTEIN, URINE: 13 MG/DL
TOTAL PROTEIN: 7.1 G/DL (ref 6.4–8.3)
TROPONIN, HIGH SENSITIVITY: <6 NG/L (ref 0–14)
TSH SERPL DL<=0.05 MIU/L-ACNC: 0.89 UIU/ML (ref 0.3–5)
TURBIDITY: CLEAR
URIC ACID: 2.6 MG/DL (ref 2.4–5.7)
URINE HGB: NEGATIVE
URINE TOTAL PROTEIN CREATININE RATIO: 0.14 (ref 0–0.2)
UROBILINOGEN, URINE: NORMAL
WBC # BLD: 8.6 K/UL (ref 4.5–13.5)
WBC # BLD: NORMAL 10*3/UL
WBC # BLD: NORMAL K/UL
WBC UA: ABNORMAL /HPF (ref 0–5)

## 2022-09-21 PROCEDURE — 86901 BLOOD TYPING SEROLOGIC RH(D): CPT

## 2022-09-21 PROCEDURE — 2580000003 HC RX 258

## 2022-09-21 PROCEDURE — 96360 HYDRATION IV INFUSION INIT: CPT

## 2022-09-21 PROCEDURE — 82570 ASSAY OF URINE CREATININE: CPT

## 2022-09-21 PROCEDURE — 87086 URINE CULTURE/COLONY COUNT: CPT

## 2022-09-21 PROCEDURE — 96361 HYDRATE IV INFUSION ADD-ON: CPT

## 2022-09-21 PROCEDURE — 84484 ASSAY OF TROPONIN QUANT: CPT

## 2022-09-21 PROCEDURE — 86900 BLOOD TYPING SEROLOGIC ABO: CPT

## 2022-09-21 PROCEDURE — 2580000003 HC RX 258: Performed by: EMERGENCY MEDICINE

## 2022-09-21 PROCEDURE — 2500000003 HC RX 250 WO HCPCS

## 2022-09-21 PROCEDURE — 83605 ASSAY OF LACTIC ACID: CPT

## 2022-09-21 PROCEDURE — 6370000000 HC RX 637 (ALT 250 FOR IP)

## 2022-09-21 PROCEDURE — 84550 ASSAY OF BLOOD/URIC ACID: CPT

## 2022-09-21 PROCEDURE — 71045 X-RAY EXAM CHEST 1 VIEW: CPT

## 2022-09-21 PROCEDURE — 6370000000 HC RX 637 (ALT 250 FOR IP): Performed by: EMERGENCY MEDICINE

## 2022-09-21 PROCEDURE — 93005 ELECTROCARDIOGRAM TRACING: CPT

## 2022-09-21 PROCEDURE — 99285 EMERGENCY DEPT VISIT HI MDM: CPT

## 2022-09-21 PROCEDURE — 71260 CT THORAX DX C+: CPT | Performed by: EMERGENCY MEDICINE

## 2022-09-21 PROCEDURE — 87635 SARS-COV-2 COVID-19 AMP PRB: CPT

## 2022-09-21 PROCEDURE — 83880 ASSAY OF NATRIURETIC PEPTIDE: CPT

## 2022-09-21 PROCEDURE — 2060000000 HC ICU INTERMEDIATE R&B

## 2022-09-21 PROCEDURE — 87804 INFLUENZA ASSAY W/OPTIC: CPT

## 2022-09-21 PROCEDURE — 87389 HIV-1 AG W/HIV-1&-2 AB AG IA: CPT

## 2022-09-21 PROCEDURE — 36415 COLL VENOUS BLD VENIPUNCTURE: CPT

## 2022-09-21 PROCEDURE — 86850 RBC ANTIBODY SCREEN: CPT

## 2022-09-21 PROCEDURE — 82805 BLOOD GASES W/O2 SATURATION: CPT

## 2022-09-21 PROCEDURE — 80053 COMPREHEN METABOLIC PANEL: CPT

## 2022-09-21 PROCEDURE — 85379 FIBRIN DEGRADATION QUANT: CPT

## 2022-09-21 PROCEDURE — 86762 RUBELLA ANTIBODY: CPT

## 2022-09-21 PROCEDURE — 85025 COMPLETE CBC W/AUTO DIFF WBC: CPT

## 2022-09-21 PROCEDURE — 82306 VITAMIN D 25 HYDROXY: CPT

## 2022-09-21 PROCEDURE — 87340 HEPATITIS B SURFACE AG IA: CPT

## 2022-09-21 PROCEDURE — 83615 LACTATE (LD) (LDH) ENZYME: CPT

## 2022-09-21 PROCEDURE — 6360000004 HC RX CONTRAST MEDICATION: Performed by: EMERGENCY MEDICINE

## 2022-09-21 PROCEDURE — 81001 URINALYSIS AUTO W/SCOPE: CPT

## 2022-09-21 PROCEDURE — 86780 TREPONEMA PALLIDUM: CPT

## 2022-09-21 PROCEDURE — 99254 IP/OBS CNSLTJ NEW/EST MOD 60: CPT | Performed by: INTERNAL MEDICINE

## 2022-09-21 PROCEDURE — 84443 ASSAY THYROID STIM HORMONE: CPT

## 2022-09-21 PROCEDURE — 76937 US GUIDE VASCULAR ACCESS: CPT

## 2022-09-21 PROCEDURE — 83020 HEMOGLOBIN ELECTROPHORESIS: CPT

## 2022-09-21 PROCEDURE — 84156 ASSAY OF PROTEIN URINE: CPT

## 2022-09-21 PROCEDURE — 84145 PROCALCITONIN (PCT): CPT

## 2022-09-21 PROCEDURE — 86803 HEPATITIS C AB TEST: CPT

## 2022-09-21 PROCEDURE — 80307 DRUG TEST PRSMV CHEM ANLYZR: CPT

## 2022-09-21 RX ORDER — METOPROLOL TARTRATE 5 MG/5ML
5 INJECTION INTRAVENOUS ONCE
Status: COMPLETED | OUTPATIENT
Start: 2022-09-21 | End: 2022-09-21

## 2022-09-21 RX ORDER — LANOLIN ALCOHOL/MO/W.PET/CERES
25 CREAM (GRAM) TOPICAL 3 TIMES DAILY
Status: DISCONTINUED | OUTPATIENT
Start: 2022-09-21 | End: 2022-09-22 | Stop reason: HOSPADM

## 2022-09-21 RX ORDER — 0.9 % SODIUM CHLORIDE 0.9 %
1000 INTRAVENOUS SOLUTION INTRAVENOUS ONCE
Status: COMPLETED | OUTPATIENT
Start: 2022-09-21 | End: 2022-09-21

## 2022-09-21 RX ORDER — SODIUM CHLORIDE 9 MG/ML
INJECTION, SOLUTION INTRAVENOUS CONTINUOUS
Status: DISCONTINUED | OUTPATIENT
Start: 2022-09-21 | End: 2022-09-22 | Stop reason: HOSPADM

## 2022-09-21 RX ORDER — SODIUM CHLORIDE 0.9 % (FLUSH) 0.9 %
5-40 SYRINGE (ML) INJECTION PRN
Status: DISCONTINUED | OUTPATIENT
Start: 2022-09-21 | End: 2022-09-22 | Stop reason: HOSPADM

## 2022-09-21 RX ORDER — ENOXAPARIN SODIUM 100 MG/ML
40 INJECTION SUBCUTANEOUS DAILY
Status: DISCONTINUED | OUTPATIENT
Start: 2022-09-21 | End: 2022-09-22 | Stop reason: HOSPADM

## 2022-09-21 RX ORDER — POLYETHYLENE GLYCOL 3350 17 G/17G
17 POWDER, FOR SOLUTION ORAL DAILY PRN
Status: DISCONTINUED | OUTPATIENT
Start: 2022-09-21 | End: 2022-09-22 | Stop reason: HOSPADM

## 2022-09-21 RX ORDER — ACETAMINOPHEN 500 MG
1000 TABLET ORAL ONCE
Status: COMPLETED | OUTPATIENT
Start: 2022-09-21 | End: 2022-09-21

## 2022-09-21 RX ORDER — VITAMIN A, ASCORBIC ACID, CHOLECALCIFEROL, .ALPHA.-TOCOPHEROL ACETATE, DL-, THIAMINE MONONITRATE, RIBOFLAVIN, NIACINAMIDE, PYRIDOXINE HYDROCHLORIDE, FOLIC ACID, CYANOCOBALAMIN, CALCIUM CARBONATE, IRON, ZINC OXIDE, AND CUPRIC OXIDE 4000; 120; 400; 22; 1.84; 3; 20; 10; 1; 12; 200; 29; 25; 2 [IU]/1; MG/1; [IU]/1; [IU]/1; MG/1; MG/1; MG/1; MG/1; MG/1; UG/1; MG/1; MG/1; MG/1; MG/1
1 TABLET ORAL DAILY
Status: DISCONTINUED | OUTPATIENT
Start: 2022-09-21 | End: 2022-09-22 | Stop reason: HOSPADM

## 2022-09-21 RX ORDER — ONDANSETRON 4 MG/1
4 TABLET, ORALLY DISINTEGRATING ORAL EVERY 8 HOURS PRN
Status: DISCONTINUED | OUTPATIENT
Start: 2022-09-21 | End: 2022-09-22 | Stop reason: HOSPADM

## 2022-09-21 RX ORDER — SODIUM CHLORIDE 0.9 % (FLUSH) 0.9 %
5-40 SYRINGE (ML) INJECTION EVERY 12 HOURS SCHEDULED
Status: DISCONTINUED | OUTPATIENT
Start: 2022-09-21 | End: 2022-09-22 | Stop reason: HOSPADM

## 2022-09-21 RX ORDER — SODIUM CHLORIDE 9 MG/ML
INJECTION, SOLUTION INTRAVENOUS PRN
Status: DISCONTINUED | OUTPATIENT
Start: 2022-09-21 | End: 2022-09-22 | Stop reason: HOSPADM

## 2022-09-21 RX ORDER — ONDANSETRON 2 MG/ML
4 INJECTION INTRAMUSCULAR; INTRAVENOUS EVERY 6 HOURS PRN
Status: DISCONTINUED | OUTPATIENT
Start: 2022-09-21 | End: 2022-09-22 | Stop reason: HOSPADM

## 2022-09-21 RX ORDER — ACETAMINOPHEN 650 MG/1
650 SUPPOSITORY RECTAL EVERY 6 HOURS PRN
Status: DISCONTINUED | OUTPATIENT
Start: 2022-09-21 | End: 2022-09-22 | Stop reason: HOSPADM

## 2022-09-21 RX ORDER — ACETAMINOPHEN 325 MG/1
650 TABLET ORAL EVERY 6 HOURS PRN
Status: DISCONTINUED | OUTPATIENT
Start: 2022-09-21 | End: 2022-09-22 | Stop reason: HOSPADM

## 2022-09-21 RX ADMIN — METOPROLOL TARTRATE 5 MG: 5 INJECTION, SOLUTION INTRAVENOUS at 18:57

## 2022-09-21 RX ADMIN — Medication 25 MG: at 21:29

## 2022-09-21 RX ADMIN — SODIUM CHLORIDE 1000 ML: 9 INJECTION, SOLUTION INTRAVENOUS at 11:01

## 2022-09-21 RX ADMIN — Medication 1 TABLET: at 21:29

## 2022-09-21 RX ADMIN — ACETAMINOPHEN 650 MG: 325 TABLET ORAL at 21:30

## 2022-09-21 RX ADMIN — ACETAMINOPHEN 1000 MG: 500 TABLET ORAL at 09:27

## 2022-09-21 RX ADMIN — SODIUM CHLORIDE 1000 ML: 9 INJECTION, SOLUTION INTRAVENOUS at 09:28

## 2022-09-21 RX ADMIN — SODIUM CHLORIDE, PRESERVATIVE FREE 10 ML: 5 INJECTION INTRAVENOUS at 21:29

## 2022-09-21 RX ADMIN — IOPAMIDOL 75 ML: 755 INJECTION, SOLUTION INTRAVENOUS at 10:27

## 2022-09-21 RX ADMIN — SODIUM CHLORIDE: 9 INJECTION, SOLUTION INTRAVENOUS at 13:58

## 2022-09-21 ASSESSMENT — ENCOUNTER SYMPTOMS
VOMITING: 0
RHINORRHEA: 0
ABDOMINAL PAIN: 0
BACK PAIN: 0
SHORTNESS OF BREATH: 1
SORE THROAT: 0
NAUSEA: 0

## 2022-09-21 ASSESSMENT — PAIN DESCRIPTION - LOCATION: LOCATION: HEAD

## 2022-09-21 ASSESSMENT — PAIN SCALES - GENERAL: PAINLEVEL_OUTOF10: 10

## 2022-09-21 NOTE — PLAN OF CARE
Problem: Discharge Planning  Goal: Discharge to home or other facility with appropriate resources  Outcome: Progressing  Flowsheets (Taken 9/21/2022 1802)  Discharge to home or other facility with appropriate resources:   Identify barriers to discharge with patient and caregiver   Identify discharge learning needs (meds, wound care, etc)   Refer to discharge planning if patient needs post-hospital services based on physician order or complex needs related to functional status, cognitive ability or social support system   Arrange for needed discharge resources and transportation as appropriate   Arrange for interpreters to assist at discharge as needed     Problem: Safety - Adult  Goal: Free from fall injury  Outcome: Progressing     Problem: ABCDS Injury Assessment  Goal: Absence of physical injury  Outcome: Progressing

## 2022-09-21 NOTE — ED NOTES
[facility] IV team at bedside     Vee Huddleston, [address] [place] [address]  09/21/22 [id number]

## 2022-09-21 NOTE — CONSULTS
[facility] - Initial Consult Note COVID 19 Patient  Today's Date and Time: 9/21/2022, 3:38 PM    Impression :     COVID 19 Confirmed Infection  Covid tests:  9-21-22: Positive  Unvaccinated individual  Pregnancy 23 weeks of gestation    Recommendations:   Antibiotic treatment:  Monitor off antibiotics  Covid Rx:    Remdesivir. Would be best option to decrease risk of Covid  Decadron: Not indicated  Actemra: Not indicated  Monoclonal antibodies: Not studied in pregnancy      Medical Decision Making/Summary/Discussion:9/21/2022     Patient admitted with COVID 19 infection    Infection Control Recommendations   [place] Precautions  Airborne isolation  Droplet Isolation  Isolate until 10-1-22    Antimicrobial Stewardship Recommendations     Discontinuation of therapy  Coordination of Outpatient Care:   Estimated Length of IV antimicrobials:TBD  Patient will need Midline Catheter Insertion: TBD  Patient will need PICC line Insertion: No  Patient will need: Home IV , [place],  SNF,  LTAC:TBD  Patient will need outpatient wound care:No    Chief complaint/reason for consultation:   Concern for COVID infection  Pregnancy      History of Present Illness:   Sammie Andres is a 23y.o.-year-old  female who was initially admitted on 9/21/2022. Patient seen at the request of Dr. Perico August. INITIAL HISTORY:    Patient presented through ER with complaints of onset of shortness of breath. The patient indicates that the symptoms started on 9/21/2022 with shortness of breath, headache, chest pain. She was tested for COVID 19 and her test came back positive on 9/21/2022. She indicates that she has received a single dose of the COVID-vaccine previously. The patient is into her 23rd week of pregnancy. She reports 3 prior pregnancies which ended up being miscarriages. She was evaluated by OB/GYN who indicated no fetal distress or ambulatory and threat to the pregnancy.      Her past history includes chronic hypertension, prior infections with chlamydia, trichomonas, GC and herpes. All others infections have been previously treated. In the ER the patient had some hypotension and tachycardia. Her oxygen saturation rate was normal.    Patient admitted because of concerns with COVID 19, shortness of breath, tachycardia and hypotension. .    CURRENT EVALUATION : 9/21/2022    Afebrile  VS stable but with tachycardia up to 129 and blood pressure in the hypotensive range    Patient exhibiting respiratory distress. no  Respiratory secretions: no    Patient receiving supplemental oxygen. no  RR 35  02 sat 100      NEWS Score: 0-4 Low risk group; 5-6: Medium risk group; 7 or above: High risk group  Parameters 3 2 1 0 1 2 3   Age    < 72   ? 65   RR ? 8  9-11 12-20  21-24 ? 25   O2 Sats ? 91 92-93 94-95 ? 96      Suppl O2  Yes  No      SBP ? 90  101-110 111-219   ? 220   HR ? 40  41-50 51-90  111-130 ? 131   Consciousness    Alert   Drowsiness, lethargy, or confusion   Temperature ? 35.0 C (95.0 F)  35.1-36.0 C 95.1-96.9 F 36.1-38.0 C 97.0-100.4 F 38.1-39.0 C 100.5-102.3 F ? 39.1 C ? 102.4 F      NEWS Score:  9/21/2022: 7 high risk for requiring ICU care    Overall Daily Picture: Worsening    Presence of secondary bacterial Infection:    No   Additional antibiotics: no    Labs, X rays reviewed: 9/21/2022    BUN: 7  Cr: 0.51    WBC: 8.6  Hb: 10.4  Plat: 157    Absolute Neutrophils: 8.0  Absolute Lymphocytes: 0.17  Neutrophil/Lymphocyte Ratio: 47 High risk    CRP:   Ferritin:  LDH:     Pro Calcitonin:      Cultures:  Urine:    Blood:    Sputum :    Wound:      CXR:   9-21-22: Normal.  CAT:  9-21-22: Normal. No PE    Discussed with patient, RN, IM. I have personally reviewed the past medical history, past surgical history, medications, social history, and family history, and I have updated the database accordingly.   Past Medical History:     Past Medical History:   Diagnosis Date    BV (bacterial vaginosis)     Chlamydia     3/22; ToR 5/22, 6/22    Chlamydia infection 07/2021    Chlamydia infection affecting pregnancy 3/2022, 7/2021    ToR 5/2022, 6/2022    cHTN (no meds) 6/30/2022    Closed fracture of phalanx of middle finger 2016    Gonorrhea 07/2021    History of blood transfusion     Transfused after SAB 2021    Seasonal allergies     Strep throat     Trichomonas infection 02/2022    Trichomonas vaginitis 02/2021       Past Surgical  History:   No past surgical history on file.     Medications:       Social History:     Social History     Socioeconomic History    Marital status: Single     Spouse name: Not on file    Number of children: Not on file    Years of education: Not on file    Highest education level: Not on file   Occupational History    Not on file   Tobacco Use    Smoking status: Never     Passive exposure: Yes    Smokeless tobacco: Never   Vaping Use    Vaping Use: Never used    Passive vaping exposure: Yes   Substance and Sexual Activity    Alcohol use: Not Currently     Comment: Not during pregnancy (last drink summer 2021)    Drug use: Never    Sexual activity: Yes     Partners: Male   Other Topics Concern    Not on file   Social History Narrative    Not on file     Social Determinants of Health     Financial Resource Strain: Medium Risk    Difficulty of Paying Living Expenses: Somewhat hard   Food Insecurity: Food Insecurity Present    Worried About Running Out of Food in the Last Year: Sometimes true    Ran Out of Food in the Last Year: Sometimes true   Transportation Needs: Not on file   Physical Activity: Not on file   Stress: Not on file   Social Connections: Not on file   Intimate Partner Violence: Not on file   Housing Stability: Not on file       Family History:     Family History   Problem Relation Age of Onset    Pancreatic Cancer Father     Arthritis Maternal Grandmother     Heart Attack Maternal Grandmother     Stroke Maternal Grandfather     Diabetes Maternal Grandfather     No Known Problems Paternal Grandfather     No Known Problems Paternal Grandmother     No Known Problems Mother     No Known Problems Brother     No Known Problems Sister         Allergies:   Patient has no known allergies. Review of Systems:       Constitutional: No fevers or chills. No systemic complaints  Head:  Headaches  Eyes: No double vision or blurry vision. No conjunctival inflammation. ENT: No sore throat or runny nose. . No hearing loss, tinnitus or vertigo. Cardiovascular: No chest pain or palpitations. No Shortness of breath. No PAT  Lung: Shortness of breath, no cough. No sputum production  Abdomen: No nausea, vomiting, diarrhea, or abdominal pain. [place] No cramps. Genitourinary: No increased urinary frequency, or dysuria. No hematuria. No suprapubic or CVA pain  Musculoskeletal: No muscle aches or pains. No joint effusions, swelling or deformities  Hematologic: No bleeding or bruising. Neurologic: No headache, weakness, numbness, or tingling. Integument: No rash, no ulcers. Psychiatric: No depression. Endocrine: No polyuria, no polydipsia, no polyphagia.     Physical Examination :   Patient Vitals for the past 8 hrs:   BP Temp Temp src Pulse Resp SpO2   09/21/22 1447 (!) 93/49 -- -- (!) 131 29 100 %   09/21/22 1432 117/81 -- -- (!) 134 18 98 %   09/21/22 1416 (!) 109/55 -- -- (!) 139 21 100 %   09/21/22 1407 (!) 99/55 -- -- (!) 130 16 100 %   09/21/22 1316 (!) 107/57 -- -- (!) 131 27 100 %   09/21/22 1301 (!) 104/42 -- -- (!) 128 28 100 %   09/21/22 1246 (!) 101/40 -- -- (!) 132 20 100 %   09/21/22 1231 (!) 106/58 -- -- (!) 125 20 100 %   09/21/22 1216 (!) 97/41 -- -- (!) 120 26 98 %   09/21/22 1202 (!) 96/38 -- -- (!) 113 28 98 %   09/21/22 1120 (!) 115/49 -- -- (!) 114 27 99 %   09/21/22 1049 117/62 -- -- (!) 122 (!) 32 98 %   09/21/22 1015 -- -- -- (!) 127 -- --   09/21/22 1002 106/77 -- -- (!) 127 16 100 %   09/21/22 0947 (!) 100/53 -- -- (!) 118 23 100 %   09/21/22 0935 127/61 -- -- (!) 112 19 95 %   09/21/22 0836 124/66 98.1 °F (36.7 °C) Oral (!) 140 20 100 %     General Appearance: Awake, alert, and in no apparent distress  Head:  Normocephalic, no trauma  Eyes: Pupils equal, round, reactive to light; sclera anicteric; conjunctivae pink. No embolic phenomena. ENT: Oropharynx clear, without erythema, exudate, or thrush. No tenderness of sinuses. Mouth/throat: mucosa pink and moist. No lesions. Dentition in good repair. Neck:Supple, without lymphadenopathy. Thyroid normal, No bruits. Pulmonary/Chest: Clear to auscultation, without wheezes, rales, or rhonchi. No dullness to percussion. Cardiovascular: Regular rate and rhythm without murmurs, rubs, or gallops. Abdomen: Soft, non tender. Bowel sounds normal. No organomegaly. Mild distension from pregnancy  All four Extremities: No cyanosis, clubbing, edema, or effusions. Neurologic: No gross sensory or motor deficits. Skin: Warm and dry with good turgor. No signs of peripheral arterial or venous insufficiency. No ulcerations. No open wounds. Medical Decision Making -Laboratory:   I have independently reviewed/ordered the following labs:    CBC with Differential:   Recent Labs     09/21/22  0910 09/21/22  4360   WBC 8.6 DUPLICATE ORDER   HGB 30.9* DUPLICATE ORDER   HCT 27.3* DUPLICATE ORDER    DUPLICATE ORDER   LYMPHOPCT 2* DUPLICATE ORDER   MONOPCT 4 DUPLICATE ORDER     BMP:   Recent Labs     09/21/22  0910   *   K 3.7      CO2 18*   BUN 7   CREATININE 0.51     Hepatic Function Panel:   Recent Labs     09/21/22  0910   PROT 7.1   LABALBU 3.7   BILITOT <0.1*   ALKPHOS 87   ALT 12   AST 14     No results for input(s): RPR in the last 72 hours. No results for input(s): HIV in the last 72 hours. No results for input(s): BC in the last 72 hours.   Lab Results   Component Value Date/Time    MUCUS 2+ 06/01/2022 94:33 PM    RBC DUPLICATE ORDER 45/62/4216 11:00 AM    TRICHOMONAS FEW 07/13/2021 85:91 PM    WBC DUPLICATE ORDER 09/21/2022 11:00 AM    YEAST NOT REPORTED 07/13/2021 03:30 PM    TURBIDITY Cloudy 06/01/2022 08:09 PM     Lab Results   Component Value Date/Time    CREATININE 0.51 09/21/2022 09:10 AM    GLUCOSE 116 09/21/2022 09:10 AM       Medical Decision Making-Imaging:     EXAMINATION:   CTA OF THE CHEST 9/21/2022 10:24 am       TECHNIQUE:   CTA of the chest was performed after the administration of intravenous   contrast.  Multiplanar reformatted images are provided for review. MIP   images are provided for review. Automated exposure control, iterative   reconstruction, and/or weight based adjustment of the mA/kV was utilized to   reduce the radiation dose to as low as reasonably achievable. COMPARISON:   None. HISTORY:   ORDERING SYSTEM PROVIDED HISTORY: COVID+, pregnant, +ddimer, tachycardia   TECHNOLOGIST PROVIDED HISTORY:   COVID+, pregnant, +ddimer, tachycardia   Decision Support Exception - unselect if not a suspected or confirmed   emergency medical condition->Emergency Medical Condition (MA)       FINDINGS:   Pulmonary Arteries: Pulmonary arteries are adequately opacified for   evaluation. No evidence of intraluminal filling defect to suggest pulmonary   embolism. Main pulmonary artery is normal in caliber. Mediastinum: No evidence of mediastinal lymphadenopathy. The heart and   pericardium demonstrate no acute abnormality. There is no acute abnormality   of the thoracic aorta. Lungs/pleura: The lungs are without acute process. No focal consolidation or   pulmonary edema. No evidence of pleural effusion or pneumothorax. Upper Abdomen: Limited images of the upper abdomen are unremarkable. Soft Tissues/Bones: No acute bone or soft tissue abnormality. Impression   1. No evidence of pulmonary embolism or acute pulmonary abnormality.            Medical Decision [id number]:       Medical Decision Making-Other:     Note:  Labs, medications, radiologic

## 2022-09-21 NOTE — PROGRESS NOTES
Concerns for patients vitals prior to report being called (low BP and high HR), consulted with clin lead Fady Holliday and  Priyanka Ramirez who are in agreement patient should have IV access since her vitals are unstable. Patient is appropriate for floor once line is in place for proper treatment.

## 2022-09-21 NOTE — ED PROVIDER NOTES
[facility]  Emergency Department Encounter     Pt Name: Angie Fuller  MRN: [id number]  [place] 2000  Date of evaluation: 9/21/22  PCP:  JAM Freedman CNP    CHIEF COMPLAINT       Chief Complaint   Patient presents with    Shortness of Breath    Chest Pain    Fever       HISTORY OF PRESENT ILLNESS  (Location/Symptom, Timing/Onset, Context/Setting, Quality, Duration, Modifying Factors, Severity.)    Angie Fuller is a 23 y.o. female who presents with chest pain, shortness of breath, headache, subjective fever. Patient is currently 23 weeks pregnant. This is her fourth pregnancy, however her past 3 have been spontaneous abortions. She states that she started not feeling well last night. No known exposure to COVID-19 or influenza that she is aware of. She has not had any complications during this pregnancy thus far. No pre-existing cardiac or pulmonary history that she is aware of. Has not had any abdominal pain, gush of vaginal fluid, vaginal bleeding. No nausea vomiting or diarrhea. No congestion sore throat or ear pain. PAST MEDICAL / SURGICAL / SOCIAL / FAMILY HISTORY    has a past medical history of BV (bacterial vaginosis), Chlamydia, Chlamydia infection, Chlamydia infection affecting pregnancy, cHTN (no meds), Closed fracture of phalanx of middle finger, Gonorrhea, History of blood transfusion, Seasonal allergies, Strep throat, Trichomonas infection, and Trichomonas vaginitis. has no past surgical history on file.     Social History     Socioeconomic History    Marital status: Single     Spouse name: Not on file    Number of children: Not on file    Years of education: Not on file    Highest education level: Not on file   Occupational History    Not on file   Tobacco Use    Smoking status: Never     Passive exposure: Yes    Smokeless tobacco: Never   Vaping Use    Vaping Use: Never used    Passive vaping exposure: Yes   Substance and Sexual Activity    Alcohol use: Not Currently     Comment: Not during pregnancy (last drink summer 2021)    Drug use: Never    Sexual activity: Yes     Partners: Male   Other Topics Concern    Not on file   Social History Narrative    Not on file     Social Determinants of Health     Financial Resource Strain: Medium Risk    Difficulty of Paying Living Expenses: Somewhat hard   Food Insecurity: Food Insecurity Present    Worried About Running Out of Food in the Last Year: Sometimes true    Ran Out of Food in the Last Year: Sometimes true   Transportation Needs: Not on file   Physical Activity: Not on file   Stress: Not on file   Social Connections: Not on file   Intimate Partner Violence: Not on file   Housing Stability: Not on file       Family History   Problem Relation Age of Onset    Pancreatic Cancer Father     Arthritis Maternal Grandmother     Heart Attack Maternal Grandmother     Stroke Maternal Grandfather     Diabetes Maternal Grandfather     No Known Problems Paternal Grandfather     No Known Problems Paternal Grandmother     No Known Problems Mother     No Known Problems Brother     No Known Problems Sister        Allergies:    Patient has no known allergies. Home Medications:  Prior to Admission medications    Medication Sig Start Date End Date Taking? Authorizing Provider   nirmatrelvir/ritonavir (PAXLOVID) 20 x 150 MG & 10 x 100MG TBPK Take 3 tablets (two 150 mg nirmatrelvir and one 100 mg ritonavir tablets) by mouth every 12 hours for 5 days.  9/21/22 9/26/22 Yes Kareen Suárez, DO   ondansetron (ZOFRAN) 4 MG tablet Take 1 tablet by mouth daily as needed for Nausea or Vomiting  Patient not taking: Reported on 8/29/2022 7/28/22   Caleb Pelletier, DO   aspirin EC 81 MG EC tablet Take 1 tablet by mouth daily  Patient not taking: Reported on 9/21/2022 6/30/22   Hayde Powell, DO   vitamin B-6 (PYRIDOXINE) 50 MG tablet Take 0.5 tablets by mouth in the morning, at noon, and at bedtime  Patient not taking: No sig reported 6/1/22   Fe Mason MD   Prenatal Vit-Fe Fumarate-FA (PRENATAL VITAMIN) 27-0.8 MG TABS Take 1 tablet by mouth daily  Patient not taking: Reported on 9/21/2022 5/4/22   Hermelinda Ware MD       REVIEW OF SYSTEMS    (2-9 systems for level 4, 10 or more for level 5)    Review of Systems   Constitutional:  Positive for fever. HENT:  Negative for congestion, rhinorrhea and sore throat. Respiratory:  Positive for shortness of breath. Cardiovascular:  Positive for chest pain. Negative for leg swelling. Gastrointestinal:  Negative for abdominal pain, nausea and vomiting. Endocrine: Negative for polyuria. Genitourinary:  Negative for decreased urine volume, difficulty urinating, dysuria, flank pain, frequency, hematuria, urgency, vaginal bleeding and vaginal discharge. Musculoskeletal:  Negative for back pain and neck pain. Neurological:  Positive for headaches. Negative for dizziness, syncope, weakness and light-headedness. Psychiatric/Behavioral:  The patient is nervous/anxious. PHYSICAL EXAM   (up to 7 for level 4, 8 or more for level 5)    VITALS:   Vitals:    09/21/22 1407 09/21/22 1416 09/21/22 1432 09/21/22 1447   BP: (!) 99/55 (!) 109/55 117/81 (!) 93/49   Pulse: (!) 130 (!) 139 (!) 134 (!) 131   Resp: 16 21 18 29   Temp:       TempSrc:       SpO2: 100% 100% 98% 100%       Physical Exam  Vitals and nursing note reviewed. Constitutional:       General: She is not in acute distress. Appearance: She is well-developed. She is obese. She is not diaphoretic. HENT:      Head: Normocephalic and atraumatic. Eyes:      Extraocular Movements: Extraocular movements intact. Conjunctiva/sclera: Conjunctivae normal.      Pupils: Pupils are equal, round, and reactive to light. Cardiovascular:      Rate and Rhythm: Regular rhythm. Tachycardia present. Heart sounds: Normal heart sounds. No murmur heard.   Pulmonary:      Effort: Pulmonary effort is normal. MEDICATIONS ORDERED:  Orders Placed This Encounter   Medications    0.9 % sodium chloride bolus    acetaminophen (TYLENOL) tablet 1,000 mg    iopamidol (ISOVUE-370) 76 % injection 75 mL    0.9 % sodium chloride bolus    nirmatrelvir/ritonavir (PAXLOVID) 20 x 150 MG & 10 x 100MG TBPK     Sig: Take 3 tablets (two 150 mg nirmatrelvir and one 100 mg ritonavir tablets) by mouth every 12 hours for 5 days. Dispense:  30 tablet     Refill:  0     Order Specific Question:   Does this patient qualify for COVID-19 antIviral therapy based on criteria for treatment?      Answer:   Yes    0.9 % sodium chloride infusion       DIAGNOSTIC RESULTS / EMERGENCYDEPARTMENT COURSE / MDM   LABS:  Labs Reviewed   COVID-19, RAPID - Abnormal; Notable for the following components:       Result Value    SARS-CoV-2, Rapid DETECTED (*)     All other components within normal limits   CBC WITH AUTO DIFFERENTIAL - Abnormal; Notable for the following components:    RBC 3.68 (*)     Hemoglobin 10.4 (*)     Hematocrit 30.0 (*)     MCV 81.5 (*)     Seg Neutrophils 93 (*)     Lymphocytes 2 (*)     Absolute Lymph # 0.17 (*)     All other components within normal limits   COMPREHENSIVE METABOLIC PANEL - Abnormal; Notable for the following components:    Glucose 116 (*)     Sodium 133 (*)     CO2 18 (*)     Total Bilirubin <0.1 (*)     All other components within normal limits   LACTATE DEHYDROGENASE - Abnormal; Notable for the following components:     (*)     All other components within normal limits   BLOOD GAS, VENOUS - Abnormal; Notable for the following components:    pH, Preston 7.455 (*)     pCO2, Preston 24.4 (*)     pO2, Preston 158.0 (*)     HCO3, Venous 16.9 (*)     Negative Base Excess, Preston 5.4 (*)     O2 Sat, Preston 99.3 (*)     All other components within normal limits   RAPID INFLUENZA A/B ANTIGENS   CULTURE, URINE   CULTURE, BLOOD 1   CULTURE, BLOOD 1   URIC ACID   TROPONIN   BRAIN NATRIURETIC PEPTIDE   D-DIMER, QUANTITATIVE   PRENATAL PROFILE I   HIV SCREEN   RUBELLA ANTIBODY, IGG   T. PALLIDUM AB   URINALYSIS WITH MICROSCOPIC   PROTEIN / CREATININE RATIO, URINE   CYSTIC FIBROSIS GENE TEST   URINE DRUG SCREEN   HEPATITIS C ANTIBODY   HEPATITIS B SURFACE ANTIGEN   HEMOGLOBINOPATHY EVALUATION   LACTATE, SEPSIS   LACTATE, SEPSIS   LACTATE, SEPSIS   LACTATE, SEPSIS   LACTATE, SEPSIS   LACTATE, SEPSIS   TYPE AND SCREEN       RADIOLOGY:  XR CHEST PORTABLE    Result Date: 9/21/2022  EXAMINATION: ONE XRAY VIEW OF THE CHEST 9/21/2022 8:59 am COMPARISON: 07/16/2021. HISTORY: ORDERING SYSTEM PROVIDED HISTORY: SOB tachycardia TECHNOLOGIST PROVIDED HISTORY: Please shield abd 23 weeks pregnant SOB tachycardia FINDINGS: The heart size is within normal limits. The pulmonary vasculature is also within normal limits. No acute infiltrates are seen. The costophrenic angles are sharp bilaterally. No pneumothoraces are noted. 1. No active pulmonary disease. CT CHEST PULMONARY EMBOLISM W CONTRAST    Result Date: 9/21/2022  EXAMINATION: CTA OF THE CHEST 9/21/2022 10:24 am TECHNIQUE: CTA of the chest was performed after the administration of intravenous contrast.  Multiplanar reformatted images are provided for review. MIP images are provided for review. Automated exposure control, iterative reconstruction, and/or weight based adjustment of the mA/kV was utilized to reduce the radiation dose to as low as reasonably achievable. COMPARISON: None. HISTORY: ORDERING SYSTEM PROVIDED HISTORY: COVID+, pregnant, +ddimer, tachycardia TECHNOLOGIST PROVIDED HISTORY: COVID+, pregnant, +ddimer, tachycardia Decision Support Exception - unselect if not a suspected or confirmed emergency medical condition->Emergency Medical Condition (MA) FINDINGS: Pulmonary Arteries: Pulmonary arteries are adequately opacified for evaluation. No evidence of intraluminal filling defect to suggest pulmonary embolism. Main pulmonary artery is normal in caliber.  Mediastinum: No evidence of mediastinal lymphadenopathy. The heart and pericardium demonstrate no acute abnormality. There is no acute abnormality of the thoracic aorta. Lungs/pleura: The lungs are without acute process. No focal consolidation or pulmonary edema. No evidence of pleural effusion or pneumothorax. Upper Abdomen: Limited images of the upper abdomen are unremarkable. Soft Tissues/Bones: No acute bone or soft tissue abnormality. 1.No evidence of pulmonary embolism or acute pulmonary abnormality. EKG    EKG Interpretation    Interpreted by emergency department physician    Rhythm: sinus tachycardia  Rate: tachycardia  Axis: normal  Ectopy: none  Conduction: normal  ST Segments: no acute change  T Waves: no acute change  Q Waves: none    Clinical Impression: non-specific EKG    All EKG's are interpreted by the Emergency Department Physician whoeither signs or Co-signs this chart in the absence of a cardiologist.    EMERGENCY DEPARTMENT COURSE:  ED Course as of 09/21/22 1510   Wed Sep 21, 2022   0919 XR CHEST PORTABLE [AO]   0925 CBC with Auto Differential(!):    WBC 8.6   RBC 3.68(!)   Hemoglobin Quant 10.4(!)   Hematocrit 30. 0(!)   MCV 81.5(!)   MCH 28.3   MCHC 34.7   RDW 13.4   Platelet Count 637   MPV 10.8   NRBC Automated 0.0   Seg Neutrophils PENDING   Lymphocytes PENDING   Monocytes PENDING   Eosinophils % PENDING   Basophils PENDING   Immature Granulocytes PENDING   Segs Absolute PENDING   Absolute Lymph # PENDING   Absolute Mono # PENDING   Absolute Eos # PENDING   Basophils Absolute PENDING   Absolute Immature Granulocyte PENDING [AO]   0959 Troponin, High Sensitivity: <6 [AO]   0959 Pro-BNP: 58 [AO]   7001 D-Dimer, Quant: 0.83 [AO]   0959 CBC with Auto Differential(!):    WBC 8.6   RBC 3.68(!)   Hemoglobin Quant 10.4(!)   Hematocrit 30. 0(!)   MCV 81.5(!)   MCH 28.3   MCHC 34.7   RDW 13.4   Platelet Count 520   MPV 10.8   NRBC Automated 0.0   Seg Neutrophils PENDING   Lymphocytes PENDING   Monocytes PENDING Eosinophils % PENDING   Basophils PENDING   Immature Granulocytes PENDING   Segs Absolute PENDING   Absolute Lymph # PENDING   Absolute Mono # PENDING   Absolute Eos # PENDING   Basophils Absolute PENDING   Absolute Immature Granulocyte PENDING [AO]   1004 RAPID INFLUENZA A/B ANTIGENS:    Flu A Antigen NEGATIVE   Flu B Antigen NEGATIVE [AO]   1475 COVID-19, Rapid(!):    Specimen Description . NASOPHARYNGEAL SWAB   SARS-CoV-2, Rapid DETECTED(!) [AO]   1004 Comprehensive Metabolic Panel(!):    Glucose, Random 116(!)   BUN,BUNPL 7   Creatinine 0.51   CALCIUM, SERUM, 680906 9.2   Sodium 133(!)   Potassium 3.7   Chloride 101   CO2 18(!)   Anion Gap 14   Alk Phos 87   ALT 12   AST 14   Bilirubin <0.1(!)   Total Protein 7.1   Albumin 3.7   ALBUMIN/GLOBULIN RATIO 1.1   GFR Non- >60   GFR  >60   GFR Comment      [AO]   1048 Consent signed with patient for CT after speaking to OB [AO]   1048 CT CHEST PULMONARY EMBOLISM W CONTRAST [AO]   1119 URIC ACID,URA: 2.6 [AO]   1120 LD(!): 113 [AO]   4796 Blood Gas, Venous(!):    pH, Preston 7.455(!)   pCO2, Preston 24.4(!)   pO2, Preston 158.0(!)   HCO3, Venous 16.9(!)   Negative Base Excess, Preston 5.4(!)   O2 Sat, Preston 99.3(!)   Carboxyhemoglobin 2.8   Pt Temp 37.0   FIO2 INFORMATION NOT PROVIDED [AO]   1222 BP(!): 97/41 [AO]   1223 BP(!): 96/38 [AO]   1237 BP(!): 97/41 [AO]   26 OB touched base again [AO]      ED Course User Index  [AO] Asmita Stefano Merchant 1721, DO       MDM  Number of Diagnoses or Management Options  COVID-19  Diagnosis management comments: 28-year-old female who is currently 20 weeks pregnant presents emergency department with headache, shortness of breath, generally unwell feeling. Initial evaluation she is tachycardic, tachypneic. However not hypoxic or febrile. Lung sounds clear. Heart fast but regular rhythm. No leg swelling. Chest x-ray clear, troponin negative, BNP negative. EKG shows sinus tachycardia but no concerning morphology changes. He dimer positive. Had long discussion after talking to both patient and will be and we all agree that this is best interest to rule out a PE.  CT of the chest negative. Venous blood gas showed some respiratory alkalosis with diagnostic prize due to her breathing which I believe is partially due to anxiety. Patient's blood pressure did start to decrease while she was here and despite 2 L of fluid she remained tachycardic. She was unable to produce urine sample, however no evidence of pneumonia. No elevation in white blood cell count. No source of infection that I would trigger a sepsis rule out while in the emergency department, however we did discuss it with nursing staff. Lactic acid and blood cultures were ordered after admission. Loss of IV access, difficulty obtaining line, requested PICC consult. She did not have any abnormal vaginal discharge vaginal bleeding or abdominal pain. OB did not feel the need to be admitted from their standpoint, however due to patient's persistent abnormal vital signs I felt uncomfortable letting patient go home. She was not appropriate for observation due to her abnormal vital signs and therefore I contacted internal medicine for admission. Amount and/or Complexity of Data Reviewed  Clinical lab tests: ordered and reviewed  Tests in the radiology section of CPT®: ordered and reviewed  Review and summarize past medical records: yes  Discuss the patient with other providers: yes  Independent visualization of images, tracings, or specimens: yes    Patient Progress  Patient progress: stable      PROCEDURES:  Procedures     CONSULTS:  IP CONSULT TO OB GYN  IP CONSULT TO INTERNAL MEDICINE  IP CONSULT TO IV TEAM  IP CONSULT TO CASE MANAGEMENT  IP CONSULT TO INFECTIOUS DISEASES    CRITICAL CARE:  NONE    FINAL IMPRESSION     1. COVID-19        DISPOSITION / [address]  Admitted 09/21/2022 02:51:54 PM      Alcira Fiore, [address] [place] [address]  Emergency Medicine Physician    (Please

## 2022-09-21 NOTE — CONSULTS
Obstetric/Gynecology Resident Interval Note    Discussed patient with ED Attending. Patient presents with chest pain and SOB that started last night. She is 23w5d. Workup in ED pending. BSUS in ED showed fetal movement with FHR in the 140s. Patient to get CT PE in the ER . After workup in ED, patient will come to L&D for further monitoring and evaluation.      Nicholas Vega MD  OB/GYN Resident, PGY1  [facility] [place]  9/21/2022, 9:59 AM

## 2022-09-21 NOTE — ED NOTES
Pt blood pressures low, cuff switched to left side and pt asked to lie on their back instead of on their side.       Karol Biggs RN  09/21/22 8762

## 2022-09-21 NOTE — ED TRIAGE NOTES
Patient presented to the ED today with complaints of SOB, chest pain and fever. Patient stated that symptoms presented last night.

## 2022-09-21 NOTE — Clinical Note
Discharge Plan[de-identified] Other/[facility])   Telemetry/Cardiac Monitoring Required?: Yes

## 2022-09-21 NOTE — PROGRESS NOTES
[address]    Admission Medication Reconciliation       The patient's list of current home medications has been reviewed. Source(s) of information: Dispense report    Based on information provided by the above source(s), I have updated the patient's home med list as described below. Please review the ACTION REQUESTED section of this note below for any discrepancies on current hospital orders. I changed or updated the following medications on the patient's home medication list:  Removed acetaminophen     Added      Adjusted      Other Notes See home med list for last fill dates. PROVIDER ACTION REQUESTED  Medications that need to be addressed by a physician/nurse practitioner:    Medication Action Requested                 Please feel free to call me with any questions about this encounter. Thank you. Thank you  Cuate Ferreira.  Minda Rosales, PharmD  9/21/2022 3:09 PM

## 2022-09-21 NOTE — PROGRESS NOTES
Obstetric/Gynecology Resident Interval Note    Labs overall stable. Discussed with ED provider. Due to labile vitals and patient's persistent symptoms, ED recommends patient to be admitted inpatient for symptomatic treatment. Recommend internal medicine consultation for admission for COVID symptoms. From an obstetrical view point, patient is stable with no concerns. Will continue to follow during admission.      Larisa Cordero MD  OB/GYN Resident, PGY1  [facility]  9/21/2022, 1:09 PM

## 2022-09-21 NOTE — ED NOTES
Report attempted to be called to [facility]. Vera Smith stated that patient is not able to come up without IV access due to her blood pressure. Pt has had a PICC team consult In since [phone] and they are in a procedure at this time and unable to come down.   Writer notified [facility] manager of this issue       Melchor Smith RN  09/21/22 4730

## 2022-09-21 NOTE — PROGRESS NOTES
[facility]   Department of Internal Medicine - Staff Internal Medicine Teaching Service        Senior Note    Date: 9/21/2022  Patient Name: Aissatou Chester  MRN: [id number]  Date of Admission: 9/21/2022  8:40 AM  YOB: 2000  Primary Care Physician: JAM Rodriguez - CNP  Attending Physician: Adam Ambrosio MD   Room: [address] [place] [address]    Brief Summary:-    This is a 23 y.o. female admitted 9/21/2022 for COVID-19 [U07.1]. See H&P of admitting/intern resident for more details. Chief Complaint : Shortness of breath, headache and chest pain. Significant Past Medical History :     23 weeks gestation  Hx of recurrent miscarriages   Hx of multiple STIs (treated per OB/GYN)  Obesity (BMI 30.17)      HPI: 72-year-old female who presented with complains of fatigue, shortness of breath and chest pain. Pain was located substernal, dull in nature, resolved spontaneously without radiation. Patient reported that she started feeling all the symptoms since yesterday. She reported that her grandmother was sick however she did not know her COVID status. Patient is unvaccinated with COVID. She is currently 23 weeks pregnant. Patient has a history of 3 prior miscarriages. Patient reported that she does not feel fetal movement however that is normal for her. Patient denies any fever, chills, abdominal pain, nausea, vomiting, cough or sore throat. She reported that her chest pain was resolved on my evaluation. She feels subjective shortness of breath. On exam, patient was alert awake oriented x4, in mild acute distress. Attending lung exam is within normal limits with clear breath sounds bilaterally and normal heart sounds without any murmurs or gallops. Patient was hypotensive in the ED with blood pressures 90s/40s-50s. He received 2 L normal saline bolus in the ED. Blood pressures improved to 100s/50s.   She has also been persistently tachycardic with heart rate 120s to 130s. EKG shows sinus tachycardia. Patient saturating well on room air. OB/GYN evaluated the patient and did not recommend any further work-up or admission. However, due to persistent low blood pressure and tachycardia patient was admitted under internal medicine and OB/GYN will follow the patient during hospitalization. Vitals : BP (!) 109/49   Pulse (!) 130   Temp 100.1 °F (37.8 °C) (Oral)   Resp 21   Ht 5' 2\" (1.575 m)   Wt 171 lb (77.6 kg)   LMP 04/08/2022 (Exact Date)   SpO2 99%   BMI 31.28 kg/m²         Initial Labs :      BMP:   Recent Labs     09/21/22  0910   *   K 3.7      CO2 18*   BUN 7   CREATININE 0.51   GLUCOSE 116*     CBC: )  Recent Labs     09/21/22  0910 09/21/22  8808   WBC 8.6 DUPLICATE ORDER   HGB 81.7* DUPLICATE ORDER   HCT 01.7* DUPLICATE ORDER    DUPLICATE ORDER          Imaging :     CXR: 9/21/2022  Impression   1. No active pulmonary disease. CT Chest PE 9/21/2022:   Impression   1. No evidence of pulmonary embolism or acute pulmonary abnormality. Assessment & Plan :    Principal Problem:    Hypotension  Active Problems:    History of recurrent miscarriages (x3), currently pregnant    COVID-19    Tachycardia    Chlamydia (TOR neg)    Trichomonas vaginalis (TV) infection (2/2022)    Gonorrhea    cHTN (no meds)  Resolved Problems:    * No resolved hospital problems. *    Hypotension: BP 90-100s/40-50s. Rule out sepsis. Received 2 L NS bolus in the ED. Continue IV fluid normal saline at 100 mL/h. Follow-up on blood and urine cultures. UA is negative for UTI. Pro-Flash negative. Sinus tachycardia: Rate in 120s to 130s. Received normal saline 2 L bolus. Continue maintenance IV fluids normal saline at 100 mL/h. Monitor on telemetry. Lopressor 5 mg IV once. COVID-19 infection: Not requiring any oxygen. ID following: Trial of antibiotics, no other treatments indicated for COVID.   Continue to monitor SPO2 and supplemental oxygen as needed for SPO2 less than 90%. 23 weeks gestation: History of recurrent miscarriages x3; OB/GYN following. Management per OB. Marijuana use disorder: Patient denies marijuana use however U tox shows cannabinoids. Counseled on marijuana cessation. History of STI: Chlamydia in 03/2022-treated. Gonorrhea and 7/2021-treated per OB/GYN. Trichomonas in 2/2022. Per OB. Resume Home medications as tolerated by the patient. Diet : Adult regular diet  GI prophylaxis: Not indicated  DVT prophylaxis : Lovenox    PT & OT: Consulted   : To assist in discharge planning. Monica Samaniego MD  Internal Medicine Resident, PGY- [address], [place]  9/21/2022, 6:59 PM

## 2022-09-21 NOTE — ED NOTES
Report given to [facility]. Per unit request, will wait to transport pt until IV in place.      Richelle Webster RN  09/21/22 8495

## 2022-09-21 NOTE — CONSULTS
[address] [facility]    Patient Name: Aissatou Chester     Patient : 2000  Room/Bed:   Admission Date/Time: 2022  8:40 AM  Primary Care Physician: JAM Rodriguez CNP      CC:   Chief Complaint   Patient presents with    Shortness of Breath    Chest Pain    Fever                HPI: Aissatou Chester is a 23 y.o. female  @ 23w5d who presents with c/o tiredness, shortness or breath and chest pain. Patient's COVID test came back positive and her HR was elevated up the 140s. Patient states she does not feel fetal movement however that is normal as she generally only feels the baby at night. She denies vaginal bleeding, leakage of fluid or contractions. BSUS by ED provider noted positive fetal movement and positive heart tones in the 140s. On exam, patient was pleasant, able to speak, and in no acute distress. Stated that her main complaint was malaise and tiredness. REVIEW OF SYSTEMS:   A minimum of an eleven point review of systems was completed.   Constitutional: negative fever, negative chills, negative weight changes   HEENT: negative visual disturbances, negative headaches, negative dizziness, negative hearing loss  Respiratory: negative dyspnea, negative cough, positive SOB  Cardiovascular: positive chest pain,  negative palpitations, negative arrhythmia, negative syncope   Gastrointestinal: negative abdominal pain, negative RUQ pain, negative N/V, negative diarrhea, negative constipation, negative bowel changes, negative heartburn   Genitourinary: negative dysuria, negative hematuria, negative urinary incontinence, negative vaginal discharge, negative vaginal bleeding or spotting  Dermatological: negative rash, negative pruritis, negative mole or other skin changes  Hematologic: negative bruising  Immunologic/Lymphatic: negative recent illness, negative recent sick contact  Musculoskeletal: negative back pain, negative myalgias, negative arthralgias  Neurological:  negative dizziness, negative migraines, negative seizures, negative weakness  Behavior/Psych: negative depression, negative anxiety, negative SI, negative HI      OBSTETRIC HISTORY:   OB History    Para Term  AB Living   4 0 0 0 3 0   SAB IAB Ectopic Molar Multiple Live Births   3 0 0 0 0 0      # Outcome Date GA Lbr Moiz/2nd Weight Sex Delivery Anes PTL Lv   4 Current            3 2021     SAB      2 SAB      SAB      1 2020              Obstetric Comments   G1-G4: FOB#1       PAST MEDICAL HISTORY:   has a past medical history of BV (bacterial vaginosis), Chlamydia, Chlamydia infection, Chlamydia infection affecting pregnancy, cHTN (no meds), Closed fracture of phalanx of middle finger, Gonorrhea, History of blood transfusion, Seasonal allergies, Strep throat, Trichomonas infection, and Trichomonas vaginitis. PAST SURGICAL HISTORY:   has no past surgical history on file. ALLERGIES:  Allergies as of 2022    (No Known Allergies)       MEDICATIONS:  No current facility-administered medications for this encounter.      Current Outpatient Medications   Medication Sig Dispense Refill    ondansetron (ZOFRAN) 4 MG tablet Take 1 tablet by mouth daily as needed for Nausea or Vomiting (Patient not taking: Reported on 2022) 30 tablet 3    aspirin EC 81 MG EC tablet Take 1 tablet by mouth daily (Patient not taking: Reported on 2022) 90 tablet 1    vitamin B-6 (PYRIDOXINE) 50 MG tablet Take 0.5 tablets by mouth in the morning, at noon, and at bedtime (Patient not taking: No sig reported) 60 tablet 0    Prenatal Vit-Fe Fumarate-FA (PRENATAL VITAMIN) 27-0.8 MG TABS Take 1 tablet by mouth daily 30 tablet 3    acetaminophen (TYLENOL) 500 MG tablet Take 2 tablets by mouth every 8 hours as needed for Pain (Patient not taking: Reported on 2022) 180 tablet 1       FAMILY HISTORY:  family history includes Arthritis in her maternal grandmother; Diabetes in her maternal grandfather; Heart Attack in her maternal grandmother; No Known Problems in her brother, mother, paternal grandfather, paternal grandmother, and sister; Pancreatic Cancer in her father; Stroke in her maternal grandfather. SOCIAL HISTORY:   reports that she has never smoked. She has been exposed to tobacco smoke. She has never used smokeless tobacco. She reports that she does not currently use alcohol. She reports that she does not use drugs. ________________________________________________________________________                                    Jorgito Muskrat:  Vitals:    09/21/22 0935 09/21/22 0947 09/21/22 1002 09/21/22 1015   BP: 127/61 (!) 100/53 106/77    Pulse: (!) 112 (!) 118 (!) 127 (!) 127   Resp: 19 23 16    Temp:       TempSrc:       SpO2: 95% 100% 100%                                                    INPUT/OUTPUT:  No intake/output data recorded. No intake/output data recorded. PHYSICAL EXAM:     General Appearance: Appears healthy. Alert; in no acute distress. Pleasant. Skin: Skin color, texture, turgor normal.   Neck and EENT: normal atraumatic, no neck masses  Abdomen: soft, non-tender, and non-distended,  Pelvic Exam: not indicated     LAB RESULTS:  Results for orders placed or performed during the hospital encounter of 09/21/22   COVID-19, Rapid    Specimen: Nasopharyngeal Swab   Result Value Ref Range    Specimen Description . NASOPHARYNGEAL SWAB     SARS-CoV-2, Rapid DETECTED (A) Not Detected   RAPID INFLUENZA A/B ANTIGENS    Specimen: Nasopharyngeal   Result Value Ref Range    Flu A Antigen NEGATIVE NEGATIVE    Flu B Antigen NEGATIVE NEGATIVE   CBC with Auto Differential   Result Value Ref Range    WBC 8.6 4.5 - 13.5 k/uL    RBC 3.68 (L) 3.95 - 5.11 m/uL    Hemoglobin 10.4 (L) 11.9 - 15.1 g/dL    Hematocrit 30.0 (L) 36.3 - 47.1 %    MCV 81.5 (L) 82.6 - 102.9 fL    MCH 28.3 25.2 - 33.5 pg    MCHC 34.7 28.4 - 34.8 g/dL    RDW 13.4 11.8 - 14.4 %    Platelets 645 028 - 875 k/uL    MPV 10.8 8.1 - 13.5 fL    NRBC Automated 0.0 0.0 per 100 WBC    Seg Neutrophils PENDING %    Lymphocytes PENDING %    Monocytes PENDING %    Eosinophils % PENDING %    Basophils PENDING %    Immature Granulocytes PENDING 0 %    Segs Absolute PENDING k/uL    Absolute Lymph # PENDING k/uL    Absolute Mono # PENDING k/uL    Absolute Eos # PENDING k/uL    Basophils Absolute PENDING 0.0 - 0.2 k/uL    Absolute Immature Granulocyte PENDING 0.00 - 0.30 k/uL   Comprehensive Metabolic Panel   Result Value Ref Range    Glucose 116 (H) 70 - 99 mg/dL    BUN 7 6 - 20 mg/dL    Creatinine 0.51 0.50 - 0.90 mg/dL    Calcium 9.2 8.6 - 10.4 mg/dL    Sodium 133 (L) 135 - 144 mmol/L    Potassium 3.7 3.7 - 5.3 mmol/L    Chloride 101 98 - 107 mmol/L    CO2 18 (L) 20 - 31 mmol/L    Anion Gap 14 9 - 17 mmol/L    Alkaline Phosphatase 87 35 - 104 U/L    ALT 12 5 - 33 U/L    AST 14 <32 U/L    Total Bilirubin <0.1 (L) 0.3 - 1.2 mg/dL    Total Protein 7.1 6.4 - 8.3 g/dL    Albumin 3.7 3.5 - 5.2 g/dL    Albumin/Globulin Ratio 1.1 1.0 - 2.5    GFR Non-African American >60 >60 mL/min    GFR African American >60 >60 mL/min    GFR Comment         Troponin   Result Value Ref Range    Troponin, High Sensitivity <6 0 - 14 ng/L   Brain Natriuretic Peptide   Result Value Ref Range    Pro-BNP 58 <300 pg/mL   D-Dimer, Quantitative   Result Value Ref Range    D-Dimer, Quant 0.83 mg/L FEU         DIAGNOSTICS:    XR CHEST PORTABLE    Result Date: 9/21/2022  EXAMINATION: ONE XRAY VIEW OF THE CHEST 9/21/2022 8:59 am COMPARISON: 07/16/2021. HISTORY: ORDERING SYSTEM PROVIDED HISTORY: SOB tachycardia TECHNOLOGIST PROVIDED HISTORY: Please shield abd 23 weeks pregnant SOB tachycardia FINDINGS: The heart size is within normal limits. The pulmonary vasculature is also within normal limits. No acute infiltrates are seen.  The costophrenic angles are sharp bilaterally. No pneumothoraces are noted. 1. No active pulmonary disease. CT PE 22  Impression   1. No evidence of pulmonary embolism or acute pulmonary abnormality. ASSESSMENT & PLAN:    Thalia Garcia is a 23 y.o. female  at 23w5d who presents with symptomatic COVID. Tachycardia   - Patient HR elevated to the 120's   - Troponin, BNP, DDimer negative on labs   - Chest XRAY negative   - CT PE negative   - EKG showed tachycardia but everything else wnl   - Unlikely to be cardiac etiology   - S/s of COVID    Tachypnea    - One elevated RR to 32   - SPO2 largely normal, at 98   - CT PE negative. Low concern for PE   - CMP shows HCO3- at 18   - Concerns for respiratory compensations   - VBG ordered   - On exam, patient able to have a comfortable conversation without increased work of breathing   - States chest pain has improved   - If VBG is wnl, patient can be discharged home with symptomatic treatment and return precautions      Patient is clear from an OB standpoint regarding her pregnancy. Decision of admission will be made pending patient labs.       Patient Active Problem List    Diagnosis Date Noted    History of recurrent miscarriages (x3), currently pregnant 2022     Priority: Medium     2020: SAB  : SAB  : SAB      Exposure to genital herpes (current partner) 2022     Priority: Medium    cHTN (no meds) 2022     /91 on   139/93 on       Chlamydia (TOR neg) 2022     3/2022: Treated with Azithromycin  TOR- see labs      Trichomonas vaginalis (TV) infection (2022) 2022      Gonorrhea 2022      High-risk pregnancy 2022     Risk factors: Obesity (BMI 30.17), chlamydia in pregnancy, COVID vaccine series not completed, recurrent miscarriage (x3)      Obesity (BMI 30.17) affecting pregnancy in first trimester 2022     Early one-hour glucose tolerance test ordered per protocol      COVID-19 vaccine series not completed 2022     Pt counseled on the the risks of not getting vaccinated in pregnancy against COVID-19. Pregnant women with symptomatic covid have a 70% increased risk of death. Covid-19 during pregnancy  increases the risk for adverse outcomes, including  birth and admission of the baby to an intensive care unit. Plan discussed with Dr. Sandy Benavidez, who is agreeable. Attending's Name: Dr. Alyson Shannon MD  Ob/Gyn Resident   [facility]  2022, 10:44 AM    Date: 2022  Time: 8:31 AM      Patient Name: Rhett Cisneros  Patient : 2000  Room/Bed: 8857/6541-13  Admission Date/Time: 2022  8:40 AM        Attending Physician Statement  I have discussed the care of Rhett Cisneros, including pertinent history and exam findings with the resident. I have reviewed and edited their note in the electronic medical record. The key elements of all parts of the encounter have been performed/reviewed by me . I agree with the assessment, plan and orders as documented by the resident. The level of care submitted represents to the best of my ability the care documented in the medical record today. GC Modifier. This service has been performed in part by a resident under the direction of a teaching physician.         Attending's Name:  Joseph Luis DO

## 2022-09-21 NOTE — ED NOTES
Report called to [facility], all questions answered     Basia Escalera RN  09/21/22 0064

## 2022-09-22 VITALS
DIASTOLIC BLOOD PRESSURE: 72 MMHG | WEIGHT: 171 LBS | OXYGEN SATURATION: 97 % | SYSTOLIC BLOOD PRESSURE: 125 MMHG | HEART RATE: 103 BPM | HEIGHT: 62 IN | TEMPERATURE: 98.2 F | RESPIRATION RATE: 16 BRPM | BODY MASS INDEX: 31.47 KG/M2

## 2022-09-22 PROBLEM — U07.1 COVID-19 VIRUS INFECTION: Status: ACTIVE | Noted: 2022-09-22

## 2022-09-22 PROBLEM — Z3A.23 23 WEEKS GESTATION OF PREGNANCY: Status: ACTIVE | Noted: 2022-09-22

## 2022-09-22 LAB
ABSOLUTE EOS #: <0.03 K/UL (ref 0–0.44)
ABSOLUTE IMMATURE GRANULOCYTE: 0.04 K/UL (ref 0–0.3)
ABSOLUTE LYMPH #: 0.7 K/UL (ref 1.2–5.2)
ABSOLUTE MONO #: 0.46 K/UL (ref 0.1–1.4)
ANION GAP SERPL CALCULATED.3IONS-SCNC: 12 MMOL/L (ref 9–17)
BASOPHILS # BLD: 0 % (ref 0–2)
BASOPHILS ABSOLUTE: <0.03 K/UL (ref 0–0.2)
BUN BLDV-MCNC: 4 MG/DL (ref 6–20)
C-REACTIVE PROTEIN: 28.4 MG/L (ref 0–5)
CALCIUM SERPL-MCNC: 8.6 MG/DL (ref 8.6–10.4)
CARBOXYHEMOGLOBIN: 1 % (ref 0–5)
CHLORIDE BLD-SCNC: 105 MMOL/L (ref 98–107)
CO2: 19 MMOL/L (ref 20–31)
CREAT SERPL-MCNC: 0.39 MG/DL (ref 0.5–0.9)
CULTURE: NORMAL
EOSINOPHILS RELATIVE PERCENT: 0 % (ref 1–4)
FIO2: ABNORMAL
GFR AFRICAN AMERICAN: >60 ML/MIN
GFR NON-AFRICAN AMERICAN: >60 ML/MIN
GFR SERPL CREATININE-BSD FRML MDRD: ABNORMAL ML/MIN/{1.73_M2}
GLUCOSE BLD-MCNC: 91 MG/DL (ref 70–99)
HCO3 VENOUS: 20.6 MMOL/L (ref 24–30)
HCT VFR BLD CALC: 26.3 % (ref 36.3–47.1)
HEMOGLOBIN: 8.9 G/DL (ref 11.9–15.1)
HEPATITIS B SURFACE ANTIGEN: NONREACTIVE
HEPATITIS C ANTIBODY: NONREACTIVE
IMMATURE GRANULOCYTES: 1 %
INR BLD: 0.9
LACTIC ACID, SEPSIS WHOLE BLOOD: 0.9 MMOL/L (ref 0.5–1.9)
LACTIC ACID, SEPSIS WHOLE BLOOD: 1.6 MMOL/L (ref 0.5–1.9)
LACTIC ACID, WHOLE BLOOD: 0.7 MMOL/L (ref 0.7–2.1)
LYMPHOCYTES # BLD: 19 % (ref 25–45)
MCH RBC QN AUTO: 27.9 PG (ref 25.2–33.5)
MCHC RBC AUTO-ENTMCNC: 33.8 G/DL (ref 28.4–34.8)
MCV RBC AUTO: 82.4 FL (ref 82.6–102.9)
MONOCYTES # BLD: 13 % (ref 2–8)
NEGATIVE BASE EXCESS, VEN: 3.1 MMOL/L (ref 0–2)
NRBC AUTOMATED: 0 PER 100 WBC
O2 SAT, VEN: 87.8 % (ref 60–85)
PARTIAL THROMBOPLASTIN TIME: 29.5 SEC (ref 20.5–30.5)
PATIENT TEMP: 37
PCO2, VEN: 33.3 MM HG (ref 39–55)
PDW BLD-RTO: 13.8 % (ref 11.8–14.4)
PH VENOUS: 7.41 (ref 7.32–7.42)
PLATELET # BLD: ABNORMAL K/UL (ref 138–453)
PLATELET, FLUORESCENCE: 114 K/UL (ref 138–453)
PLATELET, IMMATURE FRACTION: 3.5 % (ref 1.1–10.3)
PO2, VEN: 54.2 MM HG (ref 30–50)
POTASSIUM SERPL-SCNC: 3.6 MMOL/L (ref 3.7–5.3)
PROTHROMBIN TIME: 9.6 SEC (ref 9.1–12.3)
RBC # BLD: 3.19 M/UL (ref 3.95–5.11)
RBC # BLD: ABNORMAL 10*6/UL
SEG NEUTROPHILS: 67 % (ref 34–64)
SEGMENTED NEUTROPHILS ABSOLUTE COUNT: 2.42 K/UL (ref 1.8–8)
SODIUM BLD-SCNC: 136 MMOL/L (ref 135–144)
SPECIMEN DESCRIPTION: NORMAL
VITAMIN D 25-HYDROXY: 19.5 NG/ML
WBC # BLD: 3.6 K/UL (ref 4.5–13.5)

## 2022-09-22 PROCEDURE — 2580000003 HC RX 258: Performed by: EMERGENCY MEDICINE

## 2022-09-22 PROCEDURE — 6370000000 HC RX 637 (ALT 250 FOR IP)

## 2022-09-22 PROCEDURE — 80048 BASIC METABOLIC PNL TOTAL CA: CPT

## 2022-09-22 PROCEDURE — 83605 ASSAY OF LACTIC ACID: CPT

## 2022-09-22 PROCEDURE — 85730 THROMBOPLASTIN TIME PARTIAL: CPT

## 2022-09-22 PROCEDURE — 99222 1ST HOSP IP/OBS MODERATE 55: CPT | Performed by: INTERNAL MEDICINE

## 2022-09-22 PROCEDURE — 2580000003 HC RX 258: Performed by: INTERNAL MEDICINE

## 2022-09-22 PROCEDURE — 36415 COLL VENOUS BLD VENIPUNCTURE: CPT

## 2022-09-22 PROCEDURE — 81220 CFTR GENE COM VARIANTS: CPT

## 2022-09-22 PROCEDURE — 6370000000 HC RX 637 (ALT 250 FOR IP): Performed by: STUDENT IN AN ORGANIZED HEALTH CARE EDUCATION/TRAINING PROGRAM

## 2022-09-22 PROCEDURE — 85025 COMPLETE CBC W/AUTO DIFF WBC: CPT

## 2022-09-22 PROCEDURE — 85055 RETICULATED PLATELET ASSAY: CPT

## 2022-09-22 PROCEDURE — 82805 BLOOD GASES W/O2 SATURATION: CPT

## 2022-09-22 PROCEDURE — 86140 C-REACTIVE PROTEIN: CPT

## 2022-09-22 PROCEDURE — XW033H6 INTRODUCTION OF OTHER NEW TECHNOLOGY MONOCLONAL ANTIBODY INTO PERIPHERAL VEIN, PERCUTANEOUS APPROACH, NEW TECHNOLOGY GROUP 6: ICD-10-PCS | Performed by: INTERNAL MEDICINE

## 2022-09-22 PROCEDURE — 6360000002 HC RX W HCPCS: Performed by: INTERNAL MEDICINE

## 2022-09-22 PROCEDURE — 85610 PROTHROMBIN TIME: CPT

## 2022-09-22 PROCEDURE — 99232 SBSQ HOSP IP/OBS MODERATE 35: CPT | Performed by: INTERNAL MEDICINE

## 2022-09-22 RX ORDER — BEBTELOVIMAB 87.5 MG/ML
175 INJECTION, SOLUTION INTRAVENOUS ONCE
Status: COMPLETED | OUTPATIENT
Start: 2022-09-22 | End: 2022-09-22

## 2022-09-22 RX ORDER — SODIUM CHLORIDE 0.9 % (FLUSH) 0.9 %
5-40 SYRINGE (ML) INJECTION ONCE
Status: COMPLETED | OUTPATIENT
Start: 2022-09-22 | End: 2022-09-22

## 2022-09-22 RX ORDER — LANOLIN ALCOHOL/MO/W.PET/CERES
325 CREAM (GRAM) TOPICAL 2 TIMES DAILY
Qty: 90 TABLET | Refills: 3 | Status: SHIPPED | OUTPATIENT
Start: 2022-09-22

## 2022-09-22 RX ORDER — ASPIRIN 81 MG/1
81 TABLET, CHEWABLE ORAL DAILY
Status: DISCONTINUED | OUTPATIENT
Start: 2022-09-22 | End: 2022-09-22 | Stop reason: HOSPADM

## 2022-09-22 RX ADMIN — Medication 25 MG: at 14:29

## 2022-09-22 RX ADMIN — SODIUM CHLORIDE: 9 INJECTION, SOLUTION INTRAVENOUS at 03:50

## 2022-09-22 RX ADMIN — SODIUM CHLORIDE, PRESERVATIVE FREE 10 ML: 5 INJECTION INTRAVENOUS at 13:00

## 2022-09-22 RX ADMIN — Medication 1 TABLET: at 08:34

## 2022-09-22 RX ADMIN — ASPIRIN 81 MG: 81 TABLET, CHEWABLE ORAL at 08:34

## 2022-09-22 RX ADMIN — BEBTELOVIMAB 175 MG: 87.5 INJECTION, SOLUTION INTRAVENOUS at 13:00

## 2022-09-22 RX ADMIN — Medication 25 MG: at 08:34

## 2022-09-22 NOTE — PROGRESS NOTES
[facility])  Occupational Therapy Not Seen Note    DATE: 2022    NAME: Abbey Frazier  MRN: [id number]   : 2000      Patient not seen this date for Occupational Therapy due to:    Patient independent with ADLs and functional tasks with no acute OT needs. Will defer OT evaluation at this time. Please reorder OT if future needs arise. Spoke with RN who confirms pt's independence at this time.     Electronically signed by JEREMIAH Crum on 2022 at 9:05 AM

## 2022-09-22 NOTE — PROGRESS NOTES
Physical Therapy        Physical Therapy Cancel Note      DATE: 2022    NAME: Jovita Soto  MRN: [id number]   : 2000      Patient not seen this date for Physical Therapy due to:    Patient independent with functional mobility. Will defer PT evaluation at this time. Please reorder PT if future needs arise.        Electronically signed by Christopher Hooker PT on 2022 at 3:36 PM

## 2022-09-22 NOTE — PROGRESS NOTES
[facility]  Internal Medicine Teaching Residency Program  Inpatient Daily Progress Note  ______________________________________________________________________________    Patient: Dalton Parr  YOB: 2000   AKQ:[id number]    Acct: [de-identified]     Room: [facility]5614-  Admit date: 9/21/2022  Today's date: 09/22/22  Number of days in the hospital: 1    SUBJECTIVE   Admitting Diagnosis: Hypotension  CC: Shortness of breath and headache  Pt examined at bedside. Chart & results reviewed. No acute events overnight. Patient is hemodynamically stable and afebrile this morning  She remained tachycardic tonight and had 1 febrile episode at night. This morning after a short duration of normal heart rate she again became tachycardic. She denies any active symptoms like cough, chest pain and shortness of breath. Infectious disease recommends monitoring off antibiotics and managing with symptomatic treatment. Patient will be kept in today for monitoring as she is still tachycardic and comes in high risk for COVID-19 infection due to her pregnancy. ROS:  Constitutional:  negative for chills, fevers, sweats  Respiratory:  negative for cough, dyspnea on exertion, hemoptysis, shortness of breath, wheezing  Cardiovascular:  negative for chest pain, chest pressure/discomfort, lower extremity edema, palpitations  Gastrointestinal:  negative for abdominal pain, constipation, diarrhea, nausea, vomiting  Neurological:  negative for dizziness, headache  BRIEF HISTORY     The patient is a pleasant 23 y.o. female presented to the ER with a chief complaint of shortness of breath. The patient is 23 weeks pregnant and has a past medical history of genital herpes, chronic hypertension, recurrent miscarriages, chlamydia treated with azithromycin, trichomonas vaginalis, gonorrhea and obesity. Mrs.  This is patient's fourth pregnancy and her previous 3 pregnancies ended in miscarriage. Patient presented with shortness of breath started this morning with associated headache and chest pain. She denies cough and fever. She denies nausea, vomiting and diarrhea. The only sick contact she had in the last couple of days was with her grandmother who had some URI symptoms. On arrival to the ER she was tachypneic with a respiratory rate of 40/min but she was maintaining her saturation on room air. She was normotensive and afebrile with and the initial work-up showed sinus tachycardia on the EKG and negative CT PE. Her COVID-19 test is positive. While in the ER her blood pressure slowly started to drop and it was 100/53 with a heart rate of 118. She received 2 L of fluid in the ER and repeated blood pressure was 117/62. She has been continued on maintenance fluid at 125 mill per. Currently my evaluation the patient is hypertensive with a blood pressure of 99/55 and tachycardic to 130s. She is maintaining a saturation and denies any shortness of breath. On examination no significant findings and her chest is clear. No pedal edema appreciated. Her COVID-19 vaccine is not completed and she only received 1 dose. She has already been seen by OB/GYN and no concern of fetal distress or emergent threat to pregnancy. Patient is still tachycardic and needs to be monitored inpatient. OBJECTIVE     Vital Signs:  /61   Pulse (!) 108   Temp 98.1 °F (36.7 °C) (Oral)   Resp 16   Ht 5' 2\" (1.575 m)   Wt 171 lb (77.6 kg)   LMP 2022 (Exact Date)   SpO2 98%   BMI 31.28 kg/m²     Temp (24hrs), Av.9 °F (37.7 °C), Min:98.1 °F (36.7 °C), Max:102.7 °F (39.3 °C)    In: 298.5   Out: -     Physical Exam:  Constitutional: This is a well developed, well nourished,  23y.o. year old female who is alert, oriented, cooperative and in no apparent distress. Head:normocephalic and atraumatic. EENT:  PERRLA. No conjunctival injections.    Septum was midline, mucosa was 92.2 DUPLICATE ORDER 17.4    DUPLICATE ORDER See Reflexed IPF Result     BMP:   Recent Labs     09/21/22  0910 09/22/22  0648   * 136   K 3.7 3.6*    105   CO2 18* 19*   BUN 7 4*   CREATININE 0.51 0.39*     BNP: No results for input(s): BNP in the last 72 hours. PT/INR:   Recent Labs     09/22/22  0648   PROTIME 9.6   INR 0.9     APTT:   Recent Labs     09/22/22  0648   APTT 29.5     CARDIAC ENZYMES: No results for input(s): CKMB, CKMBINDEX, TROPONINI in the last 72 hours. Invalid input(s): CKTOTAL;3  FASTING LIPID PANEL:  Lab Results   Component Value Date    CHOL 161 04/27/2016    HDL 29 (L) 04/27/2016    TRIG 150 (H) 04/27/2016     LIVER PROFILE:   Recent Labs     09/21/22  0910   AST 14   ALT 12   BILITOT <0.1*   ALKPHOS 87      MICROBIOLOGY:   Lab Results   Component Value Date/Time    CULTURE NO SIGNIFICANT GROWTH 06/01/2022 09:05 PM       Imaging:    XR CHEST PORTABLE    Result Date: 9/21/2022  1. No active pulmonary disease. CT CHEST PULMONARY EMBOLISM W CONTRAST    Result Date: 9/21/2022  1. No evidence of pulmonary embolism or acute pulmonary abnormality. ASSESSMENT & PLAN     ASSESSMENT / PLAN:     IMPRESSION  This is a 23 y.o. female who presented with shortness of breath and found to be COVID-19 infection positive. Patient admitted to inpatient status for monitoring because she is persistently tachycardic and with COVID-19 infection while being pregnant puts her in high risk. COVID-19 infection:  - Presentation with shortness of breath for 1 day  - COVID-19 rapid antigen testing positive  - Chest x-ray and CT shows no acute findings. - Infectious diseases consulted, will follow-up for further recommendations     Pregnancy, 23 weeks gestation  - Currently pregnant and 23 weeks  - History of 3 miscarriages  - OB/GYN consulted, no acute concerns regarding pregnancy and fetal heart rate monitoring normal.  We will continue to follow.      Chronic hypertension  - Patient is on aspirin daily. - Currently patient is hypertensive with tachycardia.   - Monitor blood pressure and heart rate     History of recurrent miscarriages (x3)  - Recurrent miscarriages in first trimester  - Fourth pregnancy currently in 23rd week  - OB/GYN continues to follow-up     Hx of Chlamydia (TOR neg)  - In 03/2022, treated with azithromycin     Hx of Trichomonas vaginalis (TV) infection   - In 02/2022, treated and resolved     Hx of gonorrhea  - In 2021, treated    DVT ppx : Lovenox  GI ppx: Not indicated    PT/OT: Consulted  Discharge Planning / SW: CM to assist with discharge Akilah Emery MD  Internal Medicine Resident, PGY-1  [address], [place].  9/22/2022, 11:19 AM

## 2022-09-22 NOTE — PROGRESS NOTES
Pts boyfriend arrives to the unit and asks why we are observing patient another night. I explained doctors are wanting to monitor her and she had monoclonal antibodies today. Pt now requesting to leave AMA- makenzied Vinay Hernandez  internal med intern.     1737: pt leaves AMA

## 2022-09-22 NOTE — PROGRESS NOTES
[facility] -progress note COVID 19 Patient  Today's Date and Time: 9/22/2022, 10:28 AM    Impression :     COVID 19 Confirmed Infection  Covid tests:  9-21-22: Positive  Unvaccinated individual  Pregnancy 23 weeks of gestation    Recommendations:   Antibiotic treatment:  Monitor off antibiotics  Covid Rx:    Remdesivir. Would an option to decrease risk of Covid> It has been studied. Decadron: Not indicated  Actemra: Not indicated  Monoclonal antibodies: Not formally studied in pregnancy although likely safe based on prior administration to pregnant individuals with Covid without any adverse effects. Of the available options this is the one most likely to help. Medical Decision Making/Summary/Discussion:9/22/2022     Patient admitted with COVID 19 infection    Infection Control Recommendations   [place] Precautions  Airborne isolation  Droplet Isolation  Isolate until 10-1-22    Antimicrobial Stewardship Recommendations     Discontinuation of therapy  Coordination of Outpatient Care:   Estimated Length of IV antimicrobials:TBD  Patient will need Midline Catheter Insertion: TBD  Patient will need PICC line Insertion: No  Patient will need: Home IV , [place],  SNF,  LTAC:TBD  Patient will need outpatient wound care:No    Chief complaint/reason for consultation:   Concern for COVID infection  Pregnancy      History of Present Illness:   Rhett Cisneros is a 23y.o.-year-old  female who was initially admitted on 9/21/2022. Patient seen at the request of Dr. Negrita Buchanan. INITIAL HISTORY:    Patient presented through ER with complaints of onset of shortness of breath. The patient indicates that the symptoms started on 9/21/2022 with shortness of breath, headache, chest pain. She was tested for COVID 19 and her test came back positive on 9/21/2022. She indicates that she has received a single dose of the COVID-vaccine previously.     The patient is into her 23rd week of 157-->    Absolute Neutrophils: 2.42  Absolute Lymphocytes: 0.7  Neutrophil/Lymphocyte Ratio: 3.45    CRP: 28.4  Ferritin:  LDH:     Pro Calcitonin:      Cultures:  Urine:    Blood:    Sputum :    Wound:      CXR:   9-21-22: Normal.  CAT:  9-21-22: Normal. No PE    Discussed with patient, RNBECKY. I have personally reviewed the past medical history, past surgical history, medications, social history, and family history, and I have updated the database accordingly. Past Medical History:     Past Medical History:   Diagnosis Date    BV (bacterial vaginosis)     Chlamydia     3/22; ToR 5/22, 6/22    Chlamydia infection 07/2021    Chlamydia infection affecting pregnancy 3/2022, 7/2021    ToR 5/2022, 6/2022    cHTN (no meds) 6/30/2022    Closed fracture of phalanx of middle finger 2016    Gonorrhea 07/2021    History of blood transfusion     Transfused after SAB 2021    Seasonal allergies     Strep throat     Trichomonas infection 02/2022    Trichomonas vaginitis 02/2021       Past Surgical  History:   No past surgical history on file.     Medications:      aspirin  81 mg Oral Daily    sodium chloride flush  5-40 mL IntraVENous 2 times per day    enoxaparin  40 mg SubCUTAneous Daily    prenatal vitamin plus iron  1 tablet Oral Daily    vitamin B-6  25 mg Oral TID       Social History:     Social History     Socioeconomic History    Marital status: Single     Spouse name: Not on file    Number of children: Not on file    Years of education: Not on file    Highest education level: Not on file   Occupational History    Not on file   Tobacco Use    Smoking status: Never     Passive exposure: Yes    Smokeless tobacco: Never   Vaping Use    Vaping Use: Never used    Passive vaping exposure: Yes   Substance and Sexual Activity    Alcohol use: Not Currently     Comment: Not during pregnancy (last drink summer 2021)    Drug use: Never    Sexual activity: Yes     Partners: Male   Other Topics Concern    Not on file   Social History Narrative    Not on file     Social Determinants of Health     Financial Resource Strain: Medium Risk    Difficulty of Paying Living Expenses: Somewhat hard   Food Insecurity: Food Insecurity Present    Worried About Running Out of Food in the Last Year: Sometimes true    Ran Out of Food in the Last Year: Sometimes true   Transportation Needs: Not on file   Physical Activity: Not on file   Stress: Not on file   Social Connections: Not on file   Intimate Partner Violence: Not on file   Housing Stability: Not on file       Family History:     Family History   Problem Relation Age of Onset    Pancreatic Cancer Father     Arthritis Maternal Grandmother     Heart Attack Maternal Grandmother     Stroke Maternal Grandfather     Diabetes Maternal Grandfather     No Known Problems Paternal Grandfather     No Known Problems Paternal Grandmother     No Known Problems Mother     No Known Problems Brother     No Known Problems Sister         Allergies:   Patient has no known allergies. Review of Systems:       Constitutional: No fevers or chills. No systemic complaints  Head:  Headaches  Eyes: No double vision or blurry vision. No conjunctival inflammation. ENT: No sore throat or runny nose. . No hearing loss, tinnitus or vertigo. Cardiovascular: No chest pain or palpitations. No Shortness of breath. No PAT  Lung: Shortness of breath, no cough. No sputum production  Abdomen: No nausea, vomiting, diarrhea, or abdominal pain. Trula Blew No cramps. Genitourinary: No increased urinary frequency, or dysuria. No hematuria. No suprapubic or CVA pain  Musculoskeletal: No muscle aches or pains. No joint effusions, swelling or deformities  Hematologic: No bleeding or bruising. Neurologic: No headache, weakness, numbness, or tingling. Integument: No rash, no ulcers. Psychiatric: No depression. Endocrine: No polyuria, no polydipsia, no polyphagia.     Physical Examination :   Patient Vitals for the past 8 hrs:   BP Temp Temp src Lkkhdq-Iuciwlmt-Vibzb:       Medical Decision Making-Other:     Note:  Labs, medications, radiologic studies were reviewed with personal review of films  Large amounts of data were reviewed  Discussed with nursing Staff, Discharge planner  Infection Control and Prevention measures reviewed  All prior entries were reviewed  Administer medications as ordered  Prognosis: Guarded  Discharge planning reviewed  Follow up as outpatient. Thank you for allowing us to participate in the care of this patient. Please call with questions. Gustavo Braxton DPM  Podiatric Medicine & Surgery, PGY-1  [address]:    I have discussed the case, including pertinent history and exam findings with the residents and students. I have seen and examined the patient and the key elements of the encounter have been performed by me. I was present when the student obtained his information or examined the patient. I have reviewed the laboratory data, other diagnostic studies and discussed them with the residents. I have updated the medical record where necessary. I agree with the assessment, plan and orders as documented by the resident/ student.     Dossie Cockayne, MD.

## 2022-09-22 NOTE — CARE COORDINATION
09/22/22 3134   Service Assessment   Patient Orientation Alert and Oriented;Person;Place;Situation   Cognition Alert   History Provided By Patient   Primary Caregiver Self   Accompanied By/Relationship no one   Support Systems Parent; Family Members; Other (Comment)  (babies father)   Patient's Healthcare Decision Maker is: Legal Next of Kin   PCP Verified by CM Yes  Saida Stevenson)   Last Visit to PCP Within last year   Prior Functional Level Independent in ADLs/IADLs   Current Functional Level Independent in ADLs/IADLs   Can patient return to prior living arrangement Yes   Ability to make needs known: Good   Financial Resources Medicaid   Social/Functional History   Lives With Other (comment)  (babies father)   Type of [address] One level   Home Access Level entry   [address];[place]   [address] Help From Family; Other (comment)  (babies father)   ADL Assistance Independent   Ambulation Assistance Independent   Transfer Assistance Independent   Active  No   Patient's  Info babies father   Occupation Unemployed   Discharge Planning   Type of [place] Other (Comment)  (babies father)   Current Services Prior To Admission None   Potential Assistance Needed N/A   DME Ordered? No   Potential Assistance Purchasing Medications No   Type of Home Care Services None   Patient expects to be discharged to: House   One/Two Story Residence One story   History of falls? 0   Services At/After Discharge   Transition of Care Consult (CM Consult) Discharge Planning   Mode of Transport at Discharge   (babies father to transport)   Condition of Participation: Discharge Planning   Freedom of Choice list was provided with basic dialogue that supports the patient's individualized plan of care/goals, treatment preferences, and shares the quality data associated with the providers?   Yes Transitional planning-talked with patient on the phone-COVID+, plan is to go home with her babies father, has ride. Verified address, emergency contact, and insurance.

## 2022-09-22 NOTE — PLAN OF CARE
Problem: Discharge Planning  Goal: Discharge to home or other facility with appropriate resources  9/22/2022 0838 by Dia Neri RN  Outcome: Progressing  9/22/2022 0648 by Teressa Pierce  Outcome: Progressing     Problem: Safety - Adult  Goal: Free from fall injury  9/22/2022 0838 by Dia Neri RN  Outcome: Progressing  9/22/2022 0648 by Teressa Pierce  Outcome: Progressing  Flowsheets (Taken 9/21/2022 2000)  Free From Fall Injury: Instruct family/caregiver on patient safety     Problem: ABCDS Injury Assessment  Goal: Absence of physical injury  9/22/2022 0838 by Dia Neri RN  Outcome: Progressing  9/22/2022 0648 by Teressa Pierce  Outcome: Progressing  Flowsheets (Taken 9/21/2022 2000)  Absence of Physical Injury: Implement safety measures based on patient assessment     Problem: Pain  Goal: Verbalizes/displays adequate comfort level or baseline comfort level  9/22/2022 0838 by Dia Neri RN  Outcome: Progressing  9/22/2022 0648 by Teressa Pierce  Outcome: Progressing

## 2022-09-22 NOTE — PLAN OF CARE
Problem: Discharge Planning  Goal: Discharge to home or other facility with appropriate resources  9/22/2022 0648 by Teressa Pierce  Outcome: Progressing  9/21/2022 1811 by Dia Neri RN  Outcome: Progressing  Flowsheets (Taken 9/21/2022 1802)  Discharge to home or other facility with appropriate resources:   Identify barriers to discharge with patient and caregiver   Identify discharge learning needs (meds, wound care, etc)   Refer to discharge planning if patient needs post-hospital services based on physician order or complex needs related to functional status, cognitive ability or social support system   Arrange for needed discharge resources and transportation as appropriate   Arrange for interpreters to assist at discharge as needed     Problem: Safety - Adult  Goal: Free from fall injury  9/22/2022 0648 by MALENA Soria  Outcome: Progressing  Flowsheets (Taken 9/21/2022 2000)  Free From Fall Injury: Instruct family/caregiver on patient safety  9/21/2022 1811 by Dia Neri RN  Outcome: Progressing     Problem: ABCDS Injury Assessment  Goal: Absence of physical injury  9/22/2022 0648 by MALENA Soria  Outcome: Progressing  Flowsheets (Taken 9/21/2022 2000)  Absence of Physical Injury: Implement safety measures based on patient assessment  9/21/2022 1811 by Dia Neri RN  Outcome: Progressing     Problem: Pain  Goal: Verbalizes/displays adequate comfort level or baseline comfort level  Outcome: Progressing

## 2022-09-22 NOTE — PROGRESS NOTES
Obstetric/Gynecology Resident Interval Note    Patient's chart reviewed. Prenatal labs ordered. Continue current management per primary team. Celester Reyna performed 9/21/22 and reassuring. Ob/Gyn available for medication recommendations and plan to perform BSUS on the day of DC to assess fetal status prior to DC. Otherwise, Ob/gyn team will sign off at this time. Paxlovid,  PNV, ASA, Iron sent to the patient's pharmacy. Can consider trending platelets. No concern for PreE at this time given normotensive BP and lack of symptomatology. Rajni Govea, DO  OB/GYN Resident, PGY3  [facility]  9/22/2022, 8:15 AM      Attending Physician Statement  I have personally discussed the care of Marlinkendall Glezon, including pertinent history and exam findings with the resident. I have reviewed and edited their note in the electronic medical record as indicated. The key elements of all parts of the encounter have been performed/reviewed by me. I agree with the assessment, plan and orders as documented by the resident.        Attending's Name:  No Clay MD

## 2022-09-23 ENCOUNTER — CARE COORDINATION (OUTPATIENT)
Dept: CASE MANAGEMENT | Age: 22
End: 2022-09-23

## 2022-09-23 LAB
HGB ELECTROPHORESIS INTERP: NORMAL
PATHOLOGIST: NORMAL

## 2022-09-23 NOTE — CARE COORDINATION
[address] Transitions Initial Follow Up Call #1 -Attempted initial 24 hour transitional call to patient -beeping sound, then disconnected. Left VM on other contact # - request to have patient return call directly to CTN  Patient left AMA    Care Transitions Outreach Attempt - day #1    Call within 2 business days of discharge: Yes   Attempted to reach patient for transitions of care follow up. Unable to reach patient or other listed contact. Patient: Sammie Andres   Patient : 2000   MRN: [id number]    Reason for Admission: COVID (pregnancy - 23 weeks gestation at time of admission) - left AMA  Discharge Date: 22   RARS: Readmission Risk Score: 7.8    Last Discharge [facility]       Date Complaint Diagnosis Description Type Department Provider    22 Shortness of Breath; Chest Pain; Fever COVID-19 ED to Hosp-Admission (Discharged) (ADMITTED) [facility] 5[facility] Rosalva Nathan MD; Asmita Merchant 2741. .. Was this an external facility discharge?  No   Noted following upcoming appointments from discharge chart review:   [address] follow up appointment(s):   Future Appointments   Date Time Provider Suman Maciel   10/10/2022  1:15 PM ULTRASONOGRAPHER 1 Mat F[facility] [address]: [facility]    Non-face-to-face services provided:  Obtained and reviewed discharge summary and/or continuity of care documents      Janina Pierce RN

## 2022-09-23 NOTE — DISCHARGE SUMMARY
[address]     Department of Internal Medicine - Staff Internal Medicine Teaching Service    INPATIENT DISCHARGE SUMMARY      Patient Identification:  Cassidy Gomez is a 23 y.o. female. :  2000  MRN: [id number]     Acct: [de-identified]   PCP: JAM Sandy CNP  Admit Date:  2022  Discharge date and time: 2022  5:38 PM   Attending Provider: No att. providers found                                     [address] Problem Lists:  Principal Problem:    COVID-19 virus infection  Active Problems:    History of recurrent miscarriages (x3), currently pregnant    COVID-19    Hypotension    Tachycardia    23 weeks gestation of pregnancy    Chlamydia (TOR neg)    Trichomonas vaginalis (TV) infection (2022)    Gonorrhea    cHTN (no meds)  Resolved Problems:    * No resolved hospital problems. *      HOSPITAL STAY     Brief Inpatient course:   Cassidy Gomez is a 23 y.o. female who was admitted for the management of COVID-19 virus infection, presented to the emergency department with chief complaint of shortness of breath. The patient is 23 weeks pregnant and has a past medical history of genital herpes, chronic hypertension, recurrent miscarriages, chlamydia treated with azithromycin, trichomonas vaginalis, gonorrhea and obesity. This is patient's fourth pregnancy and her previous 3 pregnancies ended in miscarriage. Patient presented with shortness of breath started this morning with associated headache and chest pain. She denies cough and fever. She denies nausea, vomiting and diarrhea. The only sick contact she had in the last couple of days was with her grandmother who had some URI symptoms. On arrival to the ER she was tachypneic with a respiratory rate of 40/min but she was maintaining her saturation on room air.   She was normotensive and afebrile with and the initial work-up showed sinus tachycardia on the EKG and negative CT PE.  Her COVID-19 test is positive. While in the ER her blood pressure slowly started to drop and it was 100/53 with a heart rate of 118. She received 2 L of fluid in the ER and repeated blood pressure was 117/62. She has been continued on maintenance fluid at 125 mill per. She remained persistently tachycardic to 130s. She is maintained a saturation. On examination no significant findings and her chest is clear. No pedal edema appreciated. Her COVID-19 vaccine is not completed and she only received 1 dose. She had already been seen by OB/GYN and no concern of fetal distress or emergent threat to pregnancy. She remained tachycardic and had febrile episode overnight. She left AGAINST MEDICAL ADVICE in the morning. Procedures/ Significant Interventions:    None    Consults:     Consults:     Final Specialist Recommendations/Findings:   IP CONSULT TO OB GYN  IP CONSULT TO INTERNAL MEDICINE  IP CONSULT TO IV TEAM  IP CONSULT TO CASE MANAGEMENT  IP CONSULT TO INFECTIOUS DISEASES  PHARMACY TO DOSE MEDICATION      Any Hospital Acquired Infections: none    Discharge Functional Status:  stable    DISCHARGE PLAN     Disposition: Left AMA    Patient Instructions:   Left AMA  Activity: Left AMA    Diet: None recommended as left AMA    Follow-up:    No follow-up provider specified.     Patient Instructions: Patient left AMA  Follow up labs: none  Follow up imaging: none    Note that over 30 minutes was spent in preparing discharge papers, discussing discharge with patient, medication review, etc.      Chema Ricketts MD, MD  Internal Medicine Resident, PGY-1  [address], [place].  9/23/2022, 2:03 PM

## 2022-09-23 NOTE — PROGRESS NOTES
CLINICAL PHARMACY NOTE: MEDS TO BEDS    Total # of Prescriptions Filled: 0   The following medications were delivered to the patient:0  See notes    Additional Documentation: I was told patient was discharged without medications on 09/22/22. I tried multiple time to call patient and let her know we have medication for her phone was busy.

## 2022-09-26 ENCOUNTER — CARE COORDINATION (OUTPATIENT)
Dept: CASE MANAGEMENT | Age: 22
End: 2022-09-26

## 2022-09-26 LAB
EKG ATRIAL RATE: 130 BPM
EKG P AXIS: 64 DEGREES
EKG P-R INTERVAL: 126 MS
EKG Q-T INTERVAL: 302 MS
EKG QRS DURATION: 84 MS
EKG QTC CALCULATION (BAZETT): 444 MS
EKG R AXIS: 54 DEGREES
EKG T AXIS: 41 DEGREES
EKG VENTRICULAR RATE: 130 BPM

## 2022-09-28 LAB — CYSTIC FIBROSIS: NORMAL

## 2022-10-10 ENCOUNTER — ROUTINE PRENATAL (OUTPATIENT)
Dept: PERINATAL CARE | Age: 22
End: 2022-10-10
Payer: COMMERCIAL

## 2022-10-10 VITALS
SYSTOLIC BLOOD PRESSURE: 116 MMHG | WEIGHT: 179 LBS | BODY MASS INDEX: 32.94 KG/M2 | HEART RATE: 90 BPM | TEMPERATURE: 98.4 F | HEIGHT: 62 IN | DIASTOLIC BLOOD PRESSURE: 75 MMHG | RESPIRATION RATE: 16 BRPM

## 2022-10-10 DIAGNOSIS — Z36.4 ULTRASOUND FOR ANTENATAL SCREENING FOR FETAL GROWTH RESTRICTION: ICD-10-CM

## 2022-10-10 DIAGNOSIS — O99.212 OBESITY AFFECTING PREGNANCY IN SECOND TRIMESTER: ICD-10-CM

## 2022-10-10 DIAGNOSIS — Z03.72 SUSPECTED PLACENTAL PROBLEM NOT FOUND: ICD-10-CM

## 2022-10-10 DIAGNOSIS — Z3A.26 26 WEEKS GESTATION OF PREGNANCY: ICD-10-CM

## 2022-10-10 DIAGNOSIS — O35.07X0 FETAL MICROCEPHALY AFFECTING ANTEPARTUM CARE OF MOTHER, SINGLE OR UNSPECIFIED FETUS: ICD-10-CM

## 2022-10-10 DIAGNOSIS — O44.40 LOW IMPLANTATION OF PLACENTA WITHOUT HEMORRHAGE: ICD-10-CM

## 2022-10-10 DIAGNOSIS — O26.20 RECURRENT PREGNANCY LOSS, ANTEPARTUM CONDITION OR COMPLICATION: ICD-10-CM

## 2022-10-10 DIAGNOSIS — O16.2 HYPERTENSION AFFECTING PREGNANCY IN SECOND TRIMESTER: Primary | ICD-10-CM

## 2022-10-10 PROCEDURE — 76817 TRANSVAGINAL US OBSTETRIC: CPT | Performed by: OBSTETRICS & GYNECOLOGY

## 2022-10-10 PROCEDURE — 99999 PR OFFICE/OUTPT VISIT,PROCEDURE ONLY: CPT | Performed by: OBSTETRICS & GYNECOLOGY

## 2022-10-10 PROCEDURE — 76816 OB US FOLLOW-UP PER FETUS: CPT | Performed by: OBSTETRICS & GYNECOLOGY

## 2022-10-10 PROCEDURE — 76820 UMBILICAL ARTERY ECHO: CPT | Performed by: OBSTETRICS & GYNECOLOGY

## 2022-10-10 NOTE — PROGRESS NOTES
Maternal-Fetal Medicine consultation scheduled for the maternal/fetal medical/obstetrical complications of pregnancy (Fetal microcephaly/small fetal head biometric parameters).

## 2022-10-18 ENCOUNTER — TELEPHONE (OUTPATIENT)
Dept: OBGYN | Age: 22
End: 2022-10-18

## 2022-10-18 NOTE — TELEPHONE ENCOUNTER
Patient no showed for 10/17/22 appointment at [address] [address] office. Writer sent a letter for patient to call the office to reschedule appointment.

## 2022-11-07 DIAGNOSIS — O35.07X0 FETAL MICROCEPHALY: Primary | ICD-10-CM

## 2022-11-09 ENCOUNTER — HOSPITAL ENCOUNTER (OUTPATIENT)
Age: 22
Discharge: HOME | End: 2022-11-09
Attending: OBSTETRICS & GYNECOLOGY | Admitting: OBSTETRICS & GYNECOLOGY
Payer: COMMERCIAL

## 2022-11-09 ENCOUNTER — ROUTINE PRENATAL (OUTPATIENT)
Dept: PERINATAL CARE | Age: 22
End: 2022-11-09
Payer: COMMERCIAL

## 2022-11-09 VITALS
OXYGEN SATURATION: 99 % | SYSTOLIC BLOOD PRESSURE: 129 MMHG | DIASTOLIC BLOOD PRESSURE: 62 MMHG | RESPIRATION RATE: 16 BRPM | HEART RATE: 93 BPM | TEMPERATURE: 97.9 F

## 2022-11-09 VITALS
HEIGHT: 62 IN | DIASTOLIC BLOOD PRESSURE: 77 MMHG | TEMPERATURE: 97.9 F | BODY MASS INDEX: 33.13 KG/M2 | RESPIRATION RATE: 16 BRPM | SYSTOLIC BLOOD PRESSURE: 134 MMHG | HEART RATE: 99 BPM | WEIGHT: 180 LBS

## 2022-11-09 DIAGNOSIS — O41.93X0 ABNORMAL AMNIOTIC FLUID IN THIRD TRIMESTER, SINGLE OR UNSPECIFIED FETUS: ICD-10-CM

## 2022-11-09 DIAGNOSIS — Z3A.30 30 WEEKS GESTATION OF PREGNANCY: ICD-10-CM

## 2022-11-09 DIAGNOSIS — O16.3 HYPERTENSION AFFECTING PREGNANCY IN THIRD TRIMESTER: ICD-10-CM

## 2022-11-09 DIAGNOSIS — O35.07X0 FETAL MICROCEPHALY AFFECTING ANTEPARTUM CARE OF MOTHER, SINGLE OR UNSPECIFIED FETUS: Primary | ICD-10-CM

## 2022-11-09 DIAGNOSIS — Z36.4 ULTRASOUND FOR ANTENATAL SCREENING FOR FETAL GROWTH RESTRICTION: ICD-10-CM

## 2022-11-09 DIAGNOSIS — O99.213 OBESITY AFFECTING PREGNANCY IN THIRD TRIMESTER: ICD-10-CM

## 2022-11-09 DIAGNOSIS — Z13.89 ENCOUNTER FOR ROUTINE SCREENING FOR MALFORMATION USING ULTRASONICS: ICD-10-CM

## 2022-11-09 DIAGNOSIS — O43.103 PLACENTAL ABNORMALITY IN THIRD TRIMESTER: ICD-10-CM

## 2022-11-09 PROBLEM — Z3A.23 23 WEEKS GESTATION OF PREGNANCY: Status: RESOLVED | Noted: 2022-09-22 | Resolved: 2022-11-09

## 2022-11-09 PROBLEM — A59.01 TRICHOMONAS VAGINALIS (TV) INFECTION: Status: RESOLVED | Noted: 2022-06-16 | Resolved: 2022-11-09

## 2022-11-09 LAB
ABDOMINAL CIRCUMFERENCE: NORMAL
BACTERIA: ABNORMAL
BILIRUBIN URINE: NEGATIVE
BIPARIETAL DIAMETER: NORMAL
CANDIDA SPECIES, DNA PROBE: NEGATIVE
COLOR: YELLOW
EPITHELIAL CELLS UA: ABNORMAL /HPF (ref 0–5)
ESTIMATED FETAL WEIGHT: NORMAL
FEMORAL DIAMETER: NORMAL
GARDNERELLA VAGINALIS, DNA PROBE: POSITIVE
GLUCOSE URINE: NEGATIVE
HC/AC: NORMAL
HEAD CIRCUMFERENCE: NORMAL
KETONES, URINE: ABNORMAL
LEUKOCYTE ESTERASE, URINE: NEGATIVE
MUCUS: ABNORMAL
NITRITE, URINE: NEGATIVE
PH UA: 6 (ref 5–8)
PROTEIN UA: ABNORMAL
RBC UA: ABNORMAL /HPF (ref 0–2)
SOURCE: ABNORMAL
SPECIFIC GRAVITY UA: 1.03 (ref 1–1.03)
TRICHOMONAS VAGINALIS DNA: NEGATIVE
TURBIDITY: ABNORMAL
URINE HGB: NEGATIVE
UROBILINOGEN, URINE: NORMAL
WBC UA: ABNORMAL /HPF (ref 0–5)

## 2022-11-09 PROCEDURE — 99213 OFFICE O/P EST LOW 20 MIN: CPT

## 2022-11-09 PROCEDURE — 87491 CHLMYD TRACH DNA AMP PROBE: CPT

## 2022-11-09 PROCEDURE — 87660 TRICHOMONAS VAGIN DIR PROBE: CPT

## 2022-11-09 PROCEDURE — G8417 CALC BMI ABV UP PARAM F/U: HCPCS | Performed by: OBSTETRICS & GYNECOLOGY

## 2022-11-09 PROCEDURE — 76805 OB US >/= 14 WKS SNGL FETUS: CPT | Performed by: OBSTETRICS & GYNECOLOGY

## 2022-11-09 PROCEDURE — G8484 FLU IMMUNIZE NO ADMIN: HCPCS | Performed by: OBSTETRICS & GYNECOLOGY

## 2022-11-09 PROCEDURE — 81001 URINALYSIS AUTO W/SCOPE: CPT

## 2022-11-09 PROCEDURE — 87591 N.GONORRHOEAE DNA AMP PROB: CPT

## 2022-11-09 PROCEDURE — G8427 DOCREV CUR MEDS BY ELIG CLIN: HCPCS | Performed by: OBSTETRICS & GYNECOLOGY

## 2022-11-09 PROCEDURE — 87510 GARDNER VAG DNA DIR PROBE: CPT

## 2022-11-09 PROCEDURE — 87480 CANDIDA DNA DIR PROBE: CPT

## 2022-11-09 PROCEDURE — 6370000000 HC RX 637 (ALT 250 FOR IP)

## 2022-11-09 PROCEDURE — 76821 MIDDLE CEREBRAL ARTERY ECHO: CPT | Performed by: OBSTETRICS & GYNECOLOGY

## 2022-11-09 PROCEDURE — 99243 OFF/OP CNSLTJ NEW/EST LOW 30: CPT | Performed by: OBSTETRICS & GYNECOLOGY

## 2022-11-09 PROCEDURE — 83986 ASSAY PH BODY FLUID NOS: CPT

## 2022-11-09 PROCEDURE — 76819 FETAL BIOPHYS PROFIL W/O NST: CPT | Performed by: OBSTETRICS & GYNECOLOGY

## 2022-11-09 PROCEDURE — 76820 UMBILICAL ARTERY ECHO: CPT | Performed by: OBSTETRICS & GYNECOLOGY

## 2022-11-09 RX ORDER — ONDANSETRON 2 MG/ML
4 INJECTION INTRAMUSCULAR; INTRAVENOUS EVERY 6 HOURS PRN
Status: DISCONTINUED | OUTPATIENT
Start: 2022-11-09 | End: 2022-11-10 | Stop reason: HOSPADM

## 2022-11-09 RX ORDER — METRONIDAZOLE 500 MG/1
500 TABLET ORAL 2 TIMES DAILY
Qty: 14 TABLET | Refills: 0 | Status: SHIPPED | OUTPATIENT
Start: 2022-11-09 | End: 2022-11-16

## 2022-11-09 RX ORDER — ONDANSETRON 4 MG/1
4 TABLET, ORALLY DISINTEGRATING ORAL EVERY 8 HOURS PRN
Status: DISCONTINUED | OUTPATIENT
Start: 2022-11-09 | End: 2022-11-10 | Stop reason: HOSPADM

## 2022-11-09 RX ORDER — ACETAMINOPHEN 500 MG
1000 TABLET ORAL EVERY 6 HOURS PRN
Status: DISCONTINUED | OUTPATIENT
Start: 2022-11-09 | End: 2022-11-10 | Stop reason: HOSPADM

## 2022-11-09 RX ADMIN — ACETAMINOPHEN 1000 MG: 500 TABLET ORAL at 19:41

## 2022-11-09 ASSESSMENT — PAIN SCALES - GENERAL: PAINLEVEL_OUTOF10: 4

## 2022-11-09 ASSESSMENT — PAIN DESCRIPTION - ORIENTATION: ORIENTATION: RIGHT

## 2022-11-09 ASSESSMENT — PAIN DESCRIPTION - LOCATION: LOCATION: ABDOMEN

## 2022-11-10 PROBLEM — N76.0 BV (BACTERIAL VAGINOSIS): Status: ACTIVE | Noted: 2022-11-10

## 2022-11-10 PROBLEM — B96.89 BV (BACTERIAL VAGINOSIS): Status: ACTIVE | Noted: 2022-11-10

## 2022-11-10 LAB
C TRACH DNA GENITAL QL NAA+PROBE: NEGATIVE
FERN TESTING (POC): NORMAL
N. GONORRHOEAE DNA: NEGATIVE
NITRAZINE PH (POC): NEGATIVE
SPECIMEN DESCRIPTION: NORMAL

## 2022-11-10 NOTE — H&P
OBSTETRICAL HISTORY [facility]    Date: 2022       Time: 8:47 PM   Patient Name: Luis Breen     Patient : 2000  Room/Bed: [facility]-    Admission Date/Time: 2022  6:39 PM      CC: Loss of Fluid     HPI: Luis Breen is a 23 y.o.  at 30w5d who presents for loss of fluid. She states she was sitting in bed previously and felt a gush of fluid that soaked through her underwear. The patient reports fetal movement is present, denies contractions, complains of loss of fluid, denies vaginal bleeding. Patient denies headache, vision changes, nausea, vomiting, fever, chills, shortness of breath, chest pain, RUQ pain, abdominal pain, diarrhea, change in color/amount/odor of vaginal discharge, dysuria or, hematuria. DATING:  LMP: Patient's last menstrual period was 2022 (exact date).   Estimated Date of Delivery: 23   Based on: LMP confirmed with U/S, at 8 6/7 weeks GA    PREGNANCY RISK FACTORS:  Patient Active Problem List   Diagnosis    History of recurrent miscarriages (x3), currently pregnant    Chlamydia (TOR neg)    Remote Hx STI    Exposure to genital herpes (current partner)    High-risk pregnancy    Obesity (BMI 30.17) affecting pregnancy in first trimester    COVID-19 vaccine series not completed    cHTN (no meds)    COVID-19    Hypotension    Tachycardia    COVID-19 virus infection    30 weeks gestation of pregnancy    Microcephaly of fetus        Steroids Given In This Pregnancy:  no     REVIEW OF SYSTEMS:   Constitutional: negative fever, negative chills, negative weight changes   HEENT: negative visual disturbances, negative headaches, negative dizziness, negative hearing loss  Breast: Negative breast abnormalities, negative breast lumps, negative nipple discharge  Respiratory: negative dyspnea, negative cough, negative SOB  Cardiovascular: negative chest pain,  negative palpitations, negative arrhythmia, negative syncope   Gastrointestinal: negative abdominal pain, negative RUQ pain, negative N/V, negative diarrhea, negative constipation, negative bowel changes, negative heartburn   Genitourinary: negative dysuria, negative hematuria, negative urinary incontinence, negative vaginal discharge, negative vaginal bleeding or spotting  Dermatological: negative rash, negative pruritis, negative mole or other skin changes  Hematologic: negative bruising  Immunologic/Lymphatic: negative recent illness, negative recent sick contact  Musculoskeletal: negative back pain, negative myalgias, negative arthralgias  Neurological:  negative dizziness, negative migraines, negative seizures, negative weakness  Behavior/Psych: negative depression, negative anxiety, negative SI, negative HI    OBSTETRIC HISTORY:   OB History    Para Term  AB Living   4 0 0 0 3 0   SAB IAB Ectopic Molar Multiple Live Births   3 0 0 0 0 0      # Outcome Date GA Lbr Moiz/2nd Weight Sex Delivery Anes PTL Lv   4 Current            3 2021     SAB      2 2021     SAB      1 2020              Obstetric Comments   G1-G4: FOB#1       PAST MEDICAL HISTORY:   has a past medical history of BV (bacterial vaginosis), Chlamydia, Chlamydia infection, Chlamydia infection affecting pregnancy, cHTN (no meds), Closed fracture of phalanx of middle finger, Gonorrhea, History of blood transfusion, Seasonal allergies, Strep throat, Trichomonas infection, and Trichomonas vaginitis. PAST SURGICAL HISTORY:  None    ALLERGIES:  has No Known Allergies. MEDICATIONS:  Prior to Admission medications    Medication Sig Start Date End Date Taking?  Authorizing Provider   ferrous sulfate (FE TABS) 325 (65 Fe) MG EC tablet Take 1 tablet by mouth 2 times daily 22   Fartun Ceron DO   ondansetron (ZOFRAN) 4 MG tablet Take 1 tablet by mouth daily as needed for Nausea or Vomiting  Patient not taking: Reported on 2022   Randal Russ DO   aspirin EC 81 MG EC tablet Take 1 tablet by mouth daily 6/30/22   Judy Taveras DO   vitamin B-6 (PYRIDOXINE) 50 MG tablet Take 0.5 tablets by mouth in the morning, at noon, and at bedtime 6/1/22   Deonte Velazco MD   Prenatal Vit-Fe Fumarate-FA (PRENATAL VITAMIN) 27-0.8 MG TABS Take 1 tablet by mouth daily 5/4/22   Cristino Sacks, MD       FAMILY HISTORY:  family history includes Arthritis in her maternal grandmother; Diabetes in her maternal grandfather; Heart Attack in her maternal grandmother; No Known Problems in her brother, mother, paternal grandfather, paternal grandmother, and sister; Pancreatic Cancer in her father; Stroke in her maternal grandfather. SOCIAL HISTORY:   reports that she has never smoked. She has been exposed to tobacco smoke. She has never used smokeless tobacco. She reports that she does not currently use alcohol. She reports that she does not use drugs.     VITALS:  Vitals:    11/09/22 1902 11/09/22 1903   BP: (P) 128/68 129/62   Pulse: 98 93   Resp: 16 16   Temp: 97.9 °F (36.6 °C)    TempSrc: Oral    SpO2: 99%         PHYSICAL EXAM:  Fetal Heart Monitor:  Baseline Heart Rate 140, moderate variability, present accelerations, absent decelerations  [place]: no contractions    General appearance:  no apparent distress, alert, and cooperative  HEENT: head atraumatic, normocephalic, moist mucous membranes, trachea midline  Neurologic:  alert, oriented, normal speech, no focal findings or movement disorder noted  Lungs:  No increased work of breathing, good air exchange, clear to auscultation bilaterally, no crackles or wheezing  Heart:  regular rate and rhythm and no murmur    Abdomen:  soft, gravid, and non-tender  Extremities:  no calf tenderness, non edematous  Musculoskeletal: Gross strength equal and intact throughout, no gross abnormalities, range of motion normal in hips, knees, shoulders and spine  Psychiatric: Mood appropriate, normal affect   Rectal Exam: not indicated  Pelvic Exam: Chaperone for Intimate Exam: Chaperone was present for entire exam, Chaperone Name: Wes Smith RN  Sterile Speculum Exam:   Urethral meatus: normal appearing   Vulva: Normal hair distribution, normal appearing vulva, no masses, tenderness or lesions, normal clitoris   Vagina: Normal appearing vaginal mucosa without lesions, white vaginal discharge noted in the posterior vault, no lacerations   Cervix: Normal appearing cervix without lesions, external os visibly closed, no lacerations or abnormal lesions visualized    DATA:  Membranes Ruptured: No  Fern: negative  Nitrazine: Negative  Valsalva/Pooling: absent  Vaginal Bleeding: absent      PRENATAL LAB RESULTS:   Blood Type/Rh: O pos  Antibody Screen: negative  Hemoglobin, Hematocrit, Platelets: 48.9/01.1/545  Rubella: immune  T. Pallidum, IgG: non-reactive  Hepatitis B Surface Antigen: non-reactive   Hepatitis C Antibody: non-reactive   HIV: non-reactive   Gonorrhea: negative  Chlamydia: negative  Urine culture: negative, date: 22    1 hour Glucose Tolerance Test: not done    Group B Strep: not done  Cystic Fibrosis Screen: negative  Sickle Cell Screen: negative  First Trimester Screen: low risk for aneuploidy  QUAD: not done  MSAFP: not done  Non-Invasive Prenatal Testing: not done  Anatomy US: anterior placenta, 3VC, male gender, normal fetal anatomy    ASSESSMENT & PLAN:  Stefanie Perez is a 23 y.o. female  at [address] [place] [address] who presents with LOF   - GBS unknown / Rh positive / R immune   - No indication for GBS prophylaxis unless delivery imminent   - VSS, BP normotensive   - cEFM/TOCO: cat 1   - Nitrazine/Pooling/Valsalva/Ferning all negative   - Low clinical suspicion for ROM at this time   - Copious vaginal discharge noted in vaginal vault   - GCC/Vaginitis/UA ordered   - Vaginitis + for BV, UA unremarkable. GCC pending.  Will update mychart if positive    - Patient desire discharge home at this time and rx for flagyl sent to her pharmacy   - Patient counseled and given strict return precautions  Patient stable for discharge home. Patient counseled on  labor precautions, PO hydration, and use of tylenol PRN. Counseled on signs and symptoms of PreE. All questions and concerns were addressed and patient expressed understanding.  Patient advised to follow up in the office for next appointment on 11/15/22    cHTN (no meds)   - Patient's BP normotensive   - Denies s/s of PreE   - PreE labs wnl (22), P/C 0.14 (22)   - Of note, patient's platelet reflexed to IFP showing platelet count of 776   - Continue to monitor    Microcephaly   - MFM scan notes HC/BPD < 3%tile most recent today 22   - Viral labs sent   - Continue to monitor   - [facility] recommends weekly  surveillance q 1 week starting 3 weeks     Genital Herpes   - Patient denies s/s at this time   - Will need valtrex suppression starting 36 weeks   - Continue to monitor    Covid 19 infection in pregnancy   - Patient tested positive on 22   - Denies s/s at this time    Chlamydia in Pregnancy   - Tested + for chlamydia on 3/30/22.   - TOR negative on 22 and 22   - Patient denies s/s at this time    Hx of SAB x3   - Patient has labs ordered for thrombophilia workup given past history of SAB x3   - MF also recommends maternal karyotype   - Labs not yet drawn    BMI 32          Patient Active Problem List    Diagnosis Date Noted    30 weeks gestation of pregnancy 2022     Priority: Medium    Microcephaly of fetus 2022     Priority: Medium    COVID-19 virus infection 2022     Priority: Medium     Positive 22      COVID-19 2022     Priority: Medium    Hypotension 2022     Priority: Medium    Tachycardia 2022     Priority: Medium    History of recurrent miscarriages (x3), currently pregnant 2022     Priority: Medium     2020: SAB  : SAB  : SAB      Exposure to genital herpes (current partner) 2022     Priority: Medium cHTN (no meds) 2022     /91 on   139/93 on       Chlamydia (TOR neg) 2022     3/2022: Treated with Azithromycin  TOR- see labs      Remote Hx STI 2022     Trichomonas, gonorrhea, chlamydia      High-risk pregnancy 2022     Risk factors: Obesity (BMI 30.17), chlamydia in pregnancy, COVID vaccine series not completed, recurrent miscarriage (x3)      Obesity (BMI 30.17) affecting pregnancy in first trimester 2022     Early one-hour glucose tolerance test ordered per protocol      COVID-19 vaccine series not completed 2022     Pt counseled on the the risks of not getting vaccinated in pregnancy against COVID-19. Pregnant women with symptomatic covid have a 70% increased risk of death. Covid-19 during pregnancy  increases the risk for adverse outcomes, including  birth and admission of the baby to an intensive care unit. Plan discussed with Dr. eNlla Palma, who is agreeable. Steroids given this admission: No    Risks, benefits, alternatives and possible complications have been discussed in detail with the patient. Admission, and post admission procedures and expectations were discussed in detail. All questions were answered. Attending's Name: Dr. Elle Aquino MD  Ob/Gyn Resident  2022, 8:47 PM    Date: 11/10/2022  Time: 7:11 AM      Patient Name: Mi Berry  Patient : 2000  Room/Bed: [place] [address]  Admission Date/Time: 2022  6:39 PM        Attending Physician Statement  I have personally seen, evaluated and discussed the care of Mi Berry, including pertinent history and exam findings with the resident. I have reviewed and edited their note in the electronic medical record. The key elements of all parts of the encounter have been performed/reviewed by me. I agree with the assessment, plan and orders as documented by the resident.      The level of care submitted represents to the best of my ability the care documented in the medical record today. GC Modifier. This service has been performed in part by a resident under the direction of a teaching physician.         Attending's Name:  Jordi Quiroz MD

## 2022-11-10 NOTE — DISCHARGE INSTRUCTIONS
Diet:   Regular            Increase Fluids  8-10 glasses/day    Activities:    As tolerated          Resume previous activity                                  Additional Instructions:  Notify Physician    If any of the following                                   **Spots before eyes or blurred vision                      ** Intermittent low backache  **Dizziness or severe Headache                                 **  Vomiting or diarrhea for several hours  **Chills & or fever                                                      **Decrease or absence of baby movement  **Vaginal pressure                                                       **Epigastric pain                                                                                                                     **  Right sided abdominal pain  **Uterine contractions are regular & 5 min.  Apart           **Increase or change in vaginal discharge  **Bag or roe leaking or gush of fluid from vagina     **Abdominal or menstrual like cramping that is constant or comes and goes  **Any vaginal bleeding that is heavier than a menstrual period  **Swelling of face, hands, legs or feet not decreased with rest on left side

## 2022-11-10 NOTE — FLOWSHEET NOTE
Patient discharged ambulatory to home with instructions. Prescriptions sent to pharmacy. Labor precautions reviewed. Patient verbalizes understanding.

## 2022-11-10 NOTE — FLOWSHEET NOTE
RN at bedside with resident. Positioned pt for SSE done by Dr. Deepak Cali, no pooling of water noted, vaginal swab collected, nitrazine test done with negative result, Dr. Deepak Cail explained procedure/findings with the pt. Pt tolerated procedure well.

## 2022-12-05 ENCOUNTER — ROUTINE PRENATAL (OUTPATIENT)
Dept: PERINATAL CARE | Age: 22
End: 2022-12-05
Payer: COMMERCIAL

## 2022-12-05 VITALS
TEMPERATURE: 98.6 F | HEIGHT: 62 IN | RESPIRATION RATE: 16 BRPM | SYSTOLIC BLOOD PRESSURE: 122 MMHG | HEART RATE: 85 BPM | DIASTOLIC BLOOD PRESSURE: 83 MMHG | WEIGHT: 189.38 LBS | BODY MASS INDEX: 34.85 KG/M2

## 2022-12-05 DIAGNOSIS — Z36.4 ULTRASOUND FOR ANTENATAL SCREENING FOR FETAL GROWTH RESTRICTION: ICD-10-CM

## 2022-12-05 DIAGNOSIS — O36.5930 INTRAUTERINE GROWTH RESTRICTION (IUGR) AFFECTING CARE OF MOTHER, THIRD TRIMESTER, SINGLE GESTATION: Primary | ICD-10-CM

## 2022-12-05 DIAGNOSIS — O16.3 HYPERTENSION AFFECTING PREGNANCY IN THIRD TRIMESTER: ICD-10-CM

## 2022-12-05 DIAGNOSIS — O41.93X0 ABNORMAL AMNIOTIC FLUID IN THIRD TRIMESTER, SINGLE OR UNSPECIFIED FETUS: ICD-10-CM

## 2022-12-05 DIAGNOSIS — O43.103 PLACENTAL ABNORMALITY IN THIRD TRIMESTER: ICD-10-CM

## 2022-12-05 DIAGNOSIS — O99.213 OBESITY AFFECTING PREGNANCY IN THIRD TRIMESTER: ICD-10-CM

## 2022-12-05 DIAGNOSIS — Z3A.34 34 WEEKS GESTATION OF PREGNANCY: ICD-10-CM

## 2022-12-05 PROCEDURE — 76819 FETAL BIOPHYS PROFIL W/O NST: CPT | Performed by: OBSTETRICS & GYNECOLOGY

## 2022-12-05 PROCEDURE — 76821 MIDDLE CEREBRAL ARTERY ECHO: CPT | Performed by: OBSTETRICS & GYNECOLOGY

## 2022-12-05 PROCEDURE — 99999 PR OFFICE/OUTPT VISIT,PROCEDURE ONLY: CPT | Performed by: OBSTETRICS & GYNECOLOGY

## 2022-12-05 PROCEDURE — 76820 UMBILICAL ARTERY ECHO: CPT | Performed by: OBSTETRICS & GYNECOLOGY

## 2022-12-05 PROCEDURE — 76816 OB US FOLLOW-UP PER FETUS: CPT | Performed by: OBSTETRICS & GYNECOLOGY

## 2022-12-06 LAB
ABDOMINAL CIRCUMFERENCE: NORMAL
BIPARIETAL DIAMETER: NORMAL
ESTIMATED FETAL WEIGHT: NORMAL
FEMORAL DIAMETER: NORMAL
HC/AC: NORMAL
HEAD CIRCUMFERENCE: NORMAL

## 2022-12-12 ENCOUNTER — ROUTINE PRENATAL (OUTPATIENT)
Dept: PERINATAL CARE | Age: 22
End: 2022-12-12
Payer: COMMERCIAL

## 2022-12-12 VITALS
HEART RATE: 80 BPM | SYSTOLIC BLOOD PRESSURE: 122 MMHG | HEIGHT: 62 IN | TEMPERATURE: 97.9 F | BODY MASS INDEX: 34.23 KG/M2 | RESPIRATION RATE: 16 BRPM | WEIGHT: 186 LBS | DIASTOLIC BLOOD PRESSURE: 76 MMHG

## 2022-12-12 DIAGNOSIS — O99.213 OBESITY AFFECTING PREGNANCY IN THIRD TRIMESTER: ICD-10-CM

## 2022-12-12 DIAGNOSIS — O16.3 HYPERTENSION AFFECTING PREGNANCY IN THIRD TRIMESTER: ICD-10-CM

## 2022-12-12 DIAGNOSIS — O41.93X0 ABNORMAL AMNIOTIC FLUID IN THIRD TRIMESTER, SINGLE OR UNSPECIFIED FETUS: ICD-10-CM

## 2022-12-12 DIAGNOSIS — O36.5930 INTRAUTERINE GROWTH RESTRICTION (IUGR) AFFECTING CARE OF MOTHER, THIRD TRIMESTER, SINGLE GESTATION: Primary | ICD-10-CM

## 2022-12-12 DIAGNOSIS — O43.103 PLACENTAL ABNORMALITY IN THIRD TRIMESTER: ICD-10-CM

## 2022-12-12 DIAGNOSIS — Z3A.35 35 WEEKS GESTATION OF PREGNANCY: ICD-10-CM

## 2022-12-12 PROCEDURE — 76820 UMBILICAL ARTERY ECHO: CPT | Performed by: OBSTETRICS & GYNECOLOGY

## 2022-12-12 PROCEDURE — 76821 MIDDLE CEREBRAL ARTERY ECHO: CPT | Performed by: OBSTETRICS & GYNECOLOGY

## 2022-12-12 PROCEDURE — 99999 PR OFFICE/OUTPT VISIT,PROCEDURE ONLY: CPT | Performed by: OBSTETRICS & GYNECOLOGY

## 2022-12-12 PROCEDURE — 76819 FETAL BIOPHYS PROFIL W/O NST: CPT | Performed by: OBSTETRICS & GYNECOLOGY

## 2022-12-12 PROCEDURE — 76815 OB US LIMITED FETUS(S): CPT | Performed by: OBSTETRICS & GYNECOLOGY

## 2022-12-16 ENCOUNTER — HOSPITAL ENCOUNTER (OUTPATIENT)
Age: 22
Discharge: HOME HEALTH | End: 2022-12-16
Attending: STUDENT IN AN ORGANIZED HEALTH CARE EDUCATION/TRAINING PROGRAM | Admitting: STUDENT IN AN ORGANIZED HEALTH CARE EDUCATION/TRAINING PROGRAM
Payer: COMMERCIAL

## 2022-12-16 VITALS
HEART RATE: 94 BPM | DIASTOLIC BLOOD PRESSURE: 82 MMHG | TEMPERATURE: 98.2 F | SYSTOLIC BLOOD PRESSURE: 132 MMHG | OXYGEN SATURATION: 100 % | RESPIRATION RATE: 14 BRPM

## 2022-12-16 PROBLEM — Z3A.36 36 WEEKS GESTATION OF PREGNANCY: Status: ACTIVE | Noted: 2022-12-16

## 2022-12-16 LAB
ABSOLUTE EOS #: 0.03 K/UL (ref 0–0.44)
ABSOLUTE IMMATURE GRANULOCYTE: 0.05 K/UL (ref 0–0.3)
ABSOLUTE LYMPH #: 3.05 K/UL (ref 1.2–5.2)
ABSOLUTE MONO #: 0.91 K/UL (ref 0.1–1.4)
ALBUMIN SERPL-MCNC: 3 G/DL (ref 3.5–5.2)
ALBUMIN/GLOBULIN RATIO: 0.8 (ref 1–2.5)
ALP BLD-CCNC: 377 U/L (ref 35–104)
ALT SERPL-CCNC: 7 U/L (ref 5–33)
AMORPHOUS: ABNORMAL
ANION GAP SERPL CALCULATED.3IONS-SCNC: 16 MMOL/L (ref 9–17)
AST SERPL-CCNC: 12 U/L
BACTERIA: ABNORMAL
BASOPHILS # BLD: 0 % (ref 0–2)
BASOPHILS ABSOLUTE: <0.03 K/UL (ref 0–0.2)
BILIRUB SERPL-MCNC: <0.1 MG/DL (ref 0.3–1.2)
BILIRUBIN URINE: NEGATIVE
BUN BLDV-MCNC: 8 MG/DL (ref 6–20)
C TRACH DNA GENITAL QL NAA+PROBE: NEGATIVE
CALCIUM SERPL-MCNC: 9.3 MG/DL (ref 8.6–10.4)
CANDIDA SPECIES, DNA PROBE: POSITIVE
CHLORIDE BLD-SCNC: 105 MMOL/L (ref 98–107)
CO2: 17 MMOL/L (ref 20–31)
COLOR: YELLOW
CREAT SERPL-MCNC: 0.5 MG/DL (ref 0.5–0.9)
CULTURE: NORMAL
EOSINOPHILS RELATIVE PERCENT: 0 % (ref 1–4)
EPITHELIAL CELLS UA: ABNORMAL /HPF (ref 0–5)
GARDNERELLA VAGINALIS, DNA PROBE: POSITIVE
GFR SERPL CREATININE-BSD FRML MDRD: >60 ML/MIN/1.73M2
GLUCOSE BLD-MCNC: 90 MG/DL (ref 70–99)
GLUCOSE URINE: NEGATIVE
HCT VFR BLD CALC: 33.4 % (ref 36.3–47.1)
HEMOGLOBIN: 10.3 G/DL (ref 11.9–15.1)
IMMATURE GRANULOCYTES: 1 %
KETONES, URINE: ABNORMAL
LEUKOCYTE ESTERASE, URINE: ABNORMAL
LYMPHOCYTES # BLD: 28 % (ref 25–45)
MCH RBC QN AUTO: 25.3 PG (ref 25.2–33.5)
MCHC RBC AUTO-ENTMCNC: 30.8 G/DL (ref 28.4–34.8)
MCV RBC AUTO: 82.1 FL (ref 82.6–102.9)
MONOCYTES # BLD: 8 % (ref 2–8)
MUCUS: ABNORMAL
N. GONORRHOEAE DNA: NEGATIVE
NITRITE, URINE: NEGATIVE
NRBC AUTOMATED: 0 PER 100 WBC
PDW BLD-RTO: 14.2 % (ref 11.8–14.4)
PH UA: 6.5 (ref 5–8)
PLATELET # BLD: 261 K/UL (ref 138–453)
PMV BLD AUTO: 10.9 FL (ref 8.1–13.5)
POTASSIUM SERPL-SCNC: 3.5 MMOL/L (ref 3.7–5.3)
PROTEIN UA: ABNORMAL
RBC # BLD: 4.07 M/UL (ref 3.95–5.11)
RBC # BLD: ABNORMAL 10*6/UL
RBC UA: ABNORMAL /HPF (ref 0–4)
SEG NEUTROPHILS: 63 % (ref 34–64)
SEGMENTED NEUTROPHILS ABSOLUTE COUNT: 6.89 K/UL (ref 1.8–8)
SODIUM BLD-SCNC: 138 MMOL/L (ref 135–144)
SOURCE: ABNORMAL
SPECIFIC GRAVITY UA: 1.03 (ref 1–1.03)
SPECIMEN DESCRIPTION: NORMAL
SPECIMEN DESCRIPTION: NORMAL
TOTAL PROTEIN: 6.8 G/DL (ref 6.4–8.3)
TRICHOMONAS VAGINALIS DNA: NEGATIVE
TURBIDITY: ABNORMAL
URINE HGB: NEGATIVE
UROBILINOGEN, URINE: NORMAL
WBC # BLD: 10.9 K/UL (ref 4.5–13.5)
WBC UA: ABNORMAL /HPF (ref 0–5)

## 2022-12-16 PROCEDURE — 36415 COLL VENOUS BLD VENIPUNCTURE: CPT

## 2022-12-16 PROCEDURE — 87591 N.GONORRHOEAE DNA AMP PROB: CPT

## 2022-12-16 PROCEDURE — 85025 COMPLETE CBC W/AUTO DIFF WBC: CPT

## 2022-12-16 PROCEDURE — 87480 CANDIDA DNA DIR PROBE: CPT

## 2022-12-16 PROCEDURE — 6370000000 HC RX 637 (ALT 250 FOR IP)

## 2022-12-16 PROCEDURE — 87510 GARDNER VAG DNA DIR PROBE: CPT

## 2022-12-16 PROCEDURE — 6370000000 HC RX 637 (ALT 250 FOR IP): Performed by: STUDENT IN AN ORGANIZED HEALTH CARE EDUCATION/TRAINING PROGRAM

## 2022-12-16 PROCEDURE — 87086 URINE CULTURE/COLONY COUNT: CPT

## 2022-12-16 PROCEDURE — 80053 COMPREHEN METABOLIC PANEL: CPT

## 2022-12-16 PROCEDURE — 87081 CULTURE SCREEN ONLY: CPT

## 2022-12-16 PROCEDURE — 87660 TRICHOMONAS VAGIN DIR PROBE: CPT

## 2022-12-16 PROCEDURE — 87491 CHLMYD TRACH DNA AMP PROBE: CPT

## 2022-12-16 PROCEDURE — 99213 OFFICE O/P EST LOW 20 MIN: CPT

## 2022-12-16 PROCEDURE — 81001 URINALYSIS AUTO W/SCOPE: CPT

## 2022-12-16 RX ORDER — CEPHALEXIN 500 MG/1
500 CAPSULE ORAL 4 TIMES DAILY
Qty: 28 CAPSULE | Refills: 0 | Status: SHIPPED | OUTPATIENT
Start: 2022-12-16 | End: 2022-12-23

## 2022-12-16 RX ORDER — METRONIDAZOLE 500 MG/1
500 TABLET ORAL 2 TIMES DAILY
Qty: 14 TABLET | Refills: 0 | Status: SHIPPED | OUTPATIENT
Start: 2022-12-16 | End: 2022-12-23

## 2022-12-16 RX ORDER — SERTRALINE HYDROCHLORIDE 25 MG/1
25 TABLET, FILM COATED ORAL DAILY
Qty: 30 TABLET | Refills: 3 | Status: SHIPPED | OUTPATIENT
Start: 2022-12-16

## 2022-12-16 RX ORDER — LORAZEPAM 1 MG/1
1 TABLET ORAL ONCE
Status: COMPLETED | OUTPATIENT
Start: 2022-12-16 | End: 2022-12-16

## 2022-12-16 RX ORDER — POTASSIUM CHLORIDE 750 MG/1
10 TABLET, EXTENDED RELEASE ORAL 2 TIMES DAILY
Qty: 8 TABLET | Refills: 0 | Status: SHIPPED | OUTPATIENT
Start: 2022-12-16 | End: 2022-12-20

## 2022-12-16 RX ORDER — ACETAMINOPHEN 500 MG
1000 TABLET ORAL EVERY 6 HOURS PRN
Status: DISCONTINUED | OUTPATIENT
Start: 2022-12-16 | End: 2022-12-16 | Stop reason: HOSPADM

## 2022-12-16 RX ORDER — FLUCONAZOLE 150 MG/1
150 TABLET ORAL ONCE
Qty: 1 TABLET | Refills: 0 | Status: SHIPPED | OUTPATIENT
Start: 2022-12-16 | End: 2022-12-16

## 2022-12-16 RX ORDER — CYCLOBENZAPRINE HCL 10 MG
10 TABLET ORAL 3 TIMES DAILY PRN
Status: DISCONTINUED | OUTPATIENT
Start: 2022-12-16 | End: 2022-12-16 | Stop reason: HOSPADM

## 2022-12-16 RX ORDER — ONDANSETRON 4 MG/1
4 TABLET, ORALLY DISINTEGRATING ORAL EVERY 8 HOURS PRN
Status: DISCONTINUED | OUTPATIENT
Start: 2022-12-16 | End: 2022-12-16 | Stop reason: HOSPADM

## 2022-12-16 RX ORDER — ONDANSETRON 2 MG/ML
4 INJECTION INTRAMUSCULAR; INTRAVENOUS EVERY 6 HOURS PRN
Status: DISCONTINUED | OUTPATIENT
Start: 2022-12-16 | End: 2022-12-16 | Stop reason: HOSPADM

## 2022-12-16 RX ADMIN — LORAZEPAM 1 MG: 1 TABLET ORAL at 01:50

## 2022-12-16 RX ADMIN — ACETAMINOPHEN 1000 MG: 500 TABLET ORAL at 01:16

## 2022-12-16 ASSESSMENT — PAIN DESCRIPTION - ORIENTATION: ORIENTATION: LOWER

## 2022-12-16 ASSESSMENT — PAIN DESCRIPTION - LOCATION: LOCATION: ABDOMEN

## 2022-12-16 ASSESSMENT — PAIN DESCRIPTION - DESCRIPTORS
DESCRIPTORS: CRAMPING
DESCRIPTORS: CRAMPING

## 2022-12-16 NOTE — FLOWSHEET NOTE
Patient to L&D with c/o lower abdominal pain that started last night that is constant and feels like period cramping. Abdomen soft to palpation. No LOF or vaginal bleeding. +FM.

## 2022-12-16 NOTE — FLOWSHEET NOTE
Patient discharged ambulatory to home with discharge instructions and prescriptions reviewed, patient verbalizes understanding.

## 2022-12-16 NOTE — PROGRESS NOTES
Obstetric/Gynecology Resident Interval Note    Called to bedside by karen resident to assess as patient is tearful and uncomfortable. Upon writer's entry to the room, patient is tearful stating that she has been in pain. Patient's partner states that he wanted her to come in last night but could not convince her to do so. BPP performed, however when probe rolling on patient's abdomen, patient does not seem to have severe or abnormal discomfort. BPP 8 out of 8. Fetal heart tracing reassuring and reactive and appropriate for gestational age. Maternal vital signs stable aside from mild elevation in blood pressure in the setting of a prior diagnosis of chronic hypertension on no medications. Decision was made to get stat CBC and CMP, and administer Tylenol for pain. Patient offered morphine however she declined. No evidence of contractions on the monitor. When questioning patient she became more tearful and states \"I have had 3 miscarriages I do not want to lose this baby. \"  Asked patient if she has been feeling anxious lately or if she struggles with anxiety, and patient nods yes to admit that she has been feeling very anxious due to her prior obstetrical history. Asked patient's partner if the patient is seems anxious he stated \"yes I have been trying to calm her down all day. \"  Offered patient extensive reassurance about fetal tracing, biophysical profile, maternal status. Explained to patient that as far as writer can see nothing acutely wrong at this time, however writer continued to validate patient experience and symptoms. In addition to working up patient's pain will administer Ativan for suspected anxiety attack. Patient amenable at this time. On SVE patient's noted to be fingertip dilated. Karen resident to perform sterile speculum examination. Dr. Elvira Smyth aware of patient arrival and presentation.     [facility], DO  OB/GYN Resident, PGY3  [address] [place]  12/16/2022, 3:42 AM

## 2022-12-16 NOTE — H&P
OBSTETRICAL HISTORY [facility]ly [facility]    Date: 2022       Time: 12:42 AM   Patient Name: Natalia Ramos     Patient : 2000  [facility]    Admission Date/Time: 2022 12:28 AM      CC: abdominal pain     HPI: Natalia Ramos is a 23 y.o.  at 36w0d who presents with abdominal pain for past 24 hours. She believes that it is cramping in nature, with an absence of sharp pain. A dull pain radiates through her back, and remains constant with episodic exacerbations. She denies any constipation, bowel movement today. The pain is not suprapubic, and she denies any urinary complaints. Vital signs have been stable since presentation, with an elevated blood pressure. Past medical history is significant for chronic hypertension without medication, and history of miscarriage x3. The patient reports fetal movement is present, complains of contractions, denies loss of fluid, denies vaginal bleeding. Patient denies headache, vision changes, nausea, vomiting, fever, chills, shortness of breath, chest pain, RUQ pain, abdominal pain, diarrhea, change in color/amount/odor of vaginal discharge, dysuria or, hematuria. DATING:  LMP: Patient's last menstrual period was 2022 (exact date).   Estimated Date of Delivery: 23   Based on: LMP, confirmed by early ultrasound at 8 3/7 weeks GA    PREGNANCY RISK FACTORS:  Patient Active Problem List   Diagnosis    History of recurrent miscarriages (x3), currently pregnant    Chlamydia (TOR neg)    Remote Hx STI    Exposure to genital herpes (current partner)    High-risk pregnancy    Obesity (BMI 30.17) affecting pregnancy in first trimester    COVID-19 vaccine series not completed    cHTN (no meds)    COVID-19    Hypotension    Tachycardia    COVID-19 virus infection    30 weeks gestation of pregnancy    Microcephaly of fetus    BV (bacterial vaginosis)    36 weeks gestation of pregnancy        Steroids Given In This Pregnancy:  no     REVIEW OF SYSTEMS:   Constitutional: negative fever, negative chills, negative weight changes   HEENT: negative visual disturbances, negative headaches, negative dizziness, negative hearing loss  Breast: Negative breast abnormalities, negative breast lumps, negative nipple discharge  Respiratory: negative dyspnea, negative cough, negative SOB  Cardiovascular: negative chest pain,  negative palpitations, negative arrhythmia, negative syncope   Gastrointestinal: + abdominal pain, negative RUQ pain, negative N/V, negative diarrhea, negative constipation, negative bowel changes, negative heartburn   Genitourinary: negative dysuria, negative hematuria, negative urinary incontinence, negative vaginal discharge, negative vaginal bleeding or spotting  Dermatological: negative rash, negative pruritis, negative mole or other skin changes  Hematologic: negative bruising  Immunologic/Lymphatic: negative recent illness, negative recent sick contact  Musculoskeletal: negative back pain, negative myalgias, negative arthralgias  Neurological:  negative dizziness, negative migraines, negative seizures, negative weakness  Behavior/Psych: negative depression, negative anxiety, negative SI, negative HI    OBSTETRIC HISTORY:   OB History    Para Term  AB Living   4 0 0 0 3 0   SAB IAB Ectopic Molar Multiple Live Births   3 0 0 0 0 0      # Outcome Date GA Lbr Moiz/2nd Weight Sex Delivery Anes PTL Lv   4 Current            3 2021     SAB      2 2021     SAB      1 2020              Obstetric Comments   G1-G4: FOB#1       PAST MEDICAL HISTORY:   has a past medical history of BV (bacterial vaginosis), Chlamydia, Chlamydia infection, Chlamydia infection affecting pregnancy, cHTN (no meds), Closed fracture of phalanx of middle finger, Gonorrhea, History of blood transfusion, Seasonal allergies, Strep throat, Trichomonas infection, and Trichomonas vaginitis.     PAST SURGICAL HISTORY:   has no past surgical history on file. ALLERGIES:  has No Known Allergies. MEDICATIONS:  Prior to Admission medications    Medication Sig Start Date End Date Taking? Authorizing Provider   ferrous sulfate (FE TABS) 325 (65 Fe) MG EC tablet Take 1 tablet by mouth 2 times daily 9/22/22   Silviano David, DO   ondansetron (ZOFRAN) 4 MG tablet Take 1 tablet by mouth daily as needed for Nausea or Vomiting 7/28/22   [facility], DO   aspirin EC 81 MG EC tablet Take 1 tablet by mouth daily 6/30/22   Refugia Care, DO   vitamin B-6 (PYRIDOXINE) 50 MG tablet Take 0.5 tablets by mouth in the morning, at noon, and at bedtime 6/1/22   Maryjane Torre MD   Prenatal Vit-Fe Fumarate-FA (PRENATAL VITAMIN) 27-0.8 MG TABS Take 1 tablet by mouth daily 5/4/22   Lui Jo MD       FAMILY HISTORY:  family history includes Arthritis in her maternal grandmother; Diabetes in her maternal grandfather; Heart Attack in her maternal grandmother; No Known Problems in her brother, mother, paternal grandfather, paternal grandmother, and sister; Pancreatic Cancer in her father; Stroke in her maternal grandfather. SOCIAL HISTORY:   reports that she has never smoked. She has been exposed to tobacco smoke. She has never used smokeless tobacco. She reports that she does not currently use alcohol. She reports that she does not use drugs. VITALS:  Vitals:    12/16/22 0040   BP: (!) 145/79   Pulse: (!) 101   SpO2: 100%       PHYSICAL EXAM:  Fetal Heart Monitor:  Baseline Heart Rate 155, moderate variability, present accelerations, absent decelerations  [place]: uterine irritabilitiy  General appearance:  no apparent distress, alert and cooperative  HEENT: head atraumatic, normocephalic, trachea midline, moist mucous membranes    Neurologic:  oriented, normal speech, no focal findings or movement disorder noted  Lungs:  no increased work of breathing, good air exchange. No use of accessory muscle, no cyanosis.   Heart: regular rate, no pitting edema, no cyanosis  Abdomen:  soft, gravid, non-tender on palpation, no right upper quadrant tenderness, uterus non-tender, no signs of abruption and no signs of chorioamnionitis  Extremities:  no calf tenderness bilaterally, non-edematous bilaterally   Musculoskeletal: no gross abnormalities, range of motion appropriate for age   Psychiatric: mood appropriate, normal affect   Rectal Exam: not indicated  Pelvic Exam:   Chaperone for Intimate Exam: Chaperone was present for entire exam, Chaperone Name: Debbie Carbone RN  Sterile Speculum Exam:   Urethral meatus: normal appearing   Vulva: Normal hair distribution, normal appearing vulva, no masses, tenderness or lesions, normal clitoris   Vagina: Normal appearing vaginal mucosa without lesions, white vaginal discharge noted in the posterior vault, no lacerations   Cervix: Normal appearing cervix without lesions, external os visibly closed, no lacerations or abnormal lesions visualized  Sterile Vaginal Exam:  Cervix: No cervical motion tenderness   Uterus: Is gravid, Normal size, shape, consistency and non-tender  Cervix: Fingertip dilated, fingertip effaced, -3 station, posterior position (out of 3 station), firm consistency, FETAL POSITION: Cephalic (confirmed by ultrasound), Membranes intact,    Bishops Score: 0     0 1 2 3   Position Posterior Intermediate Anterior -   Consistency Firm Intermediate Soft -   Effacement 0-30% 31-50% 51-80% >80%   Dilation 0cm 1-2cm 3-4cm >5cm   Fetal Station -3 -2 -1, 0 +1, +2     DATA:  Membranes Ruptured: No  Fern: negative  Nitrazine: Negative  Valsalva/Pooling: absent  Vaginal Bleeding: absent    LIMITED BEDSIDE US:  Position: Cephalic  Placental Location: Anterior  Fetal Heart Tones: Present  Fetal Movement: Present   Fetal Tone: Present  Fetal Breathing movement:  Present  Amniotic Fluid Index/Volume: adequate > 2x2 cm fluid pocket  BPP: 8/8    PRENATAL LAB RESULTS:   Blood Type/Rh: O pos  Antibody Screen: negative  Hemoglobin, Hematocrit, Platelets: 01.3/12.1/145  Rubella: immune  T. Pallidum, IgG: non-reactive  Hepatitis B Surface Antigen: non-reactive   Hepatitis C Antibody: non-reactive   HIV: non-reactive   Gonorrhea: negative  Chlamydia: negative  Urine culture: negative, date: 22    1Hr GTT Ordered, Not Completed    Group B Strep: collected on admission, 22  Cystic Fibrosis Screen: negative  Sickle Cell Screen: negative  First Trimester Screen: Low risk for aneuploidy  MSAFP: Not done  Non-Invasive Prenatal Testing: not done  Anatomy US: Anterior placenta, 3VC, male gender, normal anatomy    ASSESSMENT & PLAN:  Price Angulo is a 23 y.o. female  at 36w0d IUP   - GBS unknown / Rh positive / R immune   - No indication for GBS prophylaxis      Abdominal Pain/Cramping  - Patient complaining of cramping  - VSS/afebrile, blood pressure elevated   - cEFM/TOCO - Cat 1 tracing, absent contractions   - SSE: External genitalia without lesions. No lesions or lacerations to vaginal mucosa, no lesions noted on the cervix. Cervix appears closed on exam, without extravasation of blood or discharge. Pooling on Valsalva maneuver is absent. Nitrazine and ferning's test negative. White discharge noted, likely physiological discharge of pregnancy. - SVE: Fingertip/thick/high   - UA ordered on admission   - GC/C collected   - Vaginitis collected   - Low clinical suspicion for  labor or rupture membranes   - Tylenol 1 g given to patient   - Flexeril 10 mg 3 times daily as needed   - Patient experiencing anxiety, given 1 mg of Ativan p.o.   - BPP completed due to abdominal discomfort. Fetal tone present, fetal movement present, heart tones present, breathing movements present, adequate fluid with greater than 2 x 2 centimeter MVP.   Placenta identified anterior, without evidence of subchorionic bleed or placental abruption.   - Will continue to monitor for improvement      cHTN (no meds)   - Pressures elevated on admission, 145/79   - Patient not on any medication   - Denies signs/symptoms of preeclampsia   - CBC/CMP ordered on admission, within normal limits      Hx of recurrent miscarriage   - Miscarriage x3   - G1 through G3 pregnancies      Exposure to genital herpes (current partner)   - No lesions noted on SSE   - Denies prodromal symptoms of outbreak      COVID-19   - Denies any respiratory complaints, or sequela of previous infection      Microcephaly of fetus   - Patient following with maternal-fetal medicine      Chlamydia (TOR neg)   - Gonorrhea and Chlamydia swabs taken on admission      BMI 34      Patient Active Problem List    Diagnosis Date Noted    36 weeks gestation of pregnancy 12/16/2022     Priority: Medium    BV (bacterial vaginosis) 11/10/2022     Priority: Medium    30 weeks gestation of pregnancy 11/09/2022     Priority: Medium    Microcephaly of fetus 11/09/2022     Priority: Medium    COVID-19 virus infection 09/22/2022     Priority: Medium     Positive 9/21/22      COVID-19 09/21/2022     Priority: Medium    Hypotension 09/21/2022     Priority: Medium    Tachycardia 09/21/2022     Priority: Medium    History of recurrent miscarriages (x3), currently pregnant 06/16/2022     Priority: Medium     2020: SAB  2021: SAB  2021: SAB      Exposure to genital herpes (current partner) 06/16/2022     Priority: Medium    cHTN (no meds) 06/30/2022     /91 on 6/30  139/93 on 6/1      Chlamydia (TOR neg) 06/16/2022     3/2022: Treated with Azithromycin  TOR- see labs      Remote Hx STI 06/16/2022     Trichomonas, gonorrhea, chlamydia      High-risk pregnancy 06/16/2022     Risk factors: Obesity (BMI 30.17), chlamydia in pregnancy, COVID vaccine series not completed, recurrent miscarriage (x3)      Obesity (BMI 30.17) affecting pregnancy in first trimester 06/16/2022     Early one-hour glucose tolerance test ordered per protocol      COVID-19 vaccine series not completed 06/16/2022 Pt counseled on the the risks of not getting vaccinated in pregnancy against COVID-19. Pregnant women with symptomatic covid have a 70% increased risk of death. Covid-19 during pregnancy  increases the risk for adverse outcomes, including  birth and admission of the baby to an intensive care unit. Plan discussed with Dr. Sheri Triplett, who is agreeable. Steroids given this admission: No    Risks, benefits, alternatives and possible complications have been discussed in detail with the patient. Admission, and post admission procedures and expectations were discussed in detail. All questions were answered.     Attending's Name: Dr. Nikita Banks MD  Ob/Gyn Resident  2022, 12:42 AM

## 2022-12-18 LAB
CULTURE: NORMAL
SPECIMEN DESCRIPTION: NORMAL

## 2022-12-27 ENCOUNTER — HOSPITAL ENCOUNTER (INPATIENT)
Age: 22
LOS: 4 days | Discharge: HOME | DRG: 560 | End: 2022-12-31
Attending: OBSTETRICS & GYNECOLOGY | Admitting: OBSTETRICS & GYNECOLOGY
Payer: COMMERCIAL

## 2022-12-27 ENCOUNTER — APPOINTMENT (OUTPATIENT)
Dept: LABOR AND DELIVERY | Age: 22
DRG: 560 | End: 2022-12-27
Payer: COMMERCIAL

## 2022-12-27 ENCOUNTER — ROUTINE PRENATAL (OUTPATIENT)
Dept: PERINATAL CARE | Age: 22
End: 2022-12-27
Payer: COMMERCIAL

## 2022-12-27 VITALS
DIASTOLIC BLOOD PRESSURE: 74 MMHG | TEMPERATURE: 97.9 F | SYSTOLIC BLOOD PRESSURE: 125 MMHG | WEIGHT: 186 LBS | HEART RATE: 90 BPM | BODY MASS INDEX: 34.23 KG/M2 | RESPIRATION RATE: 16 BRPM | HEIGHT: 62 IN

## 2022-12-27 DIAGNOSIS — Z20.2 EXPOSURE TO GENITAL HERPES: ICD-10-CM

## 2022-12-27 DIAGNOSIS — O09.91 HIGH-RISK PREGNANCY IN FIRST TRIMESTER: ICD-10-CM

## 2022-12-27 DIAGNOSIS — O09.93 HIGH-RISK PREGNANCY IN THIRD TRIMESTER: ICD-10-CM

## 2022-12-27 DIAGNOSIS — O99.213 OBESITY AFFECTING PREGNANCY IN THIRD TRIMESTER: ICD-10-CM

## 2022-12-27 DIAGNOSIS — O36.5930 POOR FETAL GROWTH AFFECTING MANAGEMENT OF MOTHER IN THIRD TRIMESTER, SINGLE OR UNSPECIFIED FETUS: ICD-10-CM

## 2022-12-27 DIAGNOSIS — O36.5990 FETAL GROWTH RESTRICTION ANTEPARTUM: Primary | ICD-10-CM

## 2022-12-27 PROBLEM — B96.89 BV (BACTERIAL VAGINOSIS): Status: RESOLVED | Noted: 2022-11-10 | Resolved: 2022-12-27

## 2022-12-27 PROBLEM — I95.9 HYPOTENSION: Status: RESOLVED | Noted: 2022-09-21 | Resolved: 2022-12-27

## 2022-12-27 PROBLEM — Z3A.37 37 WEEKS GESTATION OF PREGNANCY: Status: ACTIVE | Noted: 2022-12-27

## 2022-12-27 PROBLEM — Z3A.36 36 WEEKS GESTATION OF PREGNANCY: Status: RESOLVED | Noted: 2022-12-16 | Resolved: 2022-12-27

## 2022-12-27 PROBLEM — R00.0 TACHYCARDIA: Status: RESOLVED | Noted: 2022-09-21 | Resolved: 2022-12-27

## 2022-12-27 PROBLEM — U07.1 COVID-19 VIRUS INFECTION: Status: RESOLVED | Noted: 2022-09-22 | Resolved: 2022-12-27

## 2022-12-27 PROBLEM — N76.0 BV (BACTERIAL VAGINOSIS): Status: RESOLVED | Noted: 2022-11-10 | Resolved: 2022-12-27

## 2022-12-27 PROBLEM — Z3A.30 30 WEEKS GESTATION OF PREGNANCY: Status: RESOLVED | Noted: 2022-11-09 | Resolved: 2022-12-27

## 2022-12-27 PROBLEM — A54.9 GONORRHEA: Status: RESOLVED | Noted: 2022-06-16 | Resolved: 2022-12-27

## 2022-12-27 LAB
ABDOMINAL CIRCUMFERENCE: NORMAL
ABO/RH: NORMAL
ABSOLUTE EOS #: 0.03 K/UL (ref 0–0.44)
ABSOLUTE IMMATURE GRANULOCYTE: 0.03 K/UL (ref 0–0.3)
ABSOLUTE LYMPH #: 2.36 K/UL (ref 1.2–5.2)
ABSOLUTE MONO #: 1.02 K/UL (ref 0.1–1.4)
AMPHETAMINE SCREEN URINE: NEGATIVE
ANTIBODY SCREEN: NEGATIVE
ARM BAND NUMBER: NORMAL
BARBITURATE SCREEN URINE: NEGATIVE
BASOPHILS # BLD: 0 % (ref 0–2)
BASOPHILS ABSOLUTE: <0.03 K/UL (ref 0–0.2)
BENZODIAZEPINE SCREEN, URINE: NEGATIVE
BIPARIETAL DIAMETER: NORMAL
CANNABINOID SCREEN URINE: POSITIVE
COCAINE METABOLITE, URINE: NEGATIVE
EOSINOPHILS RELATIVE PERCENT: 0 % (ref 1–4)
ESTIMATED FETAL WEIGHT: NORMAL
EXPIRATION DATE: NORMAL
FEMORAL DIAMETER: NORMAL
FENTANYL URINE: NEGATIVE
HC/AC: NORMAL
HCT VFR BLD CALC: 31.5 % (ref 36.3–47.1)
HEAD CIRCUMFERENCE: NORMAL
HEMOGLOBIN: 9.9 G/DL (ref 11.9–15.1)
IMMATURE GRANULOCYTES: 0 %
LYMPHOCYTES # BLD: 29 % (ref 25–45)
MCH RBC QN AUTO: 24.6 PG (ref 25.2–33.5)
MCHC RBC AUTO-ENTMCNC: 31.4 G/DL (ref 28.4–34.8)
MCV RBC AUTO: 78.4 FL (ref 82.6–102.9)
METHADONE SCREEN, URINE: NEGATIVE
MONOCYTES # BLD: 13 % (ref 2–8)
NRBC AUTOMATED: 0 PER 100 WBC
OPIATES, URINE: NEGATIVE
OXYCODONE SCREEN URINE: NEGATIVE
PDW BLD-RTO: 14.3 % (ref 11.8–14.4)
PHENCYCLIDINE, URINE: NEGATIVE
PLATELET # BLD: 293 K/UL (ref 138–453)
PMV BLD AUTO: 10.8 FL (ref 8.1–13.5)
RBC # BLD: 4.02 M/UL (ref 3.95–5.11)
RBC # BLD: ABNORMAL 10*6/UL
SEG NEUTROPHILS: 58 % (ref 34–64)
SEGMENTED NEUTROPHILS ABSOLUTE COUNT: 4.66 K/UL (ref 1.8–8)
TEST INFORMATION: ABNORMAL
WBC # BLD: 8.1 K/UL (ref 4.5–13.5)

## 2022-12-27 PROCEDURE — 76820 UMBILICAL ARTERY ECHO: CPT | Performed by: OBSTETRICS & GYNECOLOGY

## 2022-12-27 PROCEDURE — 76816 OB US FOLLOW-UP PER FETUS: CPT | Performed by: OBSTETRICS & GYNECOLOGY

## 2022-12-27 PROCEDURE — 76821 MIDDLE CEREBRAL ARTERY ECHO: CPT | Performed by: OBSTETRICS & GYNECOLOGY

## 2022-12-27 PROCEDURE — 99999 PR OFFICE/OUTPT VISIT,PROCEDURE ONLY: CPT | Performed by: OBSTETRICS & GYNECOLOGY

## 2022-12-27 PROCEDURE — 80053 COMPREHEN METABOLIC PANEL: CPT

## 2022-12-27 PROCEDURE — 86900 BLOOD TYPING SEROLOGIC ABO: CPT

## 2022-12-27 PROCEDURE — 86850 RBC ANTIBODY SCREEN: CPT

## 2022-12-27 PROCEDURE — 1220000000 HC SEMI PRIVATE OB R&B

## 2022-12-27 PROCEDURE — 84156 ASSAY OF PROTEIN URINE: CPT

## 2022-12-27 PROCEDURE — 82570 ASSAY OF URINE CREATININE: CPT

## 2022-12-27 PROCEDURE — 86901 BLOOD TYPING SEROLOGIC RH(D): CPT

## 2022-12-27 PROCEDURE — 76819 FETAL BIOPHYS PROFIL W/O NST: CPT | Performed by: OBSTETRICS & GYNECOLOGY

## 2022-12-27 PROCEDURE — 85025 COMPLETE CBC W/AUTO DIFF WBC: CPT

## 2022-12-27 PROCEDURE — 80307 DRUG TEST PRSMV CHEM ANLYZR: CPT

## 2022-12-27 PROCEDURE — 86780 TREPONEMA PALLIDUM: CPT

## 2022-12-27 PROCEDURE — 2580000003 HC RX 258: Performed by: STUDENT IN AN ORGANIZED HEALTH CARE EDUCATION/TRAINING PROGRAM

## 2022-12-27 RX ORDER — SODIUM CHLORIDE, SODIUM LACTATE, POTASSIUM CHLORIDE, AND CALCIUM CHLORIDE .6; .31; .03; .02 G/100ML; G/100ML; G/100ML; G/100ML
500 INJECTION, SOLUTION INTRAVENOUS PRN
Status: DISCONTINUED | OUTPATIENT
Start: 2022-12-27 | End: 2022-12-29

## 2022-12-27 RX ORDER — SODIUM CHLORIDE, SODIUM LACTATE, POTASSIUM CHLORIDE, CALCIUM CHLORIDE 600; 310; 30; 20 MG/100ML; MG/100ML; MG/100ML; MG/100ML
INJECTION, SOLUTION INTRAVENOUS CONTINUOUS
Status: DISCONTINUED | OUTPATIENT
Start: 2022-12-27 | End: 2022-12-29

## 2022-12-27 RX ORDER — DIPHENHYDRAMINE HCL 25 MG
25 TABLET ORAL EVERY 4 HOURS PRN
Status: DISCONTINUED | OUTPATIENT
Start: 2022-12-27 | End: 2022-12-29

## 2022-12-27 RX ORDER — SODIUM CHLORIDE 0.9 % (FLUSH) 0.9 %
5-40 SYRINGE (ML) INJECTION EVERY 12 HOURS SCHEDULED
Status: DISCONTINUED | OUTPATIENT
Start: 2022-12-27 | End: 2022-12-29

## 2022-12-27 RX ORDER — SODIUM CHLORIDE, SODIUM LACTATE, POTASSIUM CHLORIDE, AND CALCIUM CHLORIDE .6; .31; .03; .02 G/100ML; G/100ML; G/100ML; G/100ML
1000 INJECTION, SOLUTION INTRAVENOUS PRN
Status: DISCONTINUED | OUTPATIENT
Start: 2022-12-27 | End: 2022-12-29

## 2022-12-27 RX ORDER — ONDANSETRON 2 MG/ML
4 INJECTION INTRAMUSCULAR; INTRAVENOUS EVERY 6 HOURS PRN
Status: DISCONTINUED | OUTPATIENT
Start: 2022-12-27 | End: 2022-12-29

## 2022-12-27 RX ORDER — DOCUSATE SODIUM 100 MG/1
100 CAPSULE, LIQUID FILLED ORAL 2 TIMES DAILY
Status: DISCONTINUED | OUTPATIENT
Start: 2022-12-27 | End: 2022-12-29

## 2022-12-27 RX ORDER — ACETAMINOPHEN 500 MG
1000 TABLET ORAL EVERY 6 HOURS PRN
Status: DISCONTINUED | OUTPATIENT
Start: 2022-12-27 | End: 2022-12-29

## 2022-12-27 RX ORDER — SODIUM CHLORIDE 9 MG/ML
25 INJECTION, SOLUTION INTRAVENOUS PRN
Status: DISCONTINUED | OUTPATIENT
Start: 2022-12-27 | End: 2022-12-29

## 2022-12-27 RX ORDER — SODIUM CHLORIDE 0.9 % (FLUSH) 0.9 %
5-40 SYRINGE (ML) INJECTION PRN
Status: DISCONTINUED | OUTPATIENT
Start: 2022-12-27 | End: 2022-12-29

## 2022-12-27 RX ADMIN — SODIUM CHLORIDE, POTASSIUM CHLORIDE, SODIUM LACTATE AND CALCIUM CHLORIDE: 600; 310; 30; 20 INJECTION, SOLUTION INTRAVENOUS at 23:06

## 2022-12-27 NOTE — PROGRESS NOTES
Obstetric/Gynecology Maternal Fetal Medicine Resident Note    Patient scanned for repeat growth scan today and again noted FGR with AC<3rd and EFW <10th%. Patient has a diagnosis of cHTN (no meds). Of note there is low fluid on scan today with a MVP of 2.06. Case discussed with Dr. Manda Dunn and 2/2 patient non-compliance with concomitant findings recommend delivery today. IOL scheduled for 10 pm tonight. Patient agreeable. Dr. Clayton Jack and Residents updated.     Evon Mishra DO  OBGYN Resident, PGY [address]  12/27/2022, 3:43 PM boyfriend

## 2022-12-28 LAB
ALBUMIN SERPL-MCNC: 3.5 G/DL (ref 3.5–5.2)
ALBUMIN/GLOBULIN RATIO: 1 (ref 1–2.5)
ALP BLD-CCNC: 403 U/L (ref 35–104)
ALT SERPL-CCNC: 9 U/L (ref 5–33)
ANION GAP SERPL CALCULATED.3IONS-SCNC: 14 MMOL/L (ref 9–17)
AST SERPL-CCNC: 14 U/L
BILIRUB SERPL-MCNC: <0.1 MG/DL (ref 0.3–1.2)
BUN BLDV-MCNC: 10 MG/DL (ref 6–20)
CALCIUM SERPL-MCNC: 9 MG/DL (ref 8.6–10.4)
CHLORIDE BLD-SCNC: 99 MMOL/L (ref 98–107)
CO2: 19 MMOL/L (ref 20–31)
CREAT SERPL-MCNC: 0.46 MG/DL (ref 0.5–0.9)
CREATININE URINE: 287.5 MG/DL (ref 28–217)
GFR SERPL CREATININE-BSD FRML MDRD: >60 ML/MIN/1.73M2
GLUCOSE BLD-MCNC: 110 MG/DL (ref 70–99)
POTASSIUM SERPL-SCNC: 3.9 MMOL/L (ref 3.7–5.3)
SODIUM BLD-SCNC: 132 MMOL/L (ref 135–144)
T. PALLIDUM, IGG: NONREACTIVE
TOTAL PROTEIN, URINE: 36 MG/DL
TOTAL PROTEIN: 6.9 G/DL (ref 6.4–8.3)
URINE TOTAL PROTEIN CREATININE RATIO: 0.13 (ref 0–0.2)

## 2022-12-28 PROCEDURE — 6370000000 HC RX 637 (ALT 250 FOR IP)

## 2022-12-28 PROCEDURE — 2580000003 HC RX 258: Performed by: STUDENT IN AN ORGANIZED HEALTH CARE EDUCATION/TRAINING PROGRAM

## 2022-12-28 PROCEDURE — 1220000000 HC SEMI PRIVATE OB R&B

## 2022-12-28 PROCEDURE — 6360000002 HC RX W HCPCS: Performed by: STUDENT IN AN ORGANIZED HEALTH CARE EDUCATION/TRAINING PROGRAM

## 2022-12-28 PROCEDURE — 6360000002 HC RX W HCPCS

## 2022-12-28 PROCEDURE — 6370000000 HC RX 637 (ALT 250 FOR IP): Performed by: STUDENT IN AN ORGANIZED HEALTH CARE EDUCATION/TRAINING PROGRAM

## 2022-12-28 RX ORDER — MORPHINE SULFATE 2 MG/ML
2 INJECTION, SOLUTION INTRAMUSCULAR; INTRAVENOUS ONCE
Status: COMPLETED | OUTPATIENT
Start: 2022-12-28 | End: 2022-12-28

## 2022-12-28 RX ORDER — ROPIVACAINE HYDROCHLORIDE 2 MG/ML
INJECTION, SOLUTION EPIDURAL; INFILTRATION; PERINEURAL
Status: COMPLETED
Start: 2022-12-28 | End: 2022-12-29

## 2022-12-28 RX ORDER — PROCHLORPERAZINE EDISYLATE 5 MG/ML
10 INJECTION INTRAMUSCULAR; INTRAVENOUS ONCE
Status: COMPLETED | OUTPATIENT
Start: 2022-12-28 | End: 2022-12-28

## 2022-12-28 RX ORDER — ACYCLOVIR 200 MG/1
400 CAPSULE ORAL 3 TIMES DAILY
Status: DISCONTINUED | OUTPATIENT
Start: 2022-12-28 | End: 2022-12-29

## 2022-12-28 RX ORDER — MORPHINE SULFATE 10 MG/ML
8 INJECTION, SOLUTION INTRAMUSCULAR; INTRAVENOUS ONCE
Status: COMPLETED | OUTPATIENT
Start: 2022-12-28 | End: 2022-12-28

## 2022-12-28 RX ADMIN — MORPHINE SULFATE 2 MG: 2 INJECTION, SOLUTION INTRAMUSCULAR; INTRAVENOUS at 12:02

## 2022-12-28 RX ADMIN — ACYCLOVIR 400 MG: 200 CAPSULE ORAL at 15:02

## 2022-12-28 RX ADMIN — SODIUM CHLORIDE, POTASSIUM CHLORIDE, SODIUM LACTATE AND CALCIUM CHLORIDE: 600; 310; 30; 20 INJECTION, SOLUTION INTRAVENOUS at 05:56

## 2022-12-28 RX ADMIN — ACETAMINOPHEN 1000 MG: 500 TABLET ORAL at 05:50

## 2022-12-28 RX ADMIN — Medication 25 MCG: at 12:57

## 2022-12-28 RX ADMIN — ACYCLOVIR 400 MG: 200 CAPSULE ORAL at 21:01

## 2022-12-28 RX ADMIN — DINOPROSTONE 10 MG: 10 INSERT VAGINAL at 00:08

## 2022-12-28 RX ADMIN — Medication 25 MCG: at 17:08

## 2022-12-28 RX ADMIN — MORPHINE SULFATE 8 MG: 10 INJECTION INTRAVENOUS at 11:43

## 2022-12-28 RX ADMIN — Medication 1 MILLI-UNITS/MIN: at 21:32

## 2022-12-28 RX ADMIN — PROCHLORPERAZINE EDISYLATE 10 MG: 5 INJECTION INTRAMUSCULAR; INTRAVENOUS at 11:43

## 2022-12-28 ASSESSMENT — PAIN SCALES - GENERAL: PAINLEVEL_OUTOF10: 5

## 2022-12-28 ASSESSMENT — PAIN DESCRIPTION - LOCATION: LOCATION: BACK

## 2022-12-28 ASSESSMENT — PAIN DESCRIPTION - DESCRIPTORS: DESCRIPTORS: DISCOMFORT;ACHING

## 2022-12-28 NOTE — PROGRESS NOTES
OBGYN Labor Progress Note    Whitney Diaz is a 23 y.o. female  at 37w6d  The patient was seen and examined. Her pain is well controlled. She reports fetal movement is present, complains of contractions, denies loss of fluid, denies vaginal bleeding. She is agreeable to cervical check.       Vital Signs:  Vitals:    22 2139 22 0010 22 0341   BP: 136/82 139/78 120/60   Pulse: (!) 103 95 93   Resp: 17 16 16   Temp: 98.5 °F (36.9 °C) 98.2 °F (36.8 °C) 98.3 °F (36.8 °C)   TempSrc: Oral Oral Oral   SpO2: 98%         FHT:  135 , moderate variability, accelerations present, decelerations absent   Contractions: regular, every 2-3 minutes    Examination chaperoned by Jace GIBBS    Cervical Exam: 1 cm dilated, 30% effaced, -3 station  Pitocin: @ 0 mu/min    Membranes: Intact  Scalp Electrode in place: absent  Intrauterine Pressure Catheter in Place: absent    Interventions: SVE    Assessment/Plan:  Whitney Diaz is a 23 y.o. female  at 37w5d IUP   - GBS negative / Rh positive / R immune   - No indication for GBS prophylaxis    IOL 2/2 FGR (AC<3%) with cHTN (no meds)   - VSS   - cEFM and TOCO   - IVF:  mL/hr   - Denies s/s of PreE   - PreE labs wnl, P/C 0.13   - Cervidil in place, due out at 1210   - Plan for lehman balloon when able   - Continue expectant management    Anemia   - Hgb 9.9 on admission   - Clinically asymptomatic     THC use   - UDS positive on admission   - Social work consulted pp       Senior resident updated and in agreement with plan    Link Butler DO  OBSOPHIAN Resident  2022, 6:54 AM

## 2022-12-28 NOTE — PROGRESS NOTES
Obstetric/Gynecology Resident Interval Note    Provider was informed by patient that patient had been exposed to genital herpes by her partner. Patient at this time denies s/s and states she has had no prodromal symptoms. States her partner has taken medication for his lesions however they still have been sexually active. Speculum exam revealed no active lesions on vagina, cervix, or vaginal canal. Decision at this time was made to start patient on acyclovir 400 mg TID.     Bereket Ho MD  OB/GYN Resident, PGY1  [facility]  12/28/2022, 12:25 PM

## 2022-12-28 NOTE — CARE COORDINATION
ANTEPARTUM NOTE    37 weeks gestation of pregnancy [Z3A.37]    Hodan was admitted to L&D on 12/27/22 for IOL due to [address] @ 37w4d    OB GYN Provider: [facility]    Will meet with patient after delivery to verify name/address/phone/insurance and discuss discharge planning. Anticipate DC home 2 nights after vaginal delivery or 4 nights after C/S delivery as long as hemodynamically stable.

## 2022-12-28 NOTE — FLOWSHEET NOTE
Pt is feeling some mild discomfort in her lower back, tolerable. Does not request any pain interventions at this time.

## 2022-12-28 NOTE — FLOWSHEET NOTE
Dr. Bisi Corrales at bedside SVE. Pt 1 30 -3. Left cervidil in place. Pt does not desire any more pain intervention at this time.

## 2022-12-28 NOTE — PROGRESS NOTES
Labor Progress Note    Steve Chairez is a 23 y.o. female  at 37w6d  The patient was seen and examined. Her pain is well controlled. She reports fetal movement is present, complains of contractions, denies loss of fluid, denies vaginal bleeding. Vital Signs:  Vitals:    22 0010 22 0341 22 0800 22 1147   BP: 139/78 120/60 131/89 (!) 138/91   Pulse: 95 93 96 80   Resp: 16 16 18 16   Temp: 98.2 °F (36.8 °C) 98.3 °F (36.8 °C) 98.1 °F (36.7 °C) 98.2 °F (36.8 °C)   TempSrc: Oral Oral Oral Oral   SpO2:             FHT:  125 , moderate variability, accelerations present, decelerations absent  Contractions: intermittent contractions    Chaperone for Intimate Exam: Chaperone was present for entire exam, Chaperone Name: aJme Jean-Baptiste RN  Cervical Exam: 1 cm dilated, 30 effaced, -2 station  Pitocin: n/a    Membranes: Intact  Scalp Electrode in place: absent  Intrauterine Pressure Catheter in Place: absent    Interventions: SVE and lehman balloon placement    Assessment/Plan:  Steve Chairez is a 23 y.o. female  at 37w6d admitted for IOL 2/2 FGR (AC<3%) with cHTN (no meds)   - GBS negative, No indication for GBS prophylaxis   - VSS, afebrile   - cEFM/TOCO; cat 1   - Denies s/s of PreE   - s/p cervidil   - Lehman balloon placed   - Cytotec 25 mcg PO x1 ordered   - Continue current management      Attending updated and in agreement with plan    Robert Andrea MD  Ob/Gyn Resident  2022, 12:28 PM        Attending Physician Statement  I have discussed the care of Steve Chairez, including pertinent history and exam findings,  with the resident. I have reviewed the key elements of all parts of the encounter with the resident. I agree with the assessment, plan and orders as documented by the resident.   [facility] Modifier)    Electronically signed by Jesus Alberto Osullivan DO  2022  1:26 PM

## 2022-12-28 NOTE — FLOWSHEET NOTE
Pt called out for Tylenol for lower back pain. States that she is feeling more crampy/ctx in her stomach. Provided with birthing ball and placed on wireless monitor so she can be up and moving. Will continue to monitor.

## 2022-12-28 NOTE — DISCHARGE SUMMARY
Obstetric Discharge Summary  [facility]    Patient Name: Steve Chairez  Patient : 2000  Primary Care Physician: JAM Rebollar - CNP  Admit Date: 2022    Principal Diagnosis: IUP at 37w4d, admitted for induction of labor 2/2 FGR and cHTN    Her pregnancy has been complicated by:   Patient Active Problem List   Diagnosis    History of recurrent miscarriages (x3), currently pregnant    Chlamydia (TOR neg)    Exposure to genital herpes (current partner)    High-risk pregnancy    Obesity (BMI 30.17) affecting pregnancy in first trimester    COVID-19 vaccine series not completed    cHTN (no meds)    COVID-19    Microcephaly of fetus    FGR (AC <3rd%)    37 weeks gestation of pregnancy     22 M Apg 8/9 Wt 5#10    Postpartum state       Infection Present?: No  Hospital Acquired: No    Surgical Operations & Procedures:  Analgesia: epidural  Delivery Type: Spontaneous Vaginal Delivery: See Labor and Delivery Summary   Laceration(s): Absent    Consultations: and Anesthesia    Pertinent Findings & Procedures:   Steve Chairez is a 23 y.o. female  at 37w1d admitted for IOL 2/2 FGR and cHTN (no meds); received cervidil x1, morphine/compazine x1, lehman balloon, cytotec 25 mcg PO x2, cervidil, pitocin, AROM (clear fluid), epidural, IUPC, nubain x1. She delivered by spontaneous vaginal a Live Born infant on 22. Information for the patient's :  Fermin Jerome [[id number]]   male   Birth Weight: 5 lb 10.5 oz (2.565 kg)     Apgars: 8 at 1 minute and 9 at 5 minutes. Postpartum course: normal.  She chandana discharged with Iron for anemia.     Course of patient: uncomplicated    Discharge to: Home    Readmission planned: no     Recommendations on Discharge:     Medications:      Medication List        START taking these medications      ibuprofen 600 MG tablet  Commonly known as: ADVIL;MOTRIN  Take 1 tablet by mouth every 6 hours as needed for Pain     senna-docusate 8.6-50 MG per tablet  Commonly known as: Senokot S  Take 1 tablet by mouth daily for 60 doses            CONTINUE taking these medications      ferrous sulfate 325 (65 Fe) MG EC tablet  Commonly known as: Fe Tabs  Take 1 tablet by mouth 2 times daily     ondansetron 4 MG tablet  Commonly known as: ZOFRAN  Take 1 tablet by mouth daily as needed for Nausea or Vomiting     potassium chloride 10 MEQ extended release tablet  Commonly known as: KLOR-CON M  Take 1 tablet by mouth 2 times daily for 4 days     Prenatal Vitamin 27-0.8 MG Tabs  Take 1 tablet by mouth daily     sertraline 25 MG tablet  Commonly known as: Zoloft  Take 1 tablet by mouth daily     vitamin B-6 50 MG tablet  Commonly known as: PYRIDOXINE  Take 0.5 tablets by mouth in the morning, at noon, and at bedtime            STOP taking these medications      aspirin EC 81 MG EC tablet               Where to Get Your Medications        These medications were sent to [address] [facility] [address]      Phone: [phone]   ferrous sulfate 325 (65 Fe) MG EC tablet  ibuprofen 600 MG tablet  senna-docusate 8.6-50 MG per tablet         Activity: pelvic rest x 6 weeks  Diet: regular diet  Follow up: 1 week for BP check    Condition on discharge: stable    Discharge date: 12/31/22    Romana Sanes, MD  Ob/Gyn Resident    Comments:  Home care and follow-up care were reviewed. Pelvic rest, and birth control were reviewed. Signs and symptoms of mastitis and post partum depression were reviewed. The patient is to notify her physician if any of these occur. The patient was counseled on secondary smoke risks and the increased risk of sudden infant death syndrome and respiratory problems to her baby with exposure. She was counseled on various alternate recommendations to decrease the exposure to secondary smoke to her children.

## 2022-12-28 NOTE — FLOWSHEET NOTE
PT ADMITTED TO ROOM 706 FROM home VIA  personal transportation. PT ASSISTED INTO BED. ORIENTED TO ROOM AND SURROUNDINGS. PLAN OF CARE, INFANT SECURITY AND VISITING HOURS DISCUSSED. PT VERBALIZES UNDERSTANDING. EFM and toco applied. FHT 150s.

## 2022-12-28 NOTE — H&P
OBSTETRICAL HISTORY [facility]    Date: 2022       Time: 10:08 PM   Patient Name: Marlin Moise     Patient : 2000  Room/Bed: 06/0706-01    Admission Date/Time: 2022  9:00 PM      CC: induction of labor 2/2 fetal growth restriction     HPI: Marlin Moise is a 23 y.o.  at 37w4d who presents for scheduled induction of labor 2/2 fetal growth restriction . The patient reports fetal movement is present, denies contractions, denies loss of fluid, denies vaginal bleeding. Patient denies headache, vision changes, nausea, vomiting, fever, chills, shortness of breath, chest pain, RUQ pain, abdominal pain, diarrhea, change in color/amount/odor of vaginal discharge, dysuria or, hematuria. Pregnancy is complicated by fetal microcephaly, history of COVID infection and chlamydia (TOR neg). DATING:  LMP: Patient's last menstrual period was 2022 (exact date).   Estimated Date of Delivery: 23   Based on: LMP consistent with early ultrasound, at 8 6/7 weeks GA    PREGNANCY RISK FACTORS:  Patient Active Problem List   Diagnosis    History of recurrent miscarriages (x3), currently pregnant    Chlamydia (TOR neg)    Exposure to genital herpes (current partner)    High-risk pregnancy    Obesity (BMI 30.17) affecting pregnancy in first trimester    COVID-19 vaccine series not completed    cHTN (no meds)    COVID-19    Microcephaly of fetus    FGR (AC <3rd%)    37 weeks gestation of pregnancy        Steroids Given In This Pregnancy:  no     REVIEW OF SYSTEMS:   Constitutional: negative fever, negative chills, negative weight changes   HEENT: negative visual disturbances, negative headaches, negative dizziness, negative hearing loss  Breast: Negative breast abnormalities, negative breast lumps, negative nipple discharge  Respiratory: negative dyspnea, negative cough, negative SOB  Cardiovascular: negative chest pain,  negative palpitations, negative arrhythmia, negative syncope   Gastrointestinal: negative abdominal pain/contractions, negative RUQ pain, negative N/V, negative diarrhea, negative constipation, negative bowel changes, negative heartburn   Genitourinary: negative dysuria, negative hematuria, negative urinary incontinence, negative vaginal discharge, negative vaginal bleeding or spotting  Dermatological: negative rash, negative pruritis, negative mole or other skin changes  Hematologic: negative bruising  Immunologic/Lymphatic: negative recent illness, negative recent sick contact  Musculoskeletal: negative back pain, negative myalgias, negative arthralgias  Neurological:  negative dizziness, negative migraines, negative seizures, negative weakness  Behavior/Psych: negative depression, negative anxiety, negative SI, negative HI    OBSTETRICAL HISTORY:   OB History    Para Term  AB Living   4 0 0 0 3 0   SAB IAB Ectopic Molar Multiple Live Births   3 0 0 0 0 0      # Outcome Date GA Lbr Moiz/2nd Weight Sex Delivery Anes PTL Lv   4 Current            3 2021     SAB      2 2021     SAB      1 2020              Obstetric Comments   G1-G4: FOB#1       PAST MEDICAL HISTORY:   has a past medical history of BV (bacterial vaginosis), Chlamydia, Chlamydia infection, Chlamydia infection affecting pregnancy, cHTN (no meds), Closed fracture of phalanx of middle finger, Gonorrhea, History of blood transfusion, Seasonal allergies, Strep throat, Trichomonas infection, and Trichomonas vaginitis. PAST SURGICAL HISTORY:   has no past surgical history on file. ALLERGIES:  has No Known Allergies. MEDICATIONS:  Prior to Admission medications    Medication Sig Start Date End Date Taking?  Authorizing Provider   sertraline (ZOLOFT) 25 MG tablet Take 1 tablet by mouth daily 22   Salma Brandon,    potassium chloride (KLOR-CON M) 10 MEQ extended release tablet Take 1 tablet by mouth 2 times daily for 4 days 22 Vanessa Diego, DO   ferrous sulfate (FE TABS) 325 (65 Fe) MG EC tablet Take 1 tablet by mouth 2 times daily 9/22/22   Vanessa Diego, DO   ondansetron (ZOFRAN) 4 MG tablet Take 1 tablet by mouth daily as needed for Nausea or Vomiting 7/28/22   Cathye Cheadle, DO   aspirin EC 81 MG EC tablet Take 1 tablet by mouth daily 6/30/22   Kirk Hill, DO   vitamin B-6 (PYRIDOXINE) 50 MG tablet Take 0.5 tablets by mouth in the morning, at noon, and at bedtime 6/1/22   Kenzie Gasca MD   Prenatal Vit-Fe Fumarate-FA (PRENATAL VITAMIN) 27-0.8 MG TABS Take 1 tablet by mouth daily 5/4/22   Stevphen Lennox, MD       FAMILY HISTORY:  family history includes Arthritis in her maternal grandmother; Diabetes in her maternal grandfather; Heart Attack in her maternal grandmother; No Known Problems in her brother, mother, paternal grandfather, paternal grandmother, and sister; Pancreatic Cancer in her father; Stroke in her maternal grandfather. SOCIAL HISTORY:   reports that she has never smoked. She has been exposed to tobacco smoke. She has never used smokeless tobacco. She reports that she does not currently use alcohol. She reports that she does not use drugs.     VITALS:  Vitals:    12/27/22 2139 12/28/22 0010   BP: 136/82 139/78   Pulse: (!) 103 95   Resp: 17 16   Temp: 98.5 °F (36.9 °C) 98.2 °F (36.8 °C)   TempSrc: Oral Oral   SpO2: 98%          PHYSICAL EXAM:  Fetal Heart Monitor:  Baseline Heart Rate 140, moderate variability, present accelerations, absent decelerations  [place]: irritability    General appearance:  no apparent distress, alert and cooperative  HEENT: head atraumatic, normocephalic, moist mucous membranes, trachea midline  Neurologic:  alert, oriented, normal speech, no focal findings or movement disorder noted  Lungs:  No increased work of breathing, good air exchange, clear to auscultation bilaterally, no crackles or wheezing  Heart:  regular rate and rhythm and no murmur, rubs, gallops  Abdomen:  soft, gravid, non-tender, no rebound, guarding, or rigidity, no RUQ or epigastric tenderness, no signs or symptoms of abruption, no signs or symptoms of chorioamnionitis,  Extremities:  no calf tenderness, non edematous, no varicosities, full range of motion in all four extremities  Musculoskeletal: Gross strength equal and intact throughout, no gross abnormalities, range of motion normal in hips, knees, shoulders and spine  Psychiatric: Mood appropriate, normal affect   Rectal Exam: not indicated  Pelvic Exam:   Chaperone for Intimate Exam: Chaperone was present for entire exam, Chaperone Name: Forrest President RN  Sterile Vaginal Exam:  Cervix: No cervical motion tenderness   Uterus: Is gravid, Normal size, shape, consistency and non-tender   Adnexa: Non-tender, no palpable masses  Cervix: Closed dilated, 0 % effaced, -3 station, posterior position (out of 3 station), medium consistency, FETAL POSITION: Cephalic (confirmed by ultrasound), Membranes intact        LIMITED BEDSIDE US:  Position: Cephalic  Placental Location: anterior  Fetal Heart Tones: Present  Fetal Movement: Present   Amniotic Fluid Index/Volume: adequate 2x2 cm fluid pocket  Estimated Fetal Weight:  5#4 at [facility] 12/27    PRENATAL LAB RESULTS:   Blood Type/Rh: O pos  Antibody Screen: negative  Hemoglobin, Hematocrit, Platelets: 80.2/27.2/812  Rubella: immune  T.  Pallidum, IgG: non-reactive  Hepatitis B Surface Antigen: non-reactive   Hepatitis C Antibody: non-reactive   HIV: non-reactive   Gonorrhea: negative  Chlamydia: positive 3/30/22; negative 5/12/22 and 6/1/22 and 11/9/22 and 12/16/22   Urine culture: negative, date: 9/21/22, 12/16/22    1 hour Glucose Tolerance Test: not done    Group B Strep: negative on 12/16/22  Cystic Fibrosis Screen: negative  Sickle Cell Screen: negative  First Trimester Screen: low risk for aneuploidy  QUAD: not done  MSAFP: not done  Non-Invasive Prenatal Testing: not done  Anatomy US: anterior placenta, 3VC normal insertion, male gender, normal anatomy    ASSESSMENT & PLAN:  Steve Chairez is a 23 y.o. female  at 37w4d IUP   - GBS negative / Rh positive / R immune   - No indication for GBS prophylaxis    IOL  FGR (AC<3%) and cHTN (no meds)  - Admit to labor and delivery under the service of Dr. Jolene Garcia   - VSS, afebrile   - cEFM and TOCO   - Cat 1 FHT and TOCO showing irritability   - CBC, T&S, T.Pal ordered   - UDS ordered.  R/B/A discussed with patient and patient agreeable   - IVF: LR 406WC/BM   - BSUS: cephalic, EFW 5#4 at [facility]    - SVE: closed/thick/high   - Plan of Induction: Cervidil   - Continue expectant management     cHTN (no meds)   - BP normotensive on admission   - Denies any s/s of preeclampsia   - Baseline PreE labs wnl, P/C 0.14   - PreE labs pending   - Continue to monitor closely    Hx COVID-19   - Positive 22 at 23wks   - Denies any residual symptoms    Microcephaly of fetus   - Noted on MFM US    Hx Chlamydia (TOR neg)   - Positive 3/30/22  - Negative 22 and 22 and 22 and 22     Hx SAB x3     THC use    - UDS + 22 during pregnancy  - UDS pending  - Social work consult PP    BMI 34    Patient Active Problem List    Diagnosis Date Noted    37 weeks gestation of pregnancy 2022     Priority: Medium    Microcephaly of fetus 2022     Priority: Medium    COVID-19 2022     Priority: Medium    History of recurrent miscarriages (x3), currently pregnant 2022     Priority: Medium     2020: SAB  : SAB  : SAB      Exposure to genital herpes (current partner) 2022     Priority: Medium    FGR (AC <3rd%) 2022     MFM US  noted   MFM US  **      cHTN (no meds) 2022     /91 on   139/93 on       Chlamydia (TOR neg) 2022     3/2022: Treated with Azithromycin  TOR- see labs      High-risk pregnancy 2022     Risk factors: Obesity (BMI 30.17), chlamydia in pregnancy, COVID vaccine series not completed, recurrent miscarriage (x3)      Obesity (BMI 30.17) affecting pregnancy in first trimester 2022     Early one-hour glucose tolerance test ordered per protocol      COVID-19 vaccine series not completed 2022     Pt counseled on the the risks of not getting vaccinated in pregnancy against COVID-19. Pregnant women with symptomatic covid have a 70% increased risk of death. Covid-19 during pregnancy  increases the risk for adverse outcomes, including  birth and admission of the baby to an intensive care unit. Plan discussed with Dr. Ino De Souza, who is agreeable. Steroids given this admission: No    Risks, benefits, alternatives and possible complications have been discussed in detail with the patient. Admission, and post admission procedures and expectations were discussed in detail. All questions were answered.     Attending's Name: Dr. Jace Leroy DO  Ob/Gyn Resident  2022, 10:08 PM

## 2022-12-28 NOTE — FLOWSHEET NOTE
Lehman balloon placed per Dr. Aissatou Flor. 60 cc of normal saline placed in lehman balloon. Pt tolerated well.

## 2022-12-29 ENCOUNTER — ANESTHESIA EVENT (OUTPATIENT)
Dept: LABOR AND DELIVERY | Age: 22
End: 2022-12-29
Payer: COMMERCIAL

## 2022-12-29 ENCOUNTER — ANESTHESIA (OUTPATIENT)
Dept: LABOR AND DELIVERY | Age: 22
End: 2022-12-29
Payer: COMMERCIAL

## 2022-12-29 PROCEDURE — 2580000003 HC RX 258: Performed by: STUDENT IN AN ORGANIZED HEALTH CARE EDUCATION/TRAINING PROGRAM

## 2022-12-29 PROCEDURE — 6360000002 HC RX W HCPCS: Performed by: STUDENT IN AN ORGANIZED HEALTH CARE EDUCATION/TRAINING PROGRAM

## 2022-12-29 PROCEDURE — 6370000000 HC RX 637 (ALT 250 FOR IP)

## 2022-12-29 PROCEDURE — 2500000003 HC RX 250 WO HCPCS: Performed by: NURSE ANESTHETIST, CERTIFIED REGISTERED

## 2022-12-29 PROCEDURE — 88307 TISSUE EXAM BY PATHOLOGIST: CPT

## 2022-12-29 PROCEDURE — 51701 INSERT BLADDER CATHETER: CPT

## 2022-12-29 PROCEDURE — 7200000001 HC VAGINAL DELIVERY

## 2022-12-29 PROCEDURE — 1220000000 HC SEMI PRIVATE OB R&B

## 2022-12-29 PROCEDURE — 3700000025 EPIDURAL BLOCK: Performed by: STUDENT IN AN ORGANIZED HEALTH CARE EDUCATION/TRAINING PROGRAM

## 2022-12-29 PROCEDURE — 6360000002 HC RX W HCPCS

## 2022-12-29 PROCEDURE — 6360000002 HC RX W HCPCS: Performed by: NURSE ANESTHETIST, CERTIFIED REGISTERED

## 2022-12-29 PROCEDURE — 59409 OBSTETRICAL CARE: CPT | Performed by: OBSTETRICS & GYNECOLOGY

## 2022-12-29 RX ORDER — SIMETHICONE 80 MG
80 TABLET,CHEWABLE ORAL EVERY 6 HOURS PRN
Status: DISCONTINUED | OUTPATIENT
Start: 2022-12-29 | End: 2022-12-31 | Stop reason: HOSPADM

## 2022-12-29 RX ORDER — FERROUS SULFATE 325(65) MG
325 TABLET ORAL 2 TIMES DAILY
Qty: 60 TABLET | Refills: 0 | OUTPATIENT
Start: 2022-12-29

## 2022-12-29 RX ORDER — ONDANSETRON 2 MG/ML
4 INJECTION INTRAMUSCULAR; INTRAVENOUS EVERY 6 HOURS PRN
Status: DISCONTINUED | OUTPATIENT
Start: 2022-12-29 | End: 2022-12-29

## 2022-12-29 RX ORDER — NALBUPHINE HYDROCHLORIDE 20 MG/ML
10 INJECTION, SOLUTION INTRAMUSCULAR; INTRAVENOUS; SUBCUTANEOUS ONCE
Status: COMPLETED | OUTPATIENT
Start: 2022-12-29 | End: 2022-12-29

## 2022-12-29 RX ORDER — ROPIVACAINE HYDROCHLORIDE 2 MG/ML
INJECTION, SOLUTION EPIDURAL; INFILTRATION; PERINEURAL PRN
Status: DISCONTINUED | OUTPATIENT
Start: 2022-12-29 | End: 2022-12-29 | Stop reason: SDUPTHER

## 2022-12-29 RX ORDER — LANOLIN 72 %
OINTMENT (GRAM) TOPICAL PRN
Status: DISCONTINUED | OUTPATIENT
Start: 2022-12-29 | End: 2022-12-31 | Stop reason: HOSPADM

## 2022-12-29 RX ORDER — AMOXICILLIN 250 MG
1 CAPSULE ORAL DAILY
Qty: 60 TABLET | Refills: 0 | Status: SHIPPED | OUTPATIENT
Start: 2022-12-29 | End: 2023-02-27

## 2022-12-29 RX ORDER — BISACODYL 10 MG
10 SUPPOSITORY, RECTAL RECTAL DAILY PRN
Status: DISCONTINUED | OUTPATIENT
Start: 2022-12-29 | End: 2022-12-31 | Stop reason: HOSPADM

## 2022-12-29 RX ORDER — SODIUM CHLORIDE 0.9 % (FLUSH) 0.9 %
5-40 SYRINGE (ML) INJECTION EVERY 12 HOURS SCHEDULED
Status: DISCONTINUED | OUTPATIENT
Start: 2022-12-29 | End: 2022-12-31 | Stop reason: HOSPADM

## 2022-12-29 RX ORDER — ONDANSETRON 2 MG/ML
4 INJECTION INTRAMUSCULAR; INTRAVENOUS EVERY 4 HOURS PRN
Status: DISCONTINUED | OUTPATIENT
Start: 2022-12-29 | End: 2022-12-31 | Stop reason: HOSPADM

## 2022-12-29 RX ORDER — EPHEDRINE SULFATE/0.9% NACL/PF 50 MG/5 ML
10 SYRINGE (ML) INTRAVENOUS EVERY 5 MIN PRN
Status: DISCONTINUED | OUTPATIENT
Start: 2022-12-29 | End: 2022-12-29

## 2022-12-29 RX ORDER — IBUPROFEN 600 MG/1
600 TABLET ORAL EVERY 6 HOURS PRN
Qty: 60 TABLET | Refills: 0 | Status: SHIPPED | OUTPATIENT
Start: 2022-12-29

## 2022-12-29 RX ORDER — ACETAMINOPHEN 500 MG
1000 TABLET ORAL EVERY 6 HOURS
Status: DISCONTINUED | OUTPATIENT
Start: 2022-12-29 | End: 2022-12-31 | Stop reason: HOSPADM

## 2022-12-29 RX ORDER — SODIUM CHLORIDE 0.9 % (FLUSH) 0.9 %
5-40 SYRINGE (ML) INJECTION PRN
Status: DISCONTINUED | OUTPATIENT
Start: 2022-12-29 | End: 2022-12-31 | Stop reason: HOSPADM

## 2022-12-29 RX ORDER — IBUPROFEN 600 MG/1
600 TABLET ORAL EVERY 6 HOURS PRN
Status: DISCONTINUED | OUTPATIENT
Start: 2022-12-29 | End: 2022-12-31 | Stop reason: HOSPADM

## 2022-12-29 RX ORDER — NALOXONE HYDROCHLORIDE 0.4 MG/ML
INJECTION, SOLUTION INTRAMUSCULAR; INTRAVENOUS; SUBCUTANEOUS PRN
Status: DISCONTINUED | OUTPATIENT
Start: 2022-12-29 | End: 2022-12-29

## 2022-12-29 RX ORDER — DOCUSATE SODIUM 100 MG/1
100 CAPSULE, LIQUID FILLED ORAL 2 TIMES DAILY
Status: DISCONTINUED | OUTPATIENT
Start: 2022-12-29 | End: 2022-12-31 | Stop reason: HOSPADM

## 2022-12-29 RX ORDER — LIDOCAINE HYDROCHLORIDE AND EPINEPHRINE 15; 5 MG/ML; UG/ML
INJECTION, SOLUTION EPIDURAL PRN
Status: DISCONTINUED | OUTPATIENT
Start: 2022-12-29 | End: 2022-12-29 | Stop reason: SDUPTHER

## 2022-12-29 RX ORDER — SODIUM CHLORIDE 9 MG/ML
INJECTION, SOLUTION INTRAVENOUS PRN
Status: DISCONTINUED | OUTPATIENT
Start: 2022-12-29 | End: 2022-12-31 | Stop reason: HOSPADM

## 2022-12-29 RX ADMIN — SODIUM CHLORIDE, POTASSIUM CHLORIDE, SODIUM LACTATE AND CALCIUM CHLORIDE: 600; 310; 30; 20 INJECTION, SOLUTION INTRAVENOUS at 19:16

## 2022-12-29 RX ADMIN — ROPIVACAINE HYDROCHLORIDE 10 ML/HR: 2 INJECTION, SOLUTION EPIDURAL; INFILTRATION at 11:30

## 2022-12-29 RX ADMIN — ONDANSETRON 4 MG: 2 INJECTION INTRAMUSCULAR; INTRAVENOUS at 00:57

## 2022-12-29 RX ADMIN — ACYCLOVIR 400 MG: 200 CAPSULE ORAL at 14:03

## 2022-12-29 RX ADMIN — ROPIVACAINE HYDROCHLORIDE 5 ML: 2 INJECTION, SOLUTION EPIDURAL; INFILTRATION; PERINEURAL at 01:37

## 2022-12-29 RX ADMIN — NALBUPHINE HYDROCHLORIDE 10 MG: 20 INJECTION, SOLUTION INTRAMUSCULAR; INTRAVENOUS; SUBCUTANEOUS at 15:44

## 2022-12-29 RX ADMIN — ROPIVACAINE HYDROCHLORIDE 10 ML/HR: 2 INJECTION, SOLUTION EPIDURAL; INFILTRATION at 01:40

## 2022-12-29 RX ADMIN — Medication 166.7 ML: at 21:16

## 2022-12-29 RX ADMIN — LIDOCAINE HYDROCHLORIDE,EPINEPHRINE BITARTRATE 5 ML: 15; .005 INJECTION, SOLUTION EPIDURAL; INFILTRATION; INTRACAUDAL; PERINEURAL at 01:27

## 2022-12-29 RX ADMIN — Medication 1 MILLI-UNITS/MIN: at 10:17

## 2022-12-29 RX ADMIN — ACYCLOVIR 400 MG: 200 CAPSULE ORAL at 11:35

## 2022-12-29 ASSESSMENT — PAIN SCALES - GENERAL: PAINLEVEL_OUTOF10: 8

## 2022-12-29 NOTE — FLOWSHEET NOTE
Booker Nair crna to bedside    Unsuccessful troubleshooting.  States that he will send Radha Quintero crna for additional assessment

## 2022-12-29 NOTE — FLOWSHEET NOTE
Timeout [address] at bedside for epidural. Pt positioned sitting on side of bed. Consent previously obtained, time out performed 1735. Procedure [id number], catheter placement 9941 , test dose 1742. Pt positioned left lateral tilt. Pt tolerated well.  Rn will continue to monitor, see flow

## 2022-12-29 NOTE — FLOWSHEET NOTE
Care of this patient assumed after report at the doorside. Pt and spouse sleeping. Rn in to update whiteboard. Epidural intact and running. Iv intact, infusing. Pitocin off. Pt remains asleep.

## 2022-12-29 NOTE — PROGRESS NOTES
Labor Progress Note    Derek Nava is a 23 y.o. female  at 41w10d  The patient was seen and examined. Her pain is not well controlled since her epidural was pulled. Epidural re-ordered. She reports fetal movement is present, complains of contractions, complains of loss of fluid, denies vaginal bleeding.        Vital Signs:  Vitals:    22 1900 22 1930 22 1934 22 193   BP: (!) 140/76 103/83 103/83    Pulse: 68 62 62    Resp: 16 16 (P) 16    Temp: 98.2 °F (36.8 °C)      TempSrc: Oral      SpO2: 97% 97%  97%         FHT: 140, moderate variability, accelerations present, multiple prolonged decelerations noted with spontaneous return to baseline  Contractions: intermittent contractions    Chaperone for Intimate Exam: Chaperone was present for entire exam, Chaperone Name: Socrates Castanon RN  Cervical Exam: 6 cm dilated, 80 effaced, 0 station  Pitocin: @ 4 mu/min    Membranes: Ruptured clear fluid  Scalp Electrode in place: absent  Intrauterine Pressure Catheter in Place: absent    Interventions: SVE    Assessment/Plan:  Derek Nava is a 23 y.o. female  at 41w10d admitted for IOL 2/2 FGR (AC <3&) and cHTN (no meds   - GBS negative, No indication for GBS prophylaxis   - VSS, BP elevated non severe   - cEFM/TOCO: multiple prolonged decelerations noted   - Continue to monitor closely   - Epidural was pulled out and needs to be replaced   - Repeat epidural ordered   - s/p cervidil, cytotec 25 mcg PO x2, lehman balloon, AROM   - Pit @ 4 mu/min   - Continue pitocin per protocol   - IUPC in place   - Continue expectant managment        Attending updated and in agreement with plan    Claudio Clark MD  Ob/Gyn Resident  2022, 4:40 PM

## 2022-12-29 NOTE — ANESTHESIA PROCEDURE NOTES
Epidural Block    Patient location during procedure: OB  Start time: 12/29/2022 1:22 AM  End time: 12/29/2022 1:30 AM  Reason for block: labor epidural  Staffing  Performed: resident/CRNA   Anesthesiologist: Amelia Berman MD  Resident/CRNA: JAM Pablo CRNA  Epidural  Patient position: sitting  Prep: Betadine  Patient monitoring: continuous pulse ox and frequent blood pressure checks  Approach: midline  Location: L3-4  Injection technique: ELISE saline  Provider prep: mask and sterile gloves  Needle  Needle type: Tuohy   Needle gauge: 17 G  Needle length: 3.5 in  Needle insertion depth: 7 cm  Catheter type: side hole  Catheter size: 19 G  Catheter at skin depth: 13 cm  Test dose: negativeCatheter Secured: tegaderm and tape  Assessment  Hemodynamics: stable  Attempts: 1  Outcomes: uncomplicated and patient tolerated procedure well  Preanesthetic Checklist  Completed: patient identified, IV checked, site marked, risks and benefits discussed, surgical/procedural consents, equipment checked, pre-op evaluation, timeout performed, anesthesia consent given, oxygen available and monitors applied/VS acknowledged Past Medical History


Past Medical History:  No Pertinent History


Past Surgical History:  No Surgical History, Cholecystectomy


Additional Information:  


0.5 PPD


Alcohol Use:  None


Drug Use:  None





Adult General


Chief Complaint


Chief Complaint:  ABDOMINAL PAIN





HPI


HPI





Patient is a 56  year old male brought in by ambulance with abdominal pain.


Described as "grabbing" just above his bellybutton associated with nausea and 

vomiting he did have a bowel movement earlier in the day he has had his 

appendix and his gallbladder removed he is from near [facility]


He does not take any daily medications. He is up here for work.





Review of Systems


Review of Systems





Constitutional: Denies fever or chills []


Eyes: Denies change in visual acuity, redness, or eye pain []


HENT: Denies nasal congestion or sore throat []


Respiratory: Denies cough or shortness of breath []


Cardiovascular: No additional information not addressed in HPI []





Neurologic: Denies headache, focal weakness or sensory changes []


Endocrine: Denies polyuria or polydipsia []





All other systems were reviewed and found to be within normal limits, except as 

documented in this note.





Current Medications


Current Medications





Current Medications








 Medications


  (Trade)  Dose


 Ordered  Sig/Rose  Start Time


 Stop Time Status Last Admin


Dose Admin


 


 Diphenhydramine


 HCl


  (Benadryl)  25 mg  1X  ONCE  9/10/18 18:30


 9/10/18 18:31 DC 9/10/18 18:34


25 MG


 


 Fentanyl Citrate


  (Fentanyl 2ml


 Vial)  50 mcg  1X  ONCE  9/10/18 15:45


 9/10/18 15:46 DC 9/10/18 16:44


50 MCG


 


 Info


  (CONTRAST GIVEN


 -- Rx MONITORING)  1 each  PRN DAILY  PRN  9/10/18 15:45


 9/10/18 19:14 DC  





 


 Iohexol


  (Omnipaque 300


 Mg/ml)  75 ml  1X  ONCE  9/10/18 15:45


 9/10/18 15:46 DC 9/10/18 17:50


75 ML


 


 Methylprednisolone


 Sodium Succinate


  (SOLU-Medrol


 125MG VIAL)  125 mg  1X  ONCE  9/10/18 18:30


 9/10/18 18:31 DC 9/10/18 18:33


125 MG


 


 Multi-Ingredient


 Mouthwash/Gargle


  (Gi Cocktail)  20 ml  1X  ONCE  9/10/18 15:45


 9/10/18 15:46 DC 9/10/18 16:44


20 ML


 


 Sodium Chloride  1,000 ml @ 


 1,000 mls/hr  1X  ONCE  9/10/18 15:45


 9/10/18 16:44 DC 9/10/18 16:43


1,000 MLS/HR











Allergies


Allergies





Allergies








Coded Allergies Type Severity Reaction Last Updated Verified


 


  No Known Drug Allergies    9/10/18 No











Physical Exam


Physical Exam





Constitutional: Well developed, well nourished, no acute distress, non-toxic 

appearance. []


HENT: Normocephalic, atraumatic, bilateral external ears normal, oropharynx 

moist, no oral exudates, nose normal. []


Eyes: PERRLA, EOMI, conjunctiva normal, no discharge. [] 


Neck: Normal range of motion, no tenderness, supple, no stridor. [] 


Cardiovascular:Heart rate regular rhythm, no murmur []


Lungs & Thorax:  Bilateral breath sounds clear to auscultation []


Abdomen: Bowel sounds normal, soft,  epigastric and just above the umbilicus 

tenderness, no masses, no pulsatile masses. [] No peritoneal signs


Skin: Warm, dry, no erythema, no rash. [] 


Back: No tenderness, no CVA tenderness. [] 


Extremities: No tenderness, no cyanosis, no clubbing, ROM intact, no edema. [] 


Neurologic: Alert and oriented X 3, normal motor function, normal sensory 

function, no focal deficits noted. []


Psychologic: Affect normal, judgement normal, mood normal. []





Current Patient Data


Vital Signs





 Vital Signs








  Date Time  Temp Pulse Resp B/P (MAP) Pulse Ox O2 Delivery O2 Flow Rate FiO2


 


9/10/18 19:00  74  162/96 (118) 95 Room Air  


 


9/10/18 18:13   19     


 


9/10/18 15:15 98.2       





 98.2       








Lab Values





 Laboratory Tests








Test


 9/10/18


16:40 9/10/18


17:43


 


White Blood Count


 14.4 x10^3/uL


(4.0-11.0)  H 





 


Red Blood Count


 4.59 x10^6/uL


(4.30-5.70) 





 


Hemoglobin


 16.1 g/dL


(13.0-17.5) 





 


Hematocrit


 45.7 %


(39.0-53.0) 





 


Mean Corpuscular Volume


 100 fL


() 





 


Mean Corpuscular Hemoglobin 35 pg (25-35)   


 


Mean Corpuscular Hemoglobin


Concent 35 g/dL


(31-37) 





 


Red Cell Distribution Width


 14.3 %


(11.5-14.5) 





 


Platelet Count


 303 x10^3/uL


(140-400) 





 


Neutrophils (%) (Auto) 79 % (31-73)  H 


 


Lymphocytes (%) (Auto) 13 % (24-48)  L 


 


Monocytes (%) (Auto) 7 % (0-9)   


 


Eosinophils (%) (Auto) 1 % (0-3)   


 


Basophils (%) (Auto) 1 % (0-3)   


 


Neutrophils # (Auto)


 11.4 x10^3uL


(1.8-7.7)  H 





 


Lymphocytes # (Auto)


 1.9 x10^3/uL


(1.0-4.8) 





 


Monocytes # (Auto)


 0.9 x10^3/uL


(0.0-1.1) 





 


Eosinophils # (Auto)


 0.1 x10^3/uL


(0.0-0.7) 





 


Basophils # (Auto)


 0.1 x10^3/uL


(0.0-0.2) 





 


Sodium Level


 138 mmol/L


(136-145) 





 


Potassium Level


 4.0 mmol/L


(3.5-5.1) 





 


Chloride Level


 101 mmol/L


() 





 


Carbon Dioxide Level


 31 mmol/L


(21-32) 





 


Anion Gap 6 (6-14)   


 


Blood Urea Nitrogen


 5 mg/dL (8-26)


L 





 


Creatinine


 0.9 mg/dL


(0.7-1.3) 





 


Estimated GFR


(Cockcroft-Gault) 87.9  


 





 


BUN/Creatinine Ratio 6 (6-20)   


 


Glucose Level


 106 mg/dL


(70-99)  H 





 


Calcium Level


 9.2 mg/dL


(8.5-10.1) 





 


Total Bilirubin


 0.3 mg/dL


(0.2-1.0) 





 


Aspartate Amino Transferase


(AST) 16 U/L (15-37)


 





 


Alanine Aminotransferase (ALT)


 21 U/L (16-63)


 





 


Alkaline Phosphatase


 123 U/L


()  H 





 


Troponin I Quantitative


 < 0.017 ng/mL


(0.000-0.055) 





 


Total Protein


 7.8 g/dL


(6.4-8.2) 





 


Albumin


 3.4 g/dL


(3.4-5.0) 





 


Albumin/Globulin Ratio


 0.8 (1.0-1.7)


L 





 


Lipase


 204 U/L


() 





 


Urine Collection Type  Void  


 


Urine Color  Yellow  


 


Urine Clarity  Clear  


 


Urine pH  7.5  


 


Urine Specific Gravity  1.010  


 


Urine Protein


 


 Negative mg/dL


(NEG-TRACE)


 


Urine Glucose (UA)


 


 Negative mg/dL


(NEG)


 


Urine Ketones (Stick)


 


 Negative mg/dL


(NEG)


 


Urine Blood


 


 Negative (NEG)





 


Urine Nitrite


 


 Negative (NEG)





 


Urine Bilirubin


 


 Negative (NEG)





 


Urine Urobilinogen Dipstick


 


 1.0 mg/dL (0.2


mg/dL)


 


Urine Leukocyte Esterase


 


 Negative (NEG)





 


Urine RBC  0 /HPF (0-2)  


 


Urine WBC


 


 Occ /HPF (0-4)





 


Urine Squamous Epithelial


Cells 


 Occ /LPF  





 


Urine Bacteria


 


 Few /HPF


(0-FEW)


 


Urine Mucus  Slight /LPF  





 Laboratory Tests


9/10/18 16:40








 Laboratory Tests


9/10/18 16:40











EKG


EKG


[]





Radiology/Procedures


Radiology/Procedures


[]PROCEDURE: CT ABD PELV W/ IV CONTRST ONLY





PQRS Compliance statement:


 


One or more of the following individualized dose reduction techniques were


utilized for this examination:


1. Automated exposure control.


2. Adjustment of the mA and/or kV according to patient size.


3. Use of iterative reconstruction technique.


 


Indication:Abdominal pain. Evaluate for small bowel obstruction.


 


TECHNIQUE: CT abdomen and pelvis with IV contrast with multiplanar 


reformats.


 


COMPARISON: None


 


FINDINGS:


Heart is normal in size. No pericardial or pleural effusion. Motion 


artifact is seen in the lung bases limiting optimal evaluation. Motion 


artifact is also seen in the upper abdomen limiting optimal evaluation. 


1.1 cm low attenuating lesion is seen in segment 5 of the liver. 


Otherwise, liver, spleen, pancreas, adrenals and kidneys are grossly 


within normal limits. No enlarged retroperitoneal or pelvic adenopathy. 


Small fat-containing right inguinal hernia. No free pelvic fluid or 


ascites. No bowel obstruction. The prostate and seminal vesicles show no 


large mass. Urinary bladder is within normal limits. No pneumatosis 


intestinalis or pneumoperitoneum. There is mild circumferential wall 


thickening of the distal esophagus. Mild circumferential wall thickening 


of the distal duodenum and jejunum. Incidental note made of retroaortic 


left renal vein. No suspicious bony lesion.


 


IMPRESSION:


Motion artifact is seen in the lung bases and upper abdomen limiting 


optimal evaluation.


1. Mild circumferential wall thickening of the distal duodenum and jejunum


may be secondary to enteritis. No evidence of bowel obstruction.


2. Indeterminate right hepatic lobe lesion. Differential diagnoses 


includes cystic biliary hamartoma or hemangioma in absence of known 


primary malignancy. Either direct comparison with outside/prior imaging or


nonemergent MRI of the abdomen recommended for definite confirmation.


Impressions:


EKG shows a normal sinus rhythm rate of 67 no obvious ischemic changes 

intervals are normal no STEMI





Course & Med Decision Making


Course & Med Decision Making


Pertinent Labs and Imaging studies reviewed. (See chart for details)





[]Mid abdominal pain for the last several hours status post cholecystectomy 

white blood count was mildly elevated CT scan is currently pending. Lipase and 

LFTs were normal








Waiting on CT and urinalysis. Care to oncoming provider





Discussed imaging and lab findings with patient. Patient's pain improved. 

States he is feeling much better. On reexamination, abdomen is soft nontender 

nondistended. No peritoneal signs. Tolerating by mouth. Discussed follow-up for 

nonemergent outpatient MRI following CT report results. Patient states he will 

follow up with his primary care doctor when he returns to [employer]. 

Discussed symptomatic treatment. We'll treat outpatient with Cipro, Flagyl and 

Zofran. Discussed reasons to return to the ED. Patient understands and agrees 

with plan.





Dragon Disclaimer


Dragon Disclaimer


This electronic medical record was generated, in whole or in part, using a 

voice recognition dictation system.





Departure


Departure


Impression:  


 Primary Impression:  


 Enteritis


Disposition:  01 HOME, SELF-CARE


Condition:  IMPROVED


Referrals:  


UNKNOWN PCP NAME (PCP)








SU DE LA TORRE MD, SCOTT S MD


Patient Instructions:  Colitis, Diarrhea


Scripts


Ondansetron (ZOFRAN ODT) 4 Mg Tab.rapdis


1 TAB SL Q8HRS, #10 TAB


   Prov: DAVID BROWN         9/10/18 


Metronidazole (FLAGYL) 500 Mg Tablet


500 MG PO TID for 7 Days, #21 TAB


   Prov: DAVID BROWN         9/10/18 


Ciprofloxacin Hcl (CIPRO) 500 Mg Tablet


1 TAB PO BID for 7 Days, #14 TAB


   Prov: DAVID BROWN         9/10/18











SINAN DASILVA MD Sep 10, 2018 15:35


DAVID BROWN Sep 10, 2018 18:15

## 2022-12-29 NOTE — PROGRESS NOTES
Obstetric/Gynecology Resident Interval Note    Patient seen and evaluated. SVE 4/70/-1 unchanged from prior per RN. FHT significant for 4 minute prolonged deceleration with amparo 90. Fluid bolus initiated, pitocin discontinued and position changes ongoing. Will continue to monitor closely.      Link Butler DO  OB/GYN Resident, PGY2  [facility]  12/29/2022, 5:51 AM

## 2022-12-29 NOTE — ANESTHESIA PRE PROCEDURE
Department of Anesthesiology  Preprocedure Note       Name:  Kenny Posey   Age:  23 y.o.  :  2000                                          MRN:  [id number]         Date:  2022      Surgeon: Kira Perez    Procedure: Labor Epidural    Medications prior to admission:   Prior to Admission medications    Medication Sig Start Date End Date Taking?  Authorizing Provider   sertraline (ZOLOFT) 25 MG tablet Take 1 tablet by mouth daily 22   Clarita Pipes, DO   potassium chloride (KLOR-CON M) 10 MEQ extended release tablet Take 1 tablet by mouth 2 times daily for 4 days 22  Clarita Pipes, DO   ferrous sulfate (FE TABS) 325 (65 Fe) MG EC tablet Take 1 tablet by mouth 2 times daily 22   Clarita Pipes, DO   ondansetron (ZOFRAN) 4 MG tablet Take 1 tablet by mouth daily as needed for Nausea or Vomiting 22   Robert Russell, DO   aspirin EC 81 MG EC tablet Take 1 tablet by mouth daily 22   Birdie Jaffe, DO   vitamin B-6 (PYRIDOXINE) 50 MG tablet Take 0.5 tablets by mouth in the morning, at noon, and at bedtime 22   Nataly Siu MD   Prenatal Vit-Fe Fumarate-FA (PRENATAL VITAMIN) 27-0.8 MG TABS Take 1 tablet by mouth daily 22   Darryl Benitez MD       Current medications:    Current Facility-Administered Medications   Medication Dose Route Frequency Provider Last Rate Last Admin    acyclovir (ZOVIRAX) capsule 400 mg  400 mg Oral TID Sloane Santoro MD   400 mg at 22    oxytocin (PITOCIN) 30 units in 500 mL infusion  1-20 german-units/min IntraVENous Continuous Rios Rosamaria, DO 4 mL/hr at 22 0012 4 german-units/min at 22 001    ropivacaine (NAROPIN) 0.2% injection 0.2%             lactated ringers infusion   IntraVENous Continuous Rios Rosamaria,  mL/hr at 22 0012 Rate Verify at 22 0012    lactated ringers bolus  500 mL IntraVENous PRN Rios Rosamaria, DO        Or    lactated ringers bolus  1,000 mL IntraVENous PRN [facility] Shalini, DO        sodium chloride flush 0.9 % injection 5-40 mL  5-40 mL IntraVENous 2 times per day Christopher Hidalgo, DO        sodium chloride flush 0.9 % injection 5-40 mL  5-40 mL IntraVENous PRN Christopher Hidalgo, DO        0.9 % sodium chloride infusion  25 mL IntraVENous PRN Christopher Hidalgo, DO        ondansetron (ZOFRAN) injection 4 mg  4 mg IntraVENous Q6H PRN Christopher Hidalgo, DO        diphenhydrAMINE (BENADRYL) tablet 25 mg  25 mg Oral Q4H PRN Christopher Hidalgo, DO        acetaminophen (TYLENOL) tablet 1,000 mg  1,000 mg Oral Q6H PRN Christopher Hidalgo, DO   1,000 mg at 12/28/22 0550    benzocaine-menthol (DERMOPLAST) 20-0.5 % spray   Topical PRN Christopher Hidalgo, DO        docusate sodium (COLACE) capsule 100 mg  100 mg Oral BID Christopher Hidalgo, DO           Allergies:  No Known Allergies    Problem List:    Patient Active Problem List   Diagnosis Code    History of recurrent miscarriages (x3), currently pregnant N96    Chlamydia (TOR neg) A74.9    Exposure to genital herpes (current partner) Z20.2    High-risk pregnancy O09.90    Obesity (BMI 30.17) affecting pregnancy in first trimester O99.211    COVID-19 vaccine series not completed Z28.9, Z28.311    cHTN (no meds) O10.919    COVID-19 U07.1    Microcephaly of fetus O35. 36X0    FGR (AC <3rd%) O36.5990    37 weeks gestation of pregnancy Z3A.37       Past Medical History:        Diagnosis Date    BV (bacterial vaginosis)     Chlamydia     3/22; ToR 5/22, 6/22    Chlamydia infection 07/2021    Chlamydia infection affecting pregnancy 3/2022, 7/2021    ToR 5/2022, 6/2022    cHTN (no meds) 6/30/2022    Closed fracture of phalanx of middle finger 2016    Gonorrhea 07/2021    History of blood transfusion     Transfused after SAB 2021    Seasonal allergies     Strep throat     Trichomonas infection 02/2022    Trichomonas vaginitis 02/2021       Past Surgical History:  No past surgical history on file.     Social History:    Social History     Tobacco Use    Smoking status: Never     Passive exposure: Yes    Smokeless tobacco: Never   Substance Use Topics    Alcohol use: Not Currently     Comment: Not during pregnancy (last drink summer 2021)                                Counseling given: Not Answered      Vital Signs (Current):   Vitals:    12/28/22 1622 12/28/22 2059 12/28/22 2201 12/28/22 2254   BP: (!) 149/93 136/87 128/79 132/84   Pulse: (!) 113 87 87 83   Resp: 16 18 16 18   Temp: 36.7 °C (98.1 °F) 37.1 °C (98.7 °F)  37 °C (98.6 °F)   TempSrc: Oral Oral  Oral   SpO2:  98%                                                BP Readings from Last 3 Encounters:   12/28/22 132/84   12/27/22 125/74   12/16/22 132/82       NPO Status:                                                                                 BMI:   Wt Readings from Last 3 Encounters:   12/27/22 186 lb (84.4 kg)   12/12/22 186 lb (84.4 kg)   12/05/22 189 lb 6 oz (85.9 kg)     There is no height or weight on file to calculate BMI.    CBC:   Lab Results   Component Value Date/Time    WBC 8.1 12/27/2022 11:03 PM    RBC 4.02 12/27/2022 11:03 PM    HGB 9.9 12/27/2022 11:03 PM    HCT 31.5 12/27/2022 11:03 PM    MCV 78.4 12/27/2022 11:03 PM    RDW 14.3 12/27/2022 11:03 PM     12/27/2022 11:03 PM       CMP:   Lab Results   Component Value Date/Time     12/27/2022 11:03 PM    K 3.9 12/27/2022 11:03 PM    CL 99 12/27/2022 11:03 PM    CO2 19 12/27/2022 11:03 PM    BUN 10 12/27/2022 11:03 PM    CREATININE 0.46 12/27/2022 11:03 PM    GFRAA >60 09/22/2022 06:48 AM    LABGLOM >60 12/27/2022 11:03 PM    GLUCOSE 110 12/27/2022 11:03 PM    PROT 6.9 12/27/2022 11:03 PM    CALCIUM 9.0 12/27/2022 11:03 PM    BILITOT <0.1 12/27/2022 11:03 PM    ALKPHOS 403 12/27/2022 11:03 PM    AST 14 12/27/2022 11:03 PM    ALT 9 12/27/2022 11:03 PM       POC Tests: No results for input(s): POCGLU, POCNA, POCK, POCCL, POCBUN, POCHEMO, POCHCT in the last 72 hours.     Coags:   Lab Results   Component Value Date/Time    PROTIME 9.6 2022 06:48 AM    INR 0.9 2022 06:48 AM    APTT 29.5 2022 06:48 AM       HCG (If Applicable):   Lab Results   Component Value Date    PREGTESTUR NEGATIVE 2022    HCGQUANT 101,150 (H) 2022        ABGs: No results found for: PHART, PO2ART, PXP7ITH, WZR1CPG, BEART, J0JHWJTM     Type & Screen (If Applicable):  No results found for: LABABO, LABRH    Drug/Infectious Status (If Applicable):  Lab Results   Component Value Date/Time    HEPCAB NONREACTIVE 2022 09:10 AM       COVID-19 Screening (If Applicable):   Lab Results   Component Value Date/Time    COVID19 DETECTED 2022 09:36 AM           Anesthesia Evaluation   no history of anesthetic complications:   Airway: Mallampati: II  TM distance: >3 FB   Neck ROM: full  Mouth opening: > = 3 FB   Dental: normal exam         Pulmonary:Negative Pulmonary ROS and normal exam                              ROS comment: Hx covid   Cardiovascular:    (+) hypertension (chronic):,         Rhythm: regular  Rate: normal                    Neuro/Psych:   Negative Neuro/Psych ROS              GI/Hepatic/Renal:   (+) GERD:,           Endo/Other:    (+) blood dyscrasia: anemia:., .                 Abdominal:             Vascular: negative vascular ROS. Other Findings:  uterus          Anesthesia Plan      epidural     ASA 2     (Platelets 580)        Anesthetic plan and risks discussed with patient. Use of blood products discussed with patient whom. Plan discussed with attending.                     JAM Membreno - LENKA   2022

## 2022-12-29 NOTE — FLOWSHEET NOTE
Late entry due to care. 1 RN attempt to phone additional anesthesia as no one has returned to assess pt line status. 01-22 Na134 mmol/L<L> Glu 95 mg/dL K+ 3.4 mmol/L<L> Cr  1.54 mg/dL<H> BUN 42.3 mg/dL<H> Phos n/a   Alb n/a   PAB n/a

## 2022-12-29 NOTE — PROGRESS NOTES
OBGYN Labor Progress Note    Arturo Thomson is a 23 y.o. female  at 37w6d  The patient was seen and examined. Her pain is well controlled. She reports fetal movement is present, complains of contractions, denies loss of fluid, denies vaginal bleeding. Notified by RN that lehman balloon was displaced without difficulty.      Vital Signs:  Vitals:    22 0800 22 1147 22 1622 22 2059   BP: 131/89 (!) 138/91 (!) 149/93 136/87   Pulse: 96 80 (!) 113 87   Resp: 18 16 16 18   Temp: 98.1 °F (36.7 °C) 98.2 °F (36.8 °C) 98.1 °F (36.7 °C) 98.7 °F (37.1 °C)   TempSrc: Oral Oral Oral Oral   SpO2:    98%       FHT: 130, moderate variability, accelerations present, decelerations absent  Contractions: irregular, every 5 minutes    Examination chaperoned by Nae Mcfarland RN    Cervical Exam: 4 cm dilated, 50% effaced, -2 station  Pitocin: @ 0 mu/min    Membranes: Intact  Scalp Electrode in place: absent  Intrauterine Pressure Catheter in Place: absent    Interventions: SVE    Assessment/Plan:  Arturo Thomson is a 23 y.o. female  at 37w5d IUP   - GBS negative / Rh positive / R immune   - No indication for GBS prophylaxis    IOL 2/2 FGR (AC<3%) and cHTN (no meds)   - VSS   - cEFM and TOCO   - IVF:  mL/hr   - S/p Cervidil x1   - S/p Cytotec 25mcg PO x2   - S/p lehman balloon   - Morphine/compazine x1   - Pitocin ordered   - Continue expectant management      Attending updated and in agreement with plan    DO TERESA Rg Resident  2022, 9:26 PM

## 2022-12-29 NOTE — FLOWSHEET NOTE
See flow for sve after pt call out for additional pressure. Pt wishes to re insert new epidural. Anesthesia in stat section. To insert as soon as available.

## 2022-12-29 NOTE — FLOWSHEET NOTE
Pt states that she is involuntarily pushing. Rn sve unchanged from previous check. States that she wants her internal catheter and lehman catheter out.  Rn educates on importance of devices

## 2022-12-29 NOTE — FLOWSHEET NOTE
Pt calls out, rn to room for evaluation of epidural. Pt has ripped wiring apart. Anesthesia paged in regards to line care.

## 2022-12-29 NOTE — PROGRESS NOTES
OBGYN Labor Progress Note    Whitney Diaz is a 23 y.o. female  at 37w6d  The patient was seen and examined. Her pain is well controlled with epidural. She reports fetal movement is present, complains of contractions, denies loss of fluid, denies vaginal bleeding. Patient agreeable to SVE and IUPC placement.     Vital Signs:  Vitals:    22 0141 22 0147 22 0156 22 0201   BP: 124/65 (!) 125/59 126/74 112/63   Pulse: 70 65 60 57   Resp: 16 16 18 17   Temp:       TempSrc:       SpO2: 99% 98% 98% 99%       FHT: 140, moderate variability, accelerations present, 1 prolonged deceleration noted lasting 4 minutes with spontaneous return to baseline   Contractions: irregular, difficult to trace    Examination chaperoned by Yanna Spence RN    Cervical Exam: 4 cm dilated, 70% effaced, -1 station  Pitocin: @ 8 mu/min    Membranes: Ruptured clear fluid at 2238  Scalp Electrode in place: absent  Intrauterine Pressure Catheter in Place: absent    Interventions: SVE and IUPC placed without difficulty    Assessment/Plan:  Whitney Diaz is a 23 y.o. female  at 37w5d IUP   - GBS negative / Rh positive / R immune   - No indication for GBS prophylaxis    IOL 2/2 FGR (AC<3%) and cHTN (no meds)   - VSS, afebrile   - BP normotensive   - Denies any s/s of PreE   - PreE labs wnl, P/C 0.13   - cEFM and TOCO   - IVF:  mL/hr   - S/p Cervidil x1   - S/p Cytotec 25mcg PO x2   - S/p lehman balloon   - Morphine/compazine x1   - Pitocin per protocol   - AROM clear fluid at 2238   - Epidural in place and functioning well   - IUPC placed    - Continue expectant management    HSV exposure   - Patient's partner has active genital HSV outbreak   - Patient denies any prodromal symptoms   - SSE negative on admission   - Acyclovir 400mg TID for suppression    Anemia   - Hgb 9.9 on admission   - Clinically asymptomatic    - Iron supplementation on discharge    THC Use   - UDS + on admission   - Social work consult post partum    BMI 34      Attending updated and in agreement with plan    [place] Cover, DO  OBGYN Resident  12/29/2022, 2:55 AM

## 2022-12-29 NOTE — FLOWSHEET NOTE
Gentle traction applied to lehman balloon. Lehman balloon out. Pt tolerated well. Dr. Sulaiman Rodgers notified.

## 2022-12-29 NOTE — ANESTHESIA PROCEDURE NOTES
Epidural Block    Patient location during procedure: OB  Start time: 12/29/2022 5:36 PM  End time: 12/29/2022 5:46 PM  Reason for block: labor epidural  Staffing  Performed: resident/CRNA   Resident/CRNA: JAM Rubio CRNA  Epidural  Patient position: sitting  Prep: Betadine  Patient monitoring: continuous pulse ox and frequent blood pressure checks  Approach: midline  Location: L3-4  Injection technique: ELISE air  Guidance: paresthesia technique  Provider prep: mask and sterile gloves  Needle  Needle type: Tuohy   Needle gauge: 17 G  Needle length: 3.5 in  Needle insertion depth: 7 cm  Catheter type: multi-orifice  Catheter size: 19 G  Catheter at skin depth: 13 cm  Test dose: negativeCatheter Secured: tegaderm and tape  Assessment  Sensory level: T6  Hemodynamics: stable  Attempts: 1  Outcomes: uncomplicated and patient tolerated procedure well  Preanesthetic Checklist  Completed: patient identified, IV checked, risks and benefits discussed, equipment checked, pre-op evaluation, timeout performed, anesthesia consent given, oxygen available and monitors applied/VS acknowledged

## 2022-12-29 NOTE — FLOWSHEET NOTE
Rn speaks with dr Bianca Allred regarding pt condition. Orders to reassess pain post nubain. Will continue to monitor and update.

## 2022-12-29 NOTE — FLOWSHEET NOTE
POC dicussed with dr Allen Dillard after review of tracing. Order to restart pitocin by 1's. Pt agreeable. See EMAR. Pt remains in high [place]/ throne position. Denies needs.

## 2022-12-29 NOTE — PROGRESS NOTES
OBGYN Labor Progress Note    Dalton Parr is a 23 y.o. female  at 37w6d  The patient was seen and examined. Her pain is well controlled. She reports fetal movement is present, complains of contractions, denies loss of fluid, denies vaginal bleeding. Patient agreeable to SVE and AROM.      Vital Signs:  Vitals:    22 1147 22 1622 22 2059 22 2201   BP: (!) 138/91 (!) 149/93 136/87 128/79   Pulse: 80 (!) 113 87 87   Resp: 16 16 18 16   Temp: 98.2 °F (36.8 °C) 98.1 °F (36.7 °C) 98.7 °F (37.1 °C)    TempSrc: Oral Oral Oral    SpO2:   98%        FHT: 135, moderate variability, accelerations present, decelerations absent  Contractions: irregular, every 5 minutes    Examination chaperoned by Rui Hdz RN    Cervical Exam: 4 cm dilated, 50% effaced, -2 station  Pitocin: @ 0 mu/min    Membranes: Ruptured clear fluid at 2238  Scalp Electrode in place: absent  Intrauterine Pressure Catheter in Place: absent    Interventions: SVE and AROM clear fluid at 2238    Assessment/Plan:  Dalton Parr is a 23 y.o. female  at 37w5d IUP   - GBS negative / Rh positive / R immune   - No indication for GBS prophylaxis    IOL 2/2 FGR (AC<3%) and cHTN (no meds)   - VSS   - cEFM and TOCO   - IVF:  mL/hr   - S/p Cervidil x1   - S/p Cytotec 25mcg PO x2   - S/p lehman balloon   - Morphine/compazine x1   - Pitocin per protocol   - AROM clear fluid at 2238   - Epidural whenever patient desires    - Continue expectant management      Attending updated and in agreement with plan    Maryan Santiago DO  OBGYN Resident  2022, 10:54 PM

## 2022-12-29 NOTE — FLOWSHEET NOTE
Sae Wang, CRNA at bedside. 0110 positioned for epidural  0126 catheter placed. 0127 test dose given. 0137 loading dose given. 0134 positioned to low fowlers with left uterine displacement. 0140 pump initiated.

## 2022-12-30 PROCEDURE — 99024 POSTOP FOLLOW-UP VISIT: CPT | Performed by: OBSTETRICS & GYNECOLOGY

## 2022-12-30 PROCEDURE — 10907ZC DRAINAGE OF AMNIOTIC FLUID, THERAPEUTIC FROM PRODUCTS OF CONCEPTION, VIA NATURAL OR ARTIFICIAL OPENING: ICD-10-PCS | Performed by: OBSTETRICS & GYNECOLOGY

## 2022-12-30 PROCEDURE — 2580000003 HC RX 258: Performed by: STUDENT IN AN ORGANIZED HEALTH CARE EDUCATION/TRAINING PROGRAM

## 2022-12-30 PROCEDURE — 6370000000 HC RX 637 (ALT 250 FOR IP): Performed by: STUDENT IN AN ORGANIZED HEALTH CARE EDUCATION/TRAINING PROGRAM

## 2022-12-30 PROCEDURE — 1220000000 HC SEMI PRIVATE OB R&B

## 2022-12-30 PROCEDURE — 10H07YZ INSERTION OF OTHER DEVICE INTO PRODUCTS OF CONCEPTION, VIA NATURAL OR ARTIFICIAL OPENING: ICD-10-PCS | Performed by: OBSTETRICS & GYNECOLOGY

## 2022-12-30 RX ADMIN — SODIUM CHLORIDE, PRESERVATIVE FREE 10 ML: 5 INJECTION INTRAVENOUS at 08:10

## 2022-12-30 RX ADMIN — ACETAMINOPHEN 1000 MG: 500 TABLET ORAL at 04:46

## 2022-12-30 RX ADMIN — DOCUSATE SODIUM 100 MG: 100 CAPSULE ORAL at 10:28

## 2022-12-30 RX ADMIN — ACETAMINOPHEN 1000 MG: 500 TABLET ORAL at 10:30

## 2022-12-30 ASSESSMENT — PAIN DESCRIPTION - FREQUENCY
FREQUENCY: CONTINUOUS
FREQUENCY: CONTINUOUS

## 2022-12-30 ASSESSMENT — PAIN SCALES - GENERAL
PAINLEVEL_OUTOF10: 4
PAINLEVEL_OUTOF10: 2
PAINLEVEL_OUTOF10: 1

## 2022-12-30 ASSESSMENT — PAIN DESCRIPTION - LOCATION: LOCATION: BACK

## 2022-12-30 ASSESSMENT — PAIN DESCRIPTION - ORIENTATION: ORIENTATION: MID;LOWER

## 2022-12-30 ASSESSMENT — PAIN DESCRIPTION - DESCRIPTORS: DESCRIPTORS: ACHING

## 2022-12-30 ASSESSMENT — PAIN - FUNCTIONAL ASSESSMENT
PAIN_FUNCTIONAL_ASSESSMENT: ACTIVITIES ARE NOT PREVENTED
PAIN_FUNCTIONAL_ASSESSMENT: ACTIVITIES ARE NOT PREVENTED

## 2022-12-30 ASSESSMENT — PAIN DESCRIPTION - PAIN TYPE
TYPE: ACUTE PAIN
TYPE: ACUTE PAIN

## 2022-12-30 NOTE — PROGRESS NOTES
POST PARTUM DAY # 1    Arturo Thomson is a 62[address] [facility], [address] y.o. female  This patient was seen & examined today.  on 22    Her pregnancy was complicated by:   Patient Active Problem List   Diagnosis    History of recurrent miscarriages (x3), currently pregnant    Chlamydia (TOR neg)    Exposure to genital herpes (current partner)    High-risk pregnancy    Obesity (BMI 30.17) affecting pregnancy in first trimester    COVID-19 vaccine series not completed    cHTN (no meds)    COVID-19    Microcephaly of fetus    FGR (AC <3rd%)    37 weeks gestation of pregnancy     24 M Apg 8/9 Wt 5#10       Today she is doing well without any chief complaint. Her lochia is light. She denies chest pain, shortness of breath, headache, lightheadedness and blurred vision. She is bottle feeding and she denies any breast tenderness. She is ambulating well. Her voiding pattern is normal. I reviewed signs and symptoms of post partum depression with the patient, she currently denies any of these symptoms. She is tolerating solids.      Vital Signs:  Vitals:    22 2246 22 2302 22 2322 22 2345   BP: (!) 151/86 137/77 132/82 129/82   Pulse: 66 68 64 82   Resp: 16 16 16 18   Temp:  98.9 °F (37.2 °C)  98.8 °F (37.1 °C)   TempSrc:  Oral  Oral   SpO2:  98%  99%         Physical Exam:  General:  no apparent distress, alert and cooperative  Neurologic:  alert, oriented, normal speech, no focal findings or movement disorder noted  Lungs:  No increased work of breathing, good air exchange, clear to auscultation bilaterally, no crackles or wheezing  Heart:  Normal apical impulse, regular rate and rhythm, normal S1 and S2, no S3 or S4, and no murmur noted    Abdomen: abdomen soft, non-distended, non-tender  Fundus: non-tender, firm, below umbilicus  Extremities:  no calf tenderness, non edematous    Lab:  Lab Results   Component Value Date    HGB 9.9 (L) 2022     Lab Results   Component Value Date    HCT 31.5 (L) 2022       Assessment/Plan:  Hodan Renteria is a  PPD # 1 s/p    - Doing well, some elevated blood pressures, no severe ranges   - male infant in General Care Nursery, circumcision desired   - Encourage ambulation   - Motrin/Tylenol for pain control  Rh positive/Rubella immune  Bottle feeding  cHTN (no meds)  - Blood pressures elevated, no severe ranges  - Denies s/s preeclampsia  - PreE labs wnl, P/C 0.13   - Continue to monitor  Anemia  - Hgb 9.9 on admission  - PO iron on discharge  - Clinically asymptomatic  THC use  - UDS positive  - SW consult PP  Continue post partum care    Counseling Completed:  Secondary Smoke risks and Sudden Infant Death Syndrome were reviewed with recommendations. Infant sleeping, \"back to sleep\" and avoidance of co-sleeping recommendations were reviewed.  Signs and Symptoms of Post Partum Depression were reviewed. The patient is to call if any occur.  Signs and symptoms of Mastitis were reviewed. The patient is to call if any occur for follow up.  Discharge instructions including pelvic rest, no driving with pain medicine and office follow-up were reviewed with patient     Attending Physician: Dr. Samina Gallardo DO  Ob/Gyn Resident   2022, 2:15 AM           Attending Physician Statement  I have discussed the care of Hodan Renteria, including pertinent history and exam findings,  with the resident. I have seen and examined the patient and the key elements of all parts of the encounter have been performed by me.  I agree with the assessment, plan and orders as documented by the resident.  (GC Modifier)    Roman Haas DO

## 2022-12-30 NOTE — DISCHARGE INSTRUCTIONS
Follow-up with your OB doctor in 2 weeks or as specified by your physician. Please refer to A NEW BEGINNING- YOUR PERSONAL GUIDE TO POSTPARTUM CARE book provided in your room. Please take this book home with you to refer to. For Breastfeeding moms, you can contact our lactation specialist,  with any problems or questions you may have. Phone number [phone]. Feel free to leave voice mail and your call will be returned as soon as possible. DIET  Eat a well balanced diet focusing on foods high in fiber and protein. Drink 8-10 glasses of fluids daily, especially water. To avoid constipation you may take a mild stool softener as recommended by your doctor or midwife. ACTIVITY  Gradually increase your activity. Resume exercise regimen only after advise by your doctor or midwife. Avoid lifting anything heavier than your baby or a gallon of milk for SIX weeks. Avoid driving 1 week for vaginal delivery and 2 weeks for  section, unless otherwise instructed by physician. Rise slowly from a lying to sitting and then a standing position. Climb stairs carefully. Use caution when carrying your baby up and down the stairs. NO SEXUAL Activity for 6 weeks or until advised by your doctor; Nothing in vagina: intercourse, tampons, or douching. Be prepared to discuss family planning at your follow-up OB visit. You may feel tired or have a lack of energy. You may continue your prenatal vitamin to replenish nutrients post delivery. Nap when baby naps to catch up on sleep. May shower, NO tub baths, swimming, or hot tubs. EMOTIONS  You may feel ball, sad, teary, & overwhelmed for the first 2 weeks postpartum. Contact your OB provider if you feel you may be showing signs of postpartum depression, or have thoughts of harming yourself or your infant. If infant will not stop crying, contact another adult for help or place infant in their crib on their back and take a break.   NEVER shake your infant. BLEEDING  Vaginal bleeding will decrease in amount over the next few weeks. You will notice that as your activity increases, your flow may increase. This is your body's way of telling you, you need take things easier and rest more often. Call your OB/ER if you are saturating one maxi pad in an hour & passing large clots. BREAST CARE  Take medications as recommended by your doctor or midwife for pain  If you develop a warm, red, tender area on your breast or develop a fever contact your lactation consultant. [phone]. For breastfeeding moms:  If you become engorged, feeding may be more difficult or painful for 1-2 days. You may find it helpful to hand express some milk so that the infant can latch on more easily. While breastfeeding, continue to take your prenatal vitamins as directed by your doctor or midwife. Refer to the breastfeeding booklet in the  folder/binder for more information. For any questions or concerns contact a Lactation Consultant. Leave a message and your call will be returned. LONDON CARE  Shower daily, perineum with mild soap. Use the london-bottle until bleeding stops each time you use the restroom. Cleanse your perineum from front to back. If used, stitches will dissolve in 4-6 weeks. You may use a sitz bath or soak in a clean tub with drain open and water running for comfort. Kegel exercises will help restore bladder control. SWELLING  Try to keep your legs elevated when you are sitting. When lying down keep your legs elevated. CALL THE DOCTOR WITH THESE HEALTH CONCERNS OR PROBLEMS  If you have a temp of 100.4or more. If your bleeding has increased and you are saturating a pad in an hour. Your abdomen is tender to touch. You are passing blood clots bigger than the size of a lemon. If you are experiencing extreme weakness or dizziness. If you are having flu-like symptoms such as achy muscles or joints.   There is a foul smell or a green color to your vaginal bleeding. If you have pain that cannot be relieved. You have persistent burning with urination or frequency. Call if you have concerns about your well-being. You are unable to sleep, eat, or are having thoughts of harming yourself or your baby. You have a red, warm, tender area in you calf. Learning About Preeclampsia After Childbirth  What is preeclampsia? Preeclampsia means that your blood pressure during pregnancy is higher than usual. You may also have other serious symptoms. Preeclampsia can be dangerous. When it is severe, it can cause seizures (eclampsia) or liver or kidney damage. When it affects the liver, it can cause HELLP syndrome, a blood-clotting and bleeding problem. HELLP can come on quickly and can be deadly. This is why your doctor checks you and your baby often. Preeclampsia usually occurs after 20 weeks of pregnancy. Most often, it starts near the end of pregnancy and goes away after childbirth. But symptoms may last a few weeks or more and can get worse after delivery. Rarely, symptoms of preeclampsia don't show up until days or even weeks after childbirth. What are the symptoms? Mild preeclampsia usually doesn't cause symptoms. But preeclampsia can cause rapid weight gain and sudden swelling of the hands and face. Severe preeclampsia does cause symptoms. It can cause a very bad headache and trouble seeing and breathing. It also can cause belly pain. You may also urinate less than usual.  What can you expect after you have had preeclampsia? In the hospital  After the baby and the placenta are delivered, preeclampsia usually starts to improve. Most women get better in the first few days after childbirth. After having preeclampsia, you still have a risk of seizures for a day or more after childbirth. (Very rarely, seizures happen later on.) So your doctor may have you take magnesium sulfate for a day or more to prevent seizures.  You may also take medicine to lower your blood pressure. When you go home  Your blood pressure will most likely return to normal a few days after delivery. Your doctor will want to check your blood pressure sometime in the first week after you leave the hospital.  Some women still have high blood pressure 6 weeks after childbirth. But most return to normal levels over the long term. Take and record your blood pressure at home if your doctor tells you to. Ask your doctor to check your blood pressure monitor to be sure that it is accurate and that the cuff fits you. Also ask your doctor to watch you use it, to make sure that you are using it right. You should not eat, use tobacco products, or use medicine known to raise blood pressure (such as some nasal decongestant sprays) before you take your blood pressure. Avoid taking your blood pressure if you have just exercised. Also avoid taking it if you are nervous or upset. Rest at least 15 minutes before you take your blood pressure. Be safe with medicines. If you take medicine, take it exactly as prescribed. Call your doctor if you think you are having a problem with your medicine. Do not smoke. Quitting smoking will help improve your baby's growth and health. If you need help quitting, talk to your doctor about stop-smoking programs and medicines. These can increase your chances of quitting for good. Eat a balanced and healthy diet that has lots of fruits and vegetables. Long-term health  After you have had preeclampsia, you have a higher-than-average risk of heart disease, stroke, and kidney disease. This may be because the same things that cause preeclampsia also cause heart and kidney disease. To protect your health, work with your doctor on living a heart-healthy lifestyle and getting the checkups you need. Your doctor may also want you to check your blood pressure at home. Follow-up care is a key part of your treatment and safety.  Be sure to make and go to all appointments, and call your doctor if you are having problems. It's also a good idea to know your test results and keep a list of the medicines you take. When should you call for help? Share this information with your partner or a friend. They can help you watch for warning signs. Call 911  anytime you think you may need emergency care. For example, call if:    You passed out (lost consciousness). You have a seizure. Call your doctor now or seek immediate medical care if:    You have symptoms of preeclampsia, such as:  Sudden swelling of your face, hands, or feet. New vision problems (such as dimness, blurring, or seeing spots). A severe headache. Your blood pressure is very high, such as 160/110 or higher. Your blood pressure is higher than your doctor told you it should be, or it rises quickly. You have new nausea or vomiting. You have pain in your belly or pelvis. Watch closely for changes in your health, and be sure to contact your doctor if:    You gain weight rapidly. Where can you learn more? Go to http://www.woods.com/ and enter Q718 to learn more about \"Learning About Preeclampsia After Childbirth. \"  Current as of: 2022               Content Version: 13.5   Confer. Care instructions adapted under license by [facility] ([facility]). If you have questions about a medical condition or this instruction, always ask your healthcare professional. [place] [address] any warranty or liability for your use of this information. After Your Delivery (the Postpartum Period): Care Instructions  Overview     Congratulations on the birth of your baby. Like pregnancy, the  period can be a time of excitement, srinivas, and exhaustion. You may look at your wondrous little baby and feel happy. You may also be overwhelmed by your new sleep hours and new responsibilities.   At first, babies often sleep during the days and are awake at night. They do not have a pattern or routine. They may make sudden gasps, jerk themselves awake, or look like they have crossed eyes. These are all normal, and they may even make you smile. In these first weeks after delivery, try to take good care of yourself. It may take 4 to 6 weeks to feel like yourself again, and possibly longer if you had a  birth. You will likely feel very tired for several weeks. Your days will be full of ups and downs, but lots of srinivas as well. Follow-up care is a key part of your treatment and safety. Be sure to make and go to all appointments, and call your doctor if you are having problems. It's also a good idea to know your test results and keep a list of the medicines you take. How can you care for yourself at home? Take care of your body after delivery  Use pads instead of tampons for the bloody flow that may last as long as 2 weeks. Ease cramps with ibuprofen (Advil, Motrin). Ease soreness of hemorrhoids and the area between your vagina and rectum with ice compresses or witch hazel pads. Ease constipation by drinking lots of fluid and eating high-fiber foods. Ask your doctor about over-the-counter stool softeners. Cleanse yourself with a gentle squeeze of warm water from a bottle instead of wiping with toilet paper. Take a sitz bath in warm water several times a day. Wear a good nursing bra. Ease sore and swollen breasts with warm, wet washcloths. If you aren't breastfeeding, use ice rather than heat for breast soreness. Your period may not start for several months if you are breastfeeding. You may bleed more, and longer at first, than you did before you got pregnant. Wait until you are healed (about 4 to 6 weeks) before you have sex. Ask your doctor when it is okay for you to have sex. Try not to travel with your baby for 5 or 6 weeks. If you take a long car trip, make frequent stops to walk around and stretch. Avoid exhaustion  Rest every day.  Try to nap when your baby naps. Ask another adult to be with you for a few days after delivery. Plan for  if you have other children. Stay flexible so you can eat at odd hours and sleep when you need to. Both you and your baby are making new schedules. Plan small trips to get out of the house. Change can make you feel less tired. Ask for help with housework, cooking, and shopping. Remind yourself that your job is to care for your baby. Know about help for postpartum depression  \"Baby blues\" are common for the first 1 to 2 weeks after birth. You may cry or feel sad or irritable for no reason. Rest whenever you can. Being tired makes it harder to handle your emotions. Go for walks with your baby. Talk to your partner, friends, and family about your feelings. If your symptoms last for more than a few weeks, or if you feel very depressed, ask your doctor for help. Postpartum depression can be treated. Support groups and counseling can help. Sometimes medicine can also help. Stay healthy  Eat healthy foods so you have more energy. If you breastfeed, avoid drugs. If you quit smoking during pregnancy, try to stay smoke-free. If you choose to have a drink now and then, have only one drink, and limit the number of occasions that you have a drink. Wait to breastfeed at least 2 hours after you have a drink to reduce the amount of alcohol the baby may get in the milk. Start daily exercise after 4 to 6 weeks, but rest when you feel tired. Learn exercises to tone your belly. Try Kegel exercises to regain strength in your pelvic muscles. You can do these exercises while you stand or sit. (If doing these exercises causes pain, stop doing them and talk with your doctor.)  Squeeze your muscles as if you were trying not to pass gas. Or squeeze your muscles as if you were stopping the flow of urine. Your belly, legs, and buttocks shouldn't move. Hold the squeeze for 3 seconds, then relax for 5 to 10 seconds.   Start with 3 seconds, then add 1 second each week until you are able to squeeze for 10 seconds. Repeat the exercise 10 times a session. Do 3 to 8 sessions a day. Find a class for you and your baby that has an exercise time. If you had a  birth, give yourself a bit more time before you exercise, and be careful. When should you call for help? Share this information with your partner, family, or a friend. They can help you watch for warning signs. Call 911  anytime you think you may need emergency care. For example, call if:    You have thoughts of harming yourself, your baby, or another person. You passed out (lost consciousness). You have chest pain, are short of breath, or cough up blood. You have a seizure. Call your doctor now or seek immediate medical care if:    You have signs of hemorrhage (too much bleeding), such as:  Heavy vaginal bleeding. This means that you are soaking through one or more pads in an hour. Or you pass blood clots bigger than an egg. Feeling dizzy or lightheaded, or you feel like you may faint. Feeling so tired or weak that you cannot do your usual activities. A fast or irregular heartbeat. New or worse belly pain. You have signs of infection, such as:  A fever. Vaginal discharge that smells bad. New or worse belly pain. You have symptoms of a blood clot in your leg (called a deep vein thrombosis), such as:  Pain in the calf, back of the knee, thigh, or groin. Redness and swelling in your leg or groin. You have signs of preeclampsia, such as:  Sudden swelling of your face, hands, or feet. New vision problems (such as dimness, blurring, or seeing spots). A severe headache. Watch closely for changes in your health, and be sure to contact your doctor if:    Your vaginal bleeding isn't decreasing. You feel sad, anxious, or hopeless for more than a few days. You are having problems with your breasts or breastfeeding.    Where can you learn more?  Go to http://www.woods.com/ and enter A461 to learn more about \"After Your Delivery (the Postpartum Period): Care Instructions. \"  Current as of: February 23, 2022               Content Version: 13.5  © 3405-8725 Healthwise, Incorporated. Care instructions adapted under license by [facility] ([facility]). If you have questions about a medical condition or this instruction, always ask your healthcare professional. [address] any warranty or liability for your use of this information. Depression After Childbirth: Care Instructions  It's common to lose sleep, feel irritable, and cry easily during the first few days after childbirth. Hormone changes and the demands of a new baby can cause these \"baby blues. \" If these mood changes last more than 2 weeks, you may have postpartum depression. This is a medical condition that requires treatment. If you have any of these signs, you may be depressed. See your doctor right away. You feel very sad or hopeless and lose interest in daily activities. You sleep too much or not enough. You feel tired or as if you have no energy. You eat too much or too little. You write or talk about death. Where to get help 24 hours a day, 7 days a week   If you or someone you know talks about suicide, self-harm, a mental health crisis, a substance use crisis, or any other kind of emotional distress, get help right away. You can:   Call the Suicide and Crisis Lifeline at 65. Call 4-238-858-[facility] (). 8-855.214.6248    Text HOME to 939028 to access the Crisis Text Line. Consider saving these numbers in your phone. What you can do   Try to go to all of your counseling sessions. Take medicines as directed. Eat healthy foods. Get daily exercise, such as walks. Try to get some sunlight every day. Avoid using alcohol or other substances. Get as much rest as possible.   Connect with friends, and join a support group for new parents. When should you call for help? Call 911 if: You feel you cannot stop from hurting yourself, your baby, or someone else. Call your doctor now or seek immediate medical care if:    You are having trouble caring for yourself or your baby. You hear voices. Contact your doctor if:    You have problems with your medicines. You do not get better as expected. Follow-up care is a key part of your treatment and safety. Be sure to make and go to all appointments, and call your doctor if you are having problems. It's also a good idea to know your test results and keep a list of the medicines you take. Where can you learn more? Go to http://www.woods.com/ and enter I013 to learn more about \"Depression After Childbirth: Care Instructions. \"  Current as of: February 9, 2022               Content Version: 13.5  © 2006-2022 [employer], Inc[employer]. Care instructions adapted under license by [facility] ([facility]). If you have questions about a medical condition or this instruction, always ask your healthcare professional. [address] any warranty or liability for your use of this information.

## 2022-12-30 NOTE — PROGRESS NOTES
OB/GYN Resident Interval Note    Patient's fetal heart tracing reviewed, 5 minute prolonged deceleration noted [id number] with amparo to 90bpm which resolved with position changes. Remainder of tracing category 1, contractions q3min. Continue to monitor closely.     Wilber Barrios, DO  OB/GYN Resident

## 2022-12-30 NOTE — FLOWSHEET NOTE
Patient admitted to room 744 from L&D via wheelchair. Oriented to room and surroundings. Plan of care reviewed. Verbalized understanding. Instructed on infant security and safe sleep practices. Preventing falls education provided . The following handouts given: A New Beginning: Your Guide to Postpartum Care, Rounding, gs Security System,Babies Cry A lot, Safe Sleep, Security and Visitation Guidelines. Call light placed within reach.

## 2022-12-30 NOTE — L&D DELIVERY NOTE
Mother's Information      Labor Events     Labor?: No  Cervical Ripening:   Now               Lulú Pineda Pending Belinda Shoemaker [[id number]]      Labor Events     Labor?: No   Steroids?: None  Cervical Ripening Date/Time:     Cervical Ripening Type: Conde/EASI, Misoprostol, Cervidil  Antibiotics Received during Labor?: No  Rupture Date/Time: 22 22:38:20   Rupture Type: AROM  Fluid Color: Clear  Fluid Odor: None  Fluid Volume: None  Induction: AROM, Cervidil, Conde Bulb (Balloon), Misoprostol  Augmentation: Oxytocin       Anesthesia    Method: Epidural       Start Pushing      Labor onset date/time: 22 16:36:00 Now     Dilation complete date/time: 22 20:22:00 Now     Start pushing date/time: 2022 20:38:18   Decision date/time (emergent ):           Delivery ([place])      Delivery Date/Time:  22 21:06:05       Details:             Presentation    _: Occiput  _: Posterior       Shoulder Dystocia    Add Second Maneuver  Add Third Maneuver  Add Fourth Maneuver  Add Fifth Maneuver  Add Sixth Maneuver  Add Seventh Maneuver  Add Eighth Maneuver  Add Ninth Maneuver       Assisted Delivery Details           Document Additional Attempt         Document Additional Attempt                 Cord    Delayed Cord Clamping?: Yes  Cord Clamped Date/Time: 2022 21:07:00  Gases Sent?: Yes       Placenta    Date/Time: 2022 21:16:22  Removal: Spontaneous  Appearance: Intact  Disposition: Pathology       Lacerations    Episiotomy: None  Perineal Lacerations: None  Other Lacerations: no non-perineal laceration       Vaginal Counts        Sponges Needles Instruments   Initial Counts Correct Correct Correct   Final Counts Correct Correct Correct   Accurate Final Count?: Yes  If the count is incorrect due to Intentionally Retained Foreign Object (IRFO) add the IRFO LDA in Lines/Drains.   Add LDA: Link to [facility]       Blood Loss  Mother: Rolf Foster #[id number] Start of Mother's Information      Delivery Blood Loss  22 1636 - 22 2122      None                 End of Mother's Information  Mother: Bart Parent #[id number]                Delivery Providers    Delivering clinician:      Provider Role     Obstetrician    Germaine Montes, RN Primary Nurse    Cristina Will, RN Primary  Nurse     NICU Nurse     Neonatologist     Anesthesiologist     Nurse Anesthetist     Nurse Practitioner     Midwife     Nursery Nurse    Julia Hinton MD Resident              [place] Assessment       Apgar Scoring Key:    0 1 2    Skin Color: Blue or pale Acrocyanotic Completely pink    Heart Rate: Absent <100 bpm >100 bpm    Reflex Irritability: No response Grimace Cry or active withdrawal    Muscle Tone: Limp Some flexion Active motion    Respiratory Effort: Absent Weak cry; hypoventilation Good, crying                      Skin Color:   Heart Rate:   Reflex Irritability:   Muscle Tone:   Respiratory Effort:    Total:            1 Minute:        Apgar 1 total from OB History    5 Minute:        Apgar 5 total from OB History    10 Minute:              15 Minute:              20 Minute:                                 Resuscitation                Measurements               Title      Skin to Skin Initiation Date/Time:       Skin to Skin End Date/Time:                    Vaginal Delivery Note  Department of Obstetrics and Gynecology  [facility]       Patient: Nona Jorge   : 2000  MRN: [id number]   Date of delivery: 22     Pre-operative Diagnosis: Nona Jorge [id number] at 37w6d  Induction of labor secondary to fetal growth restriction and chronic hypertension (no meds)  Partner with HSV lesions   Fetal microcephaly  BMI 34    Post-operative Diagnosis:  same as above and  living infant male, direct OP presentation    Delivering Obstetrician & Assistant(s): Dr. Vishal Rojas DO, PGY3; Julia Hinton MD    Infant Information:   Information for the patient's :  Fatou Rodriguez [[id number]]      Information for the patient's :  Fatou Rodriguez [[id number]]      Apgar scores: 8 at 1 minute and 9 at 5 minutes. Anesthesia:  epidural anesthesia    Application and Delivery:    She was known to be GBS negative. The patient progressed well, received an epidural, became complete and felt the urge to push. After pushing with contractions the fetal head delivered Cephalic, direct occiput posterior over an intact perineum, nuchal cord was not present. The anterior, then posterior shoulder delivered easily and atraumatically followed by the rest of the infant. Nose and mouth suctioned with bulb suction, infant was stimulated and dried. Cord was clamped and cut after one minute delayed cord clamping and infant was placed on mother's abdomen, and attended by RN for evaluation. The delivery of the placenta was spontaneous and appeared intact, whole, and that the umbilical cord had three vessels noted. Pitocin was started. The vagina was swept of all clots and debris. The perineum and vagina were evaluated and a right periurethral laceration was found and noted to be hemostatic and not repaired. Mother and baby tolerated procedure well. Dr. Trish Kingston was present for entire delivery.     Delivery Summary:     Specimen: placenta sent to pathology, cord blood, and cord gases  Quantitative blood loss:  50ml immediately following delivery  Condition:  infant stable to general nursery and mother stable  Counts: instrument and sponge counts correct  Blood Type and Rh: O POSITIVE    Rubella Immunity Status: immune    April MD Jamaal  Ob/Gyn Resident  2022, 9:23 PM

## 2022-12-30 NOTE — PROGRESS NOTES
Labor Progress Note    Whitney Diaz is a 23 y.o. female  at 41w10d  The patient was seen and examined. Her pain is well controlled. She reports fetal movement is present, complains of contractions, complains of loss of fluid, denies vaginal bleeding. Vital Signs:  Vitals:    22 1900 22 1930 22 1934 22 193   BP: (!) 140/76 103/83 103/83    Pulse: 68 62 62    Resp: 16 16 16    Temp: 98.2 °F (36.8 °C)      TempSrc: Oral      SpO2: 97% 97%  97%     FHT:  140 , moderate variability, accelerations present, decelerations prolonged from 130s to 100 bpm, rare variables/lates  Contractions: not tracing well    Chaperone for Intimate Exam: Chaperone was present for entire exam, Chaperone Name: Dami Razo RN  Cervical Exam: 7-8 cm dilated, 80 effaced, 0 station  Pitocin: @ 4 mu/min    Membranes: Ruptured clear fluid  Scalp Electrode in place: absent  Intrauterine Pressure Catheter in Place: present    Interventions: SVE and IUPC placement    Assessment/Plan:  Whitney Diaz is a 23 y.o. female  at 41w10d admitted for IOL 2/2 FGR and cHTN (no meds)   - GBS negative, No indication for GBS prophylaxis   - Afebrile   - cEFM/TOCO  - AROM (clr) @ 2238   - IUPC previously in place but was displaced.  Replaced on exam  - S/p Cytotec 25 mcg PO x2   - S/p Conde   - S/p Cervidil x1  - Epidural for pain control  - S/p Morphine/Compazine x1  - S/p Nubain x1  - Continue to monitor closely    Attending updated and in agreement with plan    Ortiffany Walker MD  Ob/Gyn Resident  2022, 8:01 PM

## 2022-12-30 NOTE — CARE COORDINATION
CASE MANAGEMENT POST-PARTUM TRANSITIONAL CARE PLAN    37 weeks gestation of pregnancy [Z3A.37]    OB Provider: [facility]    Writer met w/ Hodan and MAXI Diaz at bedside to discuss DCP. She is S/P  on 22 @ 37w6d of male infant    Writer verified name/address/phone number correct on facesheet. She states she lives with BF/FOB Wander. She verbalized no problems with transportation to and from doctors appointments or with paying for medications upon discharge home. [facility] insurance correct. Writer notified Lexa Rom she has 30 days from date of birth to add  to insurance policy by calling [employer]. She verbalized understanding. Hodan confirmed a safe place for infant to sleep at home. Infant name on BC: Kristine Diaz. Infant PCP [facility].      DME: none  HOME CARE: none    Anticipate DC of couplet 22    Readmission Risk              Risk of Unplanned Readmission:  7

## 2022-12-30 NOTE — PLAN OF CARE
Problem: Pain  Goal: Verbalizes/displays adequate comfort level or baseline comfort level  12/30/2022 1844 by Marcela Prajapati RN  Outcome: Progressing  12/30/2022 1843 by Marcela Prajapati RN  Outcome: Progressing  Flowsheets (Taken 12/30/2022 0800)  Verbalizes/displays adequate comfort level or baseline comfort level:   Encourage patient to monitor pain and request assistance   Assess pain using appropriate pain scale   Administer analgesics based on type and severity of pain and evaluate response   Implement non-pharmacological measures as appropriate and evaluate response   Consider cultural and social influences on pain and pain management   Notify Licensed Independent Practitioner if interventions unsuccessful or patient reports new pain

## 2022-12-30 NOTE — FLOWSHEET NOTE
Patient was coached on breathing/pushing with contractions. Legs were placed in stirrups. RN and resident remained in continuous attendance at the bedside while patient actively pushed. Assessment & evaluation of fetal heart rate was ongoing via continuous EFM until delivery of infant.

## 2022-12-30 NOTE — PLAN OF CARE
Problem: Pain  Goal: Verbalizes/displays adequate comfort level or baseline comfort level  2022 by Digna Hence, RN  Outcome: Progressing     Problem: Vaginal Birth or  Section  Goal: Fetal and maternal status remain reassuring during the birth process  Description:  Birth OB-Pregnancy care plan goal which identifies if the fetal and maternal status remain reassuring during the birth process  2022 by Digna Hence, RN  Outcome: Completed

## 2022-12-30 NOTE — CARE COORDINATION
Social Work     Sw reviewed medical record (current active problem list) and tox screens and found mom is + THC at time of delivery. Sw spoke with mom and fob (Dalila Pavon) briefly to explain Sw role, inquire if any needs or concerns, and provide safe sleep education and discuss. Mom denied any needs or questions and informs baby has a safe sleep environment (bassinet). Mom denied any current s/s of anxiety or depression and is aware to reach out to [facility]) if any s/s occur after dc. Mom reports a really good support system with both sides of family and denied any current questions or needs. Mom reports this is her 1st child. Mom states ped will be FCC. Sw discussed BARON mandate (mom provided verbal consent for fob to be present). Mom states she smoked due to Nausea. [facility] intake Sallie Farmer) informed. No other concerns, baby is cleared to dc home with mom. Sw encouraged mom to reach out if any issues or concerns arise.

## 2022-12-30 NOTE — FLOWSHEET NOTE
Patient admitted to room 744 from labor and delivery via wheelchair. Oriented to room and surroundings. Plan of care reviewed. Verbalized understanding. Instructed on infant security and safe sleep practices. Preventing falls education provided . The following handouts given: A New Beginning: Your Guide to Postpartum Care, Rounding, gs Security System,Babies Cry A lot, Safe Sleep, Security and Visitation Guidelines. Call light placed within reach.

## 2022-12-30 NOTE — ANESTHESIA POSTPROCEDURE EVALUATION
Department of Anesthesiology  Postprocedure Note    Patient: Alejandra Dorsey  MRN: [id number]  YOB: 2000  Date of evaluation: 12/30/2022      Procedure Summary     Date: 12/29/22 Room / Location:     Anesthesia Start: 0108 Anesthesia Stop: 2106    Procedure: Labor Analgesia Diagnosis:     Scheduled Providers:  Responsible Provider: Cece Sidhu MD    Anesthesia Type: epidural ASA Status: 2          Anesthesia Type: No value filed.     Marisol Phase I: Marisol Score: 10    Marisol Phase II: Marisol Score: 10      Anesthesia Post Evaluation    Patient location during evaluation: bedside  Patient participation: complete - patient participated  Level of consciousness: awake  Airway patency: patent  Nausea & Vomiting: no nausea and no vomiting  Complications: no  Cardiovascular status: blood pressure returned to baseline  Respiratory status: acceptable  Hydration status: euvolemic  Comments: /76   Pulse 77   Temp 98.6 °F (37 °C) (Oral)   Resp 16   LMP 04/08/2022 (Exact Date)   SpO2 99%   Breastfeeding Unknown

## 2022-12-31 VITALS
TEMPERATURE: 97.9 F | DIASTOLIC BLOOD PRESSURE: 82 MMHG | HEART RATE: 75 BPM | OXYGEN SATURATION: 99 % | SYSTOLIC BLOOD PRESSURE: 119 MMHG | RESPIRATION RATE: 16 BRPM

## 2022-12-31 PROCEDURE — 6370000000 HC RX 637 (ALT 250 FOR IP): Performed by: STUDENT IN AN ORGANIZED HEALTH CARE EDUCATION/TRAINING PROGRAM

## 2022-12-31 PROCEDURE — 86694 HERPES SIMPLEX NES ANTBDY: CPT

## 2022-12-31 PROCEDURE — 6360000002 HC RX W HCPCS: Performed by: STUDENT IN AN ORGANIZED HEALTH CARE EDUCATION/TRAINING PROGRAM

## 2022-12-31 PROCEDURE — 90471 IMMUNIZATION ADMIN: CPT | Performed by: STUDENT IN AN ORGANIZED HEALTH CARE EDUCATION/TRAINING PROGRAM

## 2022-12-31 PROCEDURE — 36415 COLL VENOUS BLD VENIPUNCTURE: CPT

## 2022-12-31 PROCEDURE — 86777 TOXOPLASMA ANTIBODY: CPT

## 2022-12-31 PROCEDURE — 99238 HOSP IP/OBS DSCHRG MGMT 30/<: CPT | Performed by: OBSTETRICS & GYNECOLOGY

## 2022-12-31 PROCEDURE — 86696 HERPES SIMPLEX TYPE 2 TEST: CPT

## 2022-12-31 PROCEDURE — 90715 TDAP VACCINE 7 YRS/> IM: CPT | Performed by: STUDENT IN AN ORGANIZED HEALTH CARE EDUCATION/TRAINING PROGRAM

## 2022-12-31 PROCEDURE — 86762 RUBELLA ANTIBODY: CPT

## 2022-12-31 PROCEDURE — 86644 CMV ANTIBODY: CPT

## 2022-12-31 PROCEDURE — 86695 HERPES SIMPLEX TYPE 1 TEST: CPT

## 2022-12-31 RX ORDER — LANOLIN ALCOHOL/MO/W.PET/CERES
325 CREAM (GRAM) TOPICAL 2 TIMES DAILY
Qty: 90 TABLET | Refills: 3 | Status: SHIPPED | OUTPATIENT
Start: 2022-12-31

## 2022-12-31 RX ADMIN — DOCUSATE SODIUM 100 MG: 100 CAPSULE ORAL at 11:02

## 2022-12-31 RX ADMIN — IBUPROFEN 600 MG: 600 TABLET, FILM COATED ORAL at 01:49

## 2022-12-31 RX ADMIN — TETANUS TOXOID, REDUCED DIPHTHERIA TOXOID AND ACELLULAR PERTUSSIS VACCINE, ADSORBED 0.5 ML: 5; 2.5; 8; 8; 2.5 SUSPENSION INTRAMUSCULAR at 11:01

## 2022-12-31 ASSESSMENT — PAIN SCALES - GENERAL: PAINLEVEL_OUTOF10: 5

## 2022-12-31 NOTE — PROGRESS NOTES
POST PARTUM DAY # 2    Pastor Hall is a 23 y.o. female  This patient was seen & examined today. Her pregnancy was complicated by:   Patient Active Problem List   Diagnosis    History of recurrent miscarriages (x3), currently pregnant    Chlamydia (TOR neg)    Exposure to genital herpes (current partner)    High-risk pregnancy    Obesity (BMI 30.17) affecting pregnancy in first trimester    COVID-19 vaccine series not completed    cHTN (no meds)    COVID-19    Microcephaly of fetus    FGR (AC <3rd%)    37 weeks gestation of pregnancy     22 M Apg 8/9 Wt 5#10       Today she is doing well without any chief complaint. Her lochia is light. She denies chest pain, shortness of breath, headache, and lightheadedness. She is bottle feeding and she denies any breast tenderness. She is ambulating well. Her voiding pattern is normal. I reviewed signs and symptoms of post partum depression with the patient, she currently denies any of these symptoms. She is tolerating solids.      Vital Signs:  Vitals:    22 1615 22 2000 22 0442 22 0832   BP: 130/80 (!) 103/58 110/64 119/82   Pulse: 80 95 82 75   Resp: 16 16 16 16   Temp: 98 °F (36.7 °C) 98.1 °F (36.7 °C) 97.9 °F (36.6 °C) 97.9 °F (36.6 °C)   TempSrc: Oral Oral Oral Oral   SpO2: 98% 99% 98% 99%         Physical Exam:  General:  no apparent distress, alert, and cooperative  Neurologic:  alert, oriented, normal speech, no focal findings or movement disorder noted  Lungs:  No increased work of breathing, good air exchange, clear to auscultation bilaterally, no crackles or wheezing  Heart:  regular rate and rhythm    Abdomen: abdomen soft, non-distended, non-tender  Fundus: non-tender, normal size, firm, below umbilicus  Extremities:  no calf tenderness, non edematous    Lab:  Lab Results   Component Value Date    HGB 9.9 (L) 2022     Lab Results   Component Value Date    HCT 31.5 (L) 2022       Assessment/Plan:  Pastor Hall is a [id number] PPD # 2 s/p    - Doing well, VSS   - male infant in [address]   - No circumcision 2/2 to [place]   - Encourage ambulation   - Postpartum Hgb/Hct if sx  Rh positive/Rubella immune  Bottle feeding   cHTN (no meds)  - BP normotensive  - PreE labs wnl, P/C 0.13  - Denies s/s of PreE  THC Use   - UDS +   - Baby cleared for d/c with mom  Continue post partum care  BMI 34    Counseling Completed:  Secondary Smoke risks and Sudden Infant Death Syndrome were reviewed with recommendations. Infant sleeping, \"back to sleep\" and avoidance of co-sleeping recommendations were reviewed. Signs and Symptoms of Post Partum Depression were reviewed. The patient is to call if any occur. Signs and symptoms of Mastitis were reviewed. The patient is to call if any occur for follow up. Discharge instructions including pelvic rest, no driving with pain medicine and office follow-up were reviewed with patient     Attending Physician: Dr. Jase Russ MD  Ob/Gyn Resident   2022, 5:45 AM    Date: 2022  Time: 9:42 AM      Patient Name: Dalton Parr  Patient : 2000  Room/Bed: 8584/3647-15  Admission Date/Time: 2022  9:00 PM        Attending Physician Statement  I have personally seen, evaluated and discussed the care of Dalton Parr, including pertinent history and exam findings with the resident. I have reviewed and edited their note in the electronic medical record. The key elements of all parts of the encounter have been performed/reviewed by me. I agree with the assessment, plan and orders as documented by the resident. The level of care submitted represents to the best of my ability the care documented in the medical record today. GC Modifier. This service has been performed in part by a resident under the direction of a teaching physician. Attending's Name:  Noelle Anguiano MD      Patient doing well. She has no concerns or complaints. Continue routine post-partum care. Stable for discharge.

## 2022-12-31 NOTE — FLOWSHEET NOTE
Pt discharged to private residence via ambulatory in stable condition with belongings  Discharge instructions given  \"Meds To Beds\" medication at bedside  Pt denies having any further questions at this time  personal items given to patient at discharge  Patient/family state they have everything they were admitted with.

## 2023-01-02 NOTE — PROGRESS NOTES
CLINICAL PHARMACY NOTE: MEDS TO BEDS    Total # of Prescriptions Filled: 3   The following medications were delivered to the patient:  Ibuprofen  Iron  senna    Additional Documentation:   Delivered to room by [facility]

## 2023-01-03 LAB
HERPES TYPE 1/2 IGM COMBINED: 0.96
SURGICAL PATHOLOGY REPORT: NORMAL

## 2023-01-05 LAB
CYTOMEGALOVIRUS IGG ANTIBODY: 10.1
HERPES SIMPLEX VIRUS 1 IGG: 3.56
HERPES SIMPLEX VIRUS 2 IGG: 0.46
RUBV IGG SER QL: 105.3 IU/ML
TOXOPLASMA BLOOD FOR RATIO: 0.6 IU/ML

## 2023-01-09 ENCOUNTER — TELEPHONE (OUTPATIENT)
Dept: OBGYN | Age: 23
End: 2023-01-09

## 2023-01-09 NOTE — TELEPHONE ENCOUNTER
Patient no showed for 1/9/23 appointment at [address] [address] office. Writer left a voice message for patient to call the office to reschedule appointment.

## 2023-01-16 ENCOUNTER — POSTPARTUM VISIT (OUTPATIENT)
Dept: OBGYN | Age: 23
End: 2023-01-16

## 2023-01-16 VITALS
HEART RATE: 82 BPM | BODY MASS INDEX: 32.56 KG/M2 | DIASTOLIC BLOOD PRESSURE: 97 MMHG | WEIGHT: 178 LBS | SYSTOLIC BLOOD PRESSURE: 135 MMHG

## 2023-01-16 PROBLEM — O99.211 OBESITY AFFECTING PREGNANCY IN FIRST TRIMESTER: Status: RESOLVED | Noted: 2022-06-16 | Resolved: 2023-01-16

## 2023-01-16 PROBLEM — O09.90 HIGH-RISK PREGNANCY: Status: RESOLVED | Noted: 2022-06-16 | Resolved: 2023-01-16

## 2023-01-16 PROBLEM — O35.07X0 MICROCEPHALY OF FETUS: Status: RESOLVED | Noted: 2022-11-09 | Resolved: 2023-01-16

## 2023-01-16 PROBLEM — Z3A.37 37 WEEKS GESTATION OF PREGNANCY: Status: RESOLVED | Noted: 2022-12-27 | Resolved: 2023-01-16

## 2023-01-16 PROCEDURE — 99024 POSTOP FOLLOW-UP VISIT: CPT | Performed by: STUDENT IN AN ORGANIZED HEALTH CARE EDUCATION/TRAINING PROGRAM

## 2023-01-16 RX ORDER — BLOOD PRESSURE TEST KIT
1 KIT MISCELLANEOUS DAILY
Qty: 1 KIT | Refills: 0 | Status: SHIPPED | OUTPATIENT
Start: 2023-01-16

## 2023-01-16 NOTE — PROGRESS NOTES
Postpartum Visit    Shubham Guthrie is a 23 y.o. female [id number], 2 weeks Post Partum s/p  spontaneous vaginal delivery on 22    The patient was seen. She has no chief complaint today. Her pregnancy was complicated by cHTN (no meds), FGR, Chlamydia (TOR negative). She is  bottle feeding and denies signs or symptoms of mastitis. The patient completed the E.P.D.S. Post Partum Depression Evaluation form and EPDS Score of 0. She does not have signs or symptoms of post partum depression. She denies any suicidal thoughts with a plan, intent to harm others, and delusional ideas. She does admit to having good home support. Today her lochia is light she denies any dizziness or shortness of breath. Her bowels are regular and she denies any urinary tract symptomology. She is using abstinence for STD prevention. Her pregnancy was complicated by:  Patient Active Problem List    Diagnosis Date Noted     22 M Apg 8/ Wt 5#10 2022     Priority: Medium    COVID-19 2022     Priority: Medium    History of recurrent miscarriages (x3), currently pregnant 2022     Priority: Medium     Overview Note:     2020: SAB  : SAB  : SAB      Exposure to genital herpes (current partner) 2022     Priority: Medium    FGR (AC <3rd%) 2022     Overview Note:     [facility]  noted   [facility]  **      cHTN (no meds) 2022     Overview Note:     /91 on   139/93 on       Chlamydia (TOR neg) 2022     Overview Note:     3/2022: Treated with Azithromycin  TOR- see labs      COVID-19 vaccine series not completed 2022     Overview Note:     Pt counseled on the the risks of not getting vaccinated in pregnancy against COVID-19. Pregnant women with symptomatic covid have a 70% increased risk of death. Covid-19 during pregnancy  increases the risk for adverse outcomes, including  birth and admission of the baby to an intensive care unit.             Vitals: Blood pressure (!) 135/97, pulse 82, weight 178 lb (80.7 kg), last menstrual period 2022, not currently breastfeeding. Physical Exam:  General:  no apparent distress, alert, and cooperative  Lungs:  No increased work of breathing, good air exchange, clear to auscultation bilaterally, no crackles or wheezing  Heart:  normal S1 and S2, regular rate and rhythm  Abdomen: Abdomen soft, non-tender, no rebound, guarding, rigidity  Fundus: non-tender, normal size, firm, below umbilicus  Perineum: not inspected  Extremities:  no calf tenderness, non edematous, non erythematous     Assessment:  Ronaldo Arrington is a 23 y.o. female [id number], 2 weeks Post Partum s/p spontaneous vaginal delivery on 22   - Doing well, continue routine post partum care   - Physical exam within normal limits   - Postpartum restrictions reviewed until 6 wks PP   - EPDS: 0   - Family planning: abstinence, condoms   - Influenza vaccination: R/B/A of Influenza vaccination discussed and pt declines. - COVID-19 vaccination: R/B/A discussed with increased risk of both maternal and fetal morbidity and mortality in unvaccinated pregnant patients who contract COVID-19- patient declined today   - Gardisil vaccination: none completed; patient declines today   - Last pap smear: N/A 2/2 age   - Indications for 2hr 75g GTT: none    cHTN (no meds)   - BP elevated nonsevere today, x2   - Denies s/s PreE   - Rx for BP cuff sent to patient pharmacy.  Instructed patient to check/log her BP at home daily and to report any /110 or greater   - S/s PreE reviewed with patient and reasons to call office   - Return in 1 week for BP check    Patient Active Problem List    Diagnosis Date Noted     22 M Apg 8/9 Wt 5#10 2022     Priority: Medium    COVID-19 2022     Priority: Medium    History of recurrent miscarriages (x3), currently pregnant 2022     Priority: Medium     2020: SAB  : SAB  : SAB      Exposure to genital herpes (current partner) 2022     Priority: Medium    FGR (AC <3rd%) 2022     MFM US  noted   MFM US  **      cHTN (no meds) 2022     /91 on   139/93 on       Chlamydia (TOR neg) 2022     3/2022: Treated with Azithromycin  TOR- see labs      COVID-19 vaccine series not completed 2022     Pt counseled on the the risks of not getting vaccinated in pregnancy against COVID-19. Pregnant women with symptomatic covid have a 70% increased risk of death. Covid-19 during pregnancy  increases the risk for adverse outcomes, including  birth and admission of the baby to an intensive care unit. Return in about 1 week (around 2023) for BP check. Counseling Completed:  Signs & Symptoms of mastitis and when to notify office discussed. Secondary smoke risks including increased risks of respiratory problems, Sudden infant death syndrome, and potential malignancies discussed  Abstinence, family planning counseling and STD counseling discussed  No heavy lifting or [place] until 6 weeks post partum    Sonido Thibodeaux DO  Ob/Gyn Resident  [facility], [place]  2023, 2:38 PM        Attending Physician Statement  I have discussed the care of [facility], including pertinent history and exam findings,  with the resident. I have reviewed the key elements of all parts of the encounter with the resident. I agree with the assessment, plan and orders as documented by the resident.   (GE Modifier)    Khushbu Dinh DO

## 2023-02-06 ENCOUNTER — POSTPARTUM VISIT (OUTPATIENT)
Dept: OBGYN | Age: 23
End: 2023-02-06
Payer: COMMERCIAL

## 2023-02-06 VITALS
DIASTOLIC BLOOD PRESSURE: 88 MMHG | BODY MASS INDEX: 32.56 KG/M2 | HEART RATE: 105 BPM | WEIGHT: 178 LBS | SYSTOLIC BLOOD PRESSURE: 134 MMHG

## 2023-02-06 DIAGNOSIS — Z20.2 EXPOSURE TO GENITAL HERPES: ICD-10-CM

## 2023-02-06 PROCEDURE — G8427 DOCREV CUR MEDS BY ELIG CLIN: HCPCS | Performed by: STUDENT IN AN ORGANIZED HEALTH CARE EDUCATION/TRAINING PROGRAM

## 2023-02-06 PROCEDURE — 1036F TOBACCO NON-USER: CPT | Performed by: STUDENT IN AN ORGANIZED HEALTH CARE EDUCATION/TRAINING PROGRAM

## 2023-02-06 PROCEDURE — 99211 OFF/OP EST MAY X REQ PHY/QHP: CPT | Performed by: STUDENT IN AN ORGANIZED HEALTH CARE EDUCATION/TRAINING PROGRAM

## 2023-02-06 PROCEDURE — G8417 CALC BMI ABV UP PARAM F/U: HCPCS | Performed by: STUDENT IN AN ORGANIZED HEALTH CARE EDUCATION/TRAINING PROGRAM

## 2023-02-06 PROCEDURE — G8484 FLU IMMUNIZE NO ADMIN: HCPCS | Performed by: STUDENT IN AN ORGANIZED HEALTH CARE EDUCATION/TRAINING PROGRAM

## 2023-02-06 RX ORDER — MEDROXYPROGESTERONE ACETATE 150 MG/ML
150 INJECTION, SUSPENSION INTRAMUSCULAR ONCE
Qty: 1 ML | Refills: 3 | Status: SHIPPED | OUTPATIENT
Start: 2023-02-06 | End: 2023-02-06

## 2023-02-06 RX ORDER — MEDROXYPROGESTERONE ACETATE 150 MG/ML
150 INJECTION, SUSPENSION INTRAMUSCULAR ONCE
Status: SHIPPED | OUTPATIENT
Start: 2023-02-06

## 2023-02-06 NOTE — PROGRESS NOTES
Postpartum Visit    Liborio Pinedo is a 23 y.o. female [id number], 6 weeks Post Partum s/p  spontaneous vaginal delivery on 22    The patient was seen. She has no chief complaint today. Her pregnancy was complicated by chronic hypertension on no medications,fetal growth restriction, and chlamydia (TOR neg). She is bottle feeding and denies signs or symptoms of mastitis. The patient completed the E.P.D.S. Post Partum Depression Evaluation form and EPDS Score of 0. She does not have signs or symptoms of post partum depression. She denies any suicidal thoughts with a plan, intent to harm others, and delusional ideas. She does admit to having good home support. She reports resumption of menstrual periods, FDLMP was 23. she denies any dizziness or shortness of breath. Her bowels are regular and she denies any urinary tract symptomology. She is using abstinence for STD prevention. Her pregnancy was complicated by:  Patient Active Problem List    Diagnosis Date Noted     24 M Apg 8 Wt 5#10 2022     Priority: Medium    COVID-19 2022     Priority: Medium    History of recurrent miscarriages (x3), currently pregnant 2022     Priority: Medium     Overview Note:     2020: SAB  : SAB  : SAB      Exposure to genital herpes (current partner) 2022     Priority: Medium    FGR (AC <3rd%) 2022     Overview Note:     [facility]  noted   [facility]  **      cHTN (no meds) 2022     Overview Note:     /91 on   139/93 on       Chlamydia (TOR neg) 2022     Overview Note:     3/2022: Treated with Azithromycin  TOR- see labs      COVID-19 vaccine series not completed 2022     Overview Note:     Pt counseled on the the risks of not getting vaccinated in pregnancy against COVID-19. Pregnant women with symptomatic covid have a 70% increased risk of death.   Covid-19 during pregnancy  increases the risk for adverse outcomes, including  birth and admission of the baby to an intensive care unit. Vitals:   Blood pressure 134/88, pulse (!) 105, weight 178 lb (80.7 kg), last menstrual period 2022, not currently breastfeeding. Physical Exam:    General:  no apparent distress, alert, and cooperative  Lungs:  No increased work of breathing, good air exchange  Heart:  normal S1 and S2, regular rate and rhythm  Abdomen: Abdomen soft, non-tender, no rebound, guarding, rigidity  Fundus: non-tender, normal size, firm, below umbilicus  Perineum: not inspected  Incision: N/A  Extremities:  no calf tenderness, non edematous, non erythematous     Assessment:  Breanne Goff is a 23 y.o. female [id number], 6 weeks Post Partum s/p spontaneous vaginal delivery on 22   - Doing well, continue routine post partum care   - Postpartum restrictions lifted today   - Physical exam within normal limits   - EPDS: 0   - Family planning: abstinence   - Patient desires Depo Provera for history of dysmenorrhea; no UPT necessary as patient started menstrual cycle two days ago and patient reports abstinence since her delivery   - Influenza vaccination: R/B/A of Influenza vaccination discussed and pt declines.    - COVID-19 vaccination: R/B/A discussed with increased risk of both maternal and fetal morbidity and mortality in unvaccinated pregnant patients who contract COVID-19- patient declined today   - Gardisil vaccination: patient declines   - Last pap smear: N/A 2/2 age   - Indications for 2hr 75g GTT: none   - To return to office in 3 months for next Depo Provera and in August for annual exam/pap smear    cHTN (no meds)   - BP normotensive today   - Denies s/s PreE   - S/s PreE and reasons to call office reviewed with patient    Patient Active Problem List    Diagnosis Date Noted     24 M Apg 8/9 Wt 5#10 2022     Priority: Medium    COVID-19 2022     Priority: Medium    History of recurrent miscarriages (x3), currently pregnant 2022     Priority: Medium     2020: SAB  : SAB  : SAB      Exposure to genital herpes (current partner) 2022     Priority: Medium    FGR (AC <3rd%) 2022     [facility] US  noted   [facility]  **      cHTN (no meds) 2022     /91 on   139/93 on       Chlamydia (TOR neg) 2022     3/2022: Treated with Azithromycin  TOR- see labs      COVID-19 vaccine series not completed 2022     Pt counseled on the the risks of not getting vaccinated in pregnancy against COVID-19. Pregnant women with symptomatic covid have a 70% increased risk of death. Covid-19 during pregnancy  increases the risk for adverse outcomes, including  birth and admission of the baby to an intensive care unit. Return in about 3 months (around 2023) for Depo injection. Counseling Completed:  Signs & Symptoms of mastitis and when to notify office discussed. Secondary smoke risks including increased risks of respiratory problems, Sudden infant death syndrome, and potential malignancies discussed  Abstinence, family planning counseling and STD counseling discussed      Elias Ames DO  Ob/Gyn Resident  [facility], [facility]  2023, 2:32 PM        Attending Physician Statement  I have discussed the care of [facility], including pertinent history and exam findings,  with the resident. I have reviewed the key elements of all parts of the encounter with the resident. I agree with the assessment, plan and orders as documented by the resident.   (GE Modifier)    Mateus Santoro DO

## 2023-02-06 NOTE — PROGRESS NOTES
Patient presents to office for Depo Provera injection. Per doctor's orders of Depo Provera 150mg given IM, {Blank single:61047::\"Right Gluteal \",\"Left Gluteal\",\"Right Deltoid\",\"Left Deltoid\"}, patient tolerated well. Patient advised to return in 11-12 weeks for next injection.     NDC# ***  LOT# ***  Exp date- ***

## 2023-02-14 ENCOUNTER — HOSPITAL ENCOUNTER (EMERGENCY)
Age: 23
Discharge: HOME | End: 2023-02-14
Attending: EMERGENCY MEDICINE
Payer: COMMERCIAL

## 2023-02-14 VITALS
BODY MASS INDEX: 32.92 KG/M2 | WEIGHT: 180 LBS | DIASTOLIC BLOOD PRESSURE: 92 MMHG | OXYGEN SATURATION: 99 % | SYSTOLIC BLOOD PRESSURE: 125 MMHG | HEART RATE: 89 BPM | RESPIRATION RATE: 16 BRPM | TEMPERATURE: 98.4 F

## 2023-02-14 DIAGNOSIS — K04.7 DENTAL INFECTION: Primary | ICD-10-CM

## 2023-02-14 PROCEDURE — 99283 EMERGENCY DEPT VISIT LOW MDM: CPT

## 2023-02-14 PROCEDURE — 6370000000 HC RX 637 (ALT 250 FOR IP): Performed by: STUDENT IN AN ORGANIZED HEALTH CARE EDUCATION/TRAINING PROGRAM

## 2023-02-14 RX ORDER — PENICILLIN V POTASSIUM 500 MG/1
500 TABLET ORAL 4 TIMES DAILY
Qty: 28 TABLET | Refills: 0 | Status: SHIPPED | OUTPATIENT
Start: 2023-02-14 | End: 2023-02-21

## 2023-02-14 RX ORDER — IBUPROFEN 800 MG/1
800 TABLET ORAL EVERY 8 HOURS PRN
Qty: 28 TABLET | Refills: 0 | Status: SHIPPED | OUTPATIENT
Start: 2023-02-14 | End: 2023-02-28

## 2023-02-14 RX ORDER — IBUPROFEN 800 MG/1
800 TABLET ORAL ONCE
Status: COMPLETED | OUTPATIENT
Start: 2023-02-14 | End: 2023-02-14

## 2023-02-14 RX ORDER — ACETAMINOPHEN 500 MG
1000 TABLET ORAL ONCE
Status: COMPLETED | OUTPATIENT
Start: 2023-02-14 | End: 2023-02-14

## 2023-02-14 RX ORDER — ACETAMINOPHEN 500 MG
1000 TABLET ORAL EVERY 6 HOURS PRN
Qty: 112 TABLET | Refills: 0 | Status: SHIPPED | OUTPATIENT
Start: 2023-02-14 | End: 2023-02-28

## 2023-02-14 RX ORDER — PENICILLIN V POTASSIUM 250 MG/1
500 TABLET ORAL ONCE
Status: COMPLETED | OUTPATIENT
Start: 2023-02-14 | End: 2023-02-14

## 2023-02-14 RX ADMIN — ACETAMINOPHEN 1000 MG: 500 TABLET ORAL at 09:26

## 2023-02-14 RX ADMIN — PENICILLIN V POTASSIUM 500 MG: 250 TABLET ORAL at 08:55

## 2023-02-14 RX ADMIN — IBUPROFEN 800 MG: 800 TABLET, FILM COATED ORAL at 09:26

## 2023-02-14 ASSESSMENT — ENCOUNTER SYMPTOMS
CONSTIPATION: 0
FACIAL SWELLING: 1
COUGH: 0
SHORTNESS OF BREATH: 0
NAUSEA: 0
SORE THROAT: 1
BACK PAIN: 0
ABDOMINAL PAIN: 0
VOMITING: 0
DIARRHEA: 0

## 2023-02-14 NOTE — ED PROVIDER NOTES
[facility] ED  Emergency Department Encounter  EmergencyMedicine Resident     Pt Name:Hodan Roberts  MRN: [id number]  [place] 2000  Date of evaluation: 2/14/23  PCP:  JAM Villela CNP    This patient was evaluated in the Emergency Department for symptoms described in the history of present illness. CHIEF COMPLAINT       Chief Complaint   Patient presents with    Dental Pain     Left lower        HISTORY OF PRESENT ILLNESS  (Location/Symptom, Timing/Onset, Context/Setting, Quality, Duration, Modifying Factors, Severity.)      Dalton Parr is a 23 y.o. female who presents with mouth pain. Patient states that this has been occurring for 2 to 3 days. Endorses left-sided mouth pain and swelling. No foul taste. No fevers or chills. No difficulty swallowing. No change in voice. Some sore throat. No chest pain or shortness of breath. Tolerating p.o. Has not been taking anything for the pain. Does not have a dentist.  No recent dental work. PAST MEDICAL / SURGICAL / SOCIAL / FAMILY HISTORY      has a past medical history of BV (bacterial vaginosis), Chlamydia, Chlamydia infection, Chlamydia infection affecting pregnancy, cHTN (no meds), Closed fracture of phalanx of middle finger, Gonorrhea, History of blood transfusion, Seasonal allergies, Strep throat, Trichomonas infection, and Trichomonas vaginitis. has no past surgical history on file.     Social History     Socioeconomic History    Marital status: Single     Spouse name: Not on file    Number of children: Not on file    Years of education: Not on file    Highest education level: Not on file   Occupational History    Not on file   Tobacco Use    Smoking status: Never     Passive exposure: Yes    Smokeless tobacco: Never   Vaping Use    Vaping Use: Never used    Passive vaping exposure: Yes   Substance and Sexual Activity    Alcohol use: Not Currently     Comment: Not during pregnancy (last drink summer 2021) Drug use: Never    Sexual activity: Yes     Partners: Male   Other Topics Concern    Not on file   Social History Narrative    Not on file     Social Determinants of Health     Financial Resource Strain: Medium Risk    Difficulty of Paying Living Expenses: Somewhat hard   Food Insecurity: Food Insecurity Present    Worried About Running Out of Food in the Last Year: Sometimes true    Ran Out of Food in the Last Year: Sometimes true   Transportation Needs: Not on file   Physical Activity: Not on file   Stress: Not on file   Social Connections: Not on file   Intimate Partner Violence: Not on file   Housing Stability: Not on file       Family History   Problem Relation Age of Onset    Pancreatic Cancer Father     Arthritis Maternal Grandmother     Heart Attack Maternal Grandmother     Stroke Maternal Grandfather     Diabetes Maternal Grandfather     No Known Problems Paternal Grandfather     No Known Problems Paternal Grandmother     No Known Problems Mother     No Known Problems Brother     No Known Problems Sister        Allergies:    Patient has no known allergies. Home Medications:  Prior to Admission medications    Medication Sig Start Date End Date Taking?  Authorizing Provider   acetaminophen (TYLENOL) 500 MG tablet Take 2 tablets by mouth every 6 hours as needed for Pain 2/14/23 2/28/23 Yes Abhishek Mckenna DO   ibuprofen (ADVIL;MOTRIN) 800 MG tablet Take 1 tablet by mouth every 8 hours as needed for Pain 2/14/23 2/28/23 Yes Abhishek Joinerellone, DO   penicillin v potassium (VEETID) 500 MG tablet Take 1 tablet by mouth 4 times daily for 7 days 2/14/23 2/21/23 Yes Abhishek Mckenna DO   medroxyPROGESTERone (DEPO-PROVERA) 150 MG/ML injection Inject 1 mL into the muscle once for 1 dose 2/6/23 2/6/23  Jamshid Gupta DO   Blood Pressure KIT 1 kit by Does not apply route daily  Patient not taking: Reported on 2/6/2023 1/16/23   Jamshid Gupta DO   ferrous sulfate (FE TABS) 325 (65 Fe) MG EC tablet Take 1 tablet by mouth 2 times daily 12/31/22   Silke Brewer DO   senna-docusate (SENOKOT S) 8.6-50 MG per tablet Take 1 tablet by mouth daily for 60 doses  Patient not taking: No sig reported 12/29/22 2/27/23  Aliyah Junior DO   sertraline (ZOLOFT) 25 MG tablet Take 1 tablet by mouth daily  Patient not taking: No sig reported 12/16/22   Ave Rebolledo DO   potassium chloride (KLOR-CON M) 10 MEQ extended release tablet Take 1 tablet by mouth 2 times daily for 4 days 12/16/22 12/20/22  Ave Rebolledo DO   ondansetron (ZOFRAN) 4 MG tablet Take 1 tablet by mouth daily as needed for Nausea or Vomiting  Patient not taking: No sig reported 7/28/22   Neris Medina DO       REVIEW OF SYSTEMS    (2-9 systems for level 4, 10 or more for level 5)    Review of Systems   Constitutional:  Negative for chills and fever. HENT:  Positive for dental problem, facial swelling and sore throat. Negative for congestion. Eyes:  Negative for visual disturbance. Respiratory:  Negative for cough and shortness of breath. Cardiovascular:  Negative for chest pain. Gastrointestinal:  Negative for abdominal pain, constipation, diarrhea, nausea and vomiting. Genitourinary:  Negative for difficulty urinating, dysuria, vaginal bleeding and vaginal discharge. Musculoskeletal:  Negative for back pain. Skin:  Negative for wound. Neurological:  Negative for weakness, numbness and headaches. PHYSICAL EXAM   (up to 7 for level 4, 8 or more for level 5)    INITIAL VITALS:   BP (!) 125/92   Pulse 89   Temp 98.4 °F (36.9 °C)   Resp 16   Wt 180 lb (81.6 kg)   LMP 02/01/2023   SpO2 99%   BMI 32.92 kg/m²   I have reviewed the triage vital signs. Const: Well nourished, well developed, appears stated age, no acute distress, nontoxic  Eyes: PERRL, no conjunctival injection  HENT: NCAT, Neck supple without meningismus. Left anterior facial swelling. Swelling to left posterior buccal mucosa. Scant purulent discharge from this area.   No trismus. No tongue raising. Floor of mouth is soft. Posterior oropharynx is nonerythematous. No abscess formation. CV: RRR, Warm, well-perfused extremities  RESP: CTAB, Unlabored respiratory effort  GI: soft, non-tender, non-distended, no masses  MSK: No gross deformities appreciated  Skin: Warm, dry. No rashes  Neuro: Alert, CNs II-XII grossly intact. Sensation and motor function of extremities grossly intact. Psych: Appropriate mood and affect. DIFFERENTIAL  DIAGNOSIS   DDX:  Odontogenic infection    Initial MDM:  45-year-old with mouth pain, facial swelling. Edema with some purulent discharge from left buccal mucosa. No trismus. No tongue raising. Mouth floor is soft. No abscess formation in posterior oropharynx. No shortness of breath, difficulty breathing, difficulty swallowing, voice change. Tolerating p.o. Afebrile. Vital signs stable. Will treat with antibiotics. Will have patient follow-up with dentist.    PLAN (LABS / IMAGING / EKG):  No orders of the defined types were placed in this encounter. MEDICATIONS ORDERED:  Orders Placed This Encounter   Medications    penicillin v potassium (VEETID) tablet 500 mg     Order Specific Question:   Antimicrobial Indications     Answer:    Other     Order Specific Question:   Other Abx Indication     Answer:   dental infection    ibuprofen (ADVIL;MOTRIN) tablet 800 mg    acetaminophen (TYLENOL) tablet 1,000 mg    acetaminophen (TYLENOL) 500 MG tablet     Sig: Take 2 tablets by mouth every 6 hours as needed for Pain     Dispense:  112 tablet     Refill:  0    ibuprofen (ADVIL;MOTRIN) 800 MG tablet     Sig: Take 1 tablet by mouth every 8 hours as needed for Pain     Dispense:  28 tablet     Refill:  0    penicillin v potassium (VEETID) 500 MG tablet     Sig: Take 1 tablet by mouth 4 times daily for 7 days     Dispense:  28 tablet     Refill:  0           DIAGNOSTIC RESULTS / EMERGENCY DEPARTMENT COURSE / MDM   MDM/EMERGENCY DEPARTMENT COURSE:  Patient given Pen VK in the emergency department. Given prescription for Pen VK. Encouraged patient to follow-up with dentist as soon as possible. Given return precautions. Encourage patient to follow-up with PCP soon as possible. Patient discharged home. Patient understands and agrees with the plan. CRITICAL CARE:  Please see Attending Note    FINAL IMPRESSION      1.  Dental infection          DISPOSITION / PLAN     DISPOSITION Decision To Discharge 02/14/2023 09:25:48 AM    PATIENT REFERRED TO:  JAM Garcia - CNP  [facility] [address] [place] [address]  [phone]    Schedule an appointment as soon as possible for a visit   As needed    [facility]  [address]  Go to   If symptoms worsen    DISCHARGE MEDICATIONS:  Discharge Medication List as of 2/14/2023  9:28 AM        START taking these medications    Details   acetaminophen (TYLENOL) 500 MG tablet Take 2 tablets by mouth every 6 hours as needed for Pain, Disp-112 tablet, R-0Print      penicillin v potassium (VEETID) 500 MG tablet Take 1 tablet by mouth 4 times daily for 7 days, Disp-28 tablet, R-0Print             Christopher Flor DO  Emergency Medicine Resident, PGY 2    (Please note that portions of this note were completed with a voice recognition program.  Efforts were made to edit the dictations but occasionally words are mis-transcribed.)       Christopher Flor DO  Resident  02/14/23 [id number]

## 2023-02-14 NOTE — DISCHARGE INSTRUCTIONS
You were evaluated in the emergency department for facial swelling and mouth pain.  You were found to have concerns of a infection inside of your mouth.  You were given antibiotics in the emergency department.  Please follow-up with a dentist.  Contact information given with discharge paperwork.  Please call them today to set up an appointment for as soon as possible.  Please follow-up with your primary care physician this week.  Please return to the emergency department for any worsening symptoms including worsening swelling, worsening pain, difficulty swallowing, change in voice, shortness of breath, chest pain, fever/chills that does not go away with Tylenol or ibuprofen, inability to eat or drink, nausea or vomiting, difficulty opening your mouth.  You were given a prescription for antibiotic.  Please take this medication as prescribed.  Please finish the entire course of antibiotic even if you start to feel better.  You were given a prescription for ibuprofen.  You can take 800 mg every 8 hours.  Do not take more than 3200 mg of ibuprofen in a 24-hour period.  You were given a prescription for Tylenol.  You can take 1000 mg of Tylenol every 6 hours.  Do not take more than 4000 mg of Tylenol in a 24-hour period.

## 2023-02-14 NOTE — ED NOTES
Pt presented to ED via triage for the complaint of left lower dental pain. Pt denies trauma to jaw. Pt denies dental care.  Pt alert and oriented x 4. RR even and non labored     Thony Carter RN  02/14/23 [id number]

## 2023-02-14 NOTE — ED PROVIDER NOTES
[facility] 2     Emergency Department     Faculty Attestation    I performed a history and physical examination of the patient and discussed management with the resident. I reviewed the residents note and agree with the documented findings and plan of care. Any areas of disagreement are noted on the chart. I was personally present for the key portions of any procedures. I have documented in the chart those procedures where I was not present during the key portions. I have reviewed the emergency nurses triage note. I agree with the chief complaint, past medical history, past surgical history, allergies, medications, social and family history as documented unless otherwise noted below. Documentation of the HPI, Physical Exam and Medical Decision Making performed by medical students or scribes is based on my personal performance of the HPI, PE and MDM. For Physician Assistant/ Nurse Practitioner cases/documentation I have personally evaluated this patient and have completed at least one if not all key elements of the E/M (history, physical exam, and MDM). Additional findings are as noted. Primary Care Physician: JAM Ruiz CNP    History: This is a 23 y.o. female who presents to the Emergency Department with complaint of Dental pain. Is been ongoing for last 3 days. Patient denies any fever, chills or sweats. The patient denies any difficulty swallowing or shortness of breath    Physical:   weight is 180 lb (81.6 kg). Her temperature is 98.4 °F (36.9 °C). Her blood pressure is 125/92 (abnormal) and her pulse is 89. Her respiration is 16 and oxygen saturation is 99%. The patient has multiple dental caries noted. There is no tongue elevation noted. The patient has no abscess. Airways patent there is no pooling of oral secretions.      Impression: Dental caries    Plan: Antibiotics, analgesia and dentist follow-up    Olga Lidia Bolivar MD, [address], [facility]  Attending Emergency  Physician        Cayetano Alves MD  02/14/23 [id number]

## 2023-05-07 ENCOUNTER — HOSPITAL ENCOUNTER (EMERGENCY)
Age: 23
Discharge: HOME | End: 2023-05-07
Attending: EMERGENCY MEDICINE
Payer: COMMERCIAL

## 2023-05-07 VITALS
TEMPERATURE: 98.6 F | SYSTOLIC BLOOD PRESSURE: 152 MMHG | HEART RATE: 89 BPM | OXYGEN SATURATION: 100 % | RESPIRATION RATE: 16 BRPM | DIASTOLIC BLOOD PRESSURE: 75 MMHG

## 2023-05-07 DIAGNOSIS — S81.811A LACERATION OF RIGHT LOWER EXTREMITY, INITIAL ENCOUNTER: Primary | ICD-10-CM

## 2023-05-07 PROCEDURE — 99282 EMERGENCY DEPT VISIT SF MDM: CPT

## 2023-05-07 PROCEDURE — 12002 RPR S/N/AX/GEN/TRNK2.6-7.5CM: CPT

## 2023-05-07 ASSESSMENT — ENCOUNTER SYMPTOMS: SHORTNESS OF BREATH: 0

## 2023-05-13 ENCOUNTER — HOSPITAL ENCOUNTER (EMERGENCY)
Age: 23
Discharge: HOME | End: 2023-05-13

## 2023-05-17 ENCOUNTER — APPOINTMENT (OUTPATIENT)
Dept: GENERAL RADIOLOGY | Age: 23
End: 2023-05-17
Payer: COMMERCIAL

## 2023-05-17 ENCOUNTER — HOSPITAL ENCOUNTER (EMERGENCY)
Age: 23
Discharge: HOME | End: 2023-05-17
Attending: EMERGENCY MEDICINE
Payer: COMMERCIAL

## 2023-05-17 VITALS
RESPIRATION RATE: 18 BRPM | TEMPERATURE: 97 F | HEART RATE: 74 BPM | SYSTOLIC BLOOD PRESSURE: 138 MMHG | DIASTOLIC BLOOD PRESSURE: 98 MMHG | OXYGEN SATURATION: 97 %

## 2023-05-17 DIAGNOSIS — S92.355A CLOSED NONDISPLACED FRACTURE OF FIFTH METATARSAL BONE OF LEFT FOOT, INITIAL ENCOUNTER: ICD-10-CM

## 2023-05-17 DIAGNOSIS — S93.602A SPRAIN OF LEFT FOOT, INITIAL ENCOUNTER: Primary | ICD-10-CM

## 2023-05-17 PROCEDURE — 99283 EMERGENCY DEPT VISIT LOW MDM: CPT

## 2023-05-17 PROCEDURE — 73630 X-RAY EXAM OF FOOT: CPT

## 2023-05-17 PROCEDURE — 6370000000 HC RX 637 (ALT 250 FOR IP): Performed by: EMERGENCY MEDICINE

## 2023-05-17 RX ORDER — IBUPROFEN 800 MG/1
800 TABLET ORAL ONCE
Status: COMPLETED | OUTPATIENT
Start: 2023-05-17 | End: 2023-05-17

## 2023-05-17 RX ORDER — HYDROCODONE BITARTRATE AND ACETAMINOPHEN 5; 325 MG/1; MG/1
1 TABLET ORAL EVERY 6 HOURS PRN
Qty: 10 TABLET | Refills: 0 | Status: SHIPPED | OUTPATIENT
Start: 2023-05-17 | End: 2023-05-20

## 2023-05-17 RX ADMIN — IBUPROFEN 800 MG: 800 TABLET, FILM COATED ORAL at 11:34

## 2023-05-17 ASSESSMENT — PAIN DESCRIPTION - LOCATION
LOCATION: LEG
LOCATION: ANKLE;FOOT

## 2023-05-17 ASSESSMENT — PAIN - FUNCTIONAL ASSESSMENT: PAIN_FUNCTIONAL_ASSESSMENT: 0-10

## 2023-05-17 ASSESSMENT — PAIN SCALES - GENERAL
PAINLEVEL_OUTOF10: 4
PAINLEVEL_OUTOF10: 8

## 2023-05-17 ASSESSMENT — PAIN DESCRIPTION - ORIENTATION: ORIENTATION: LEFT

## 2023-05-17 ASSESSMENT — PAIN DESCRIPTION - DESCRIPTORS: DESCRIPTORS: DISCOMFORT

## 2023-05-17 ASSESSMENT — ENCOUNTER SYMPTOMS: COUGH: 0

## 2023-05-17 NOTE — ED PROVIDER NOTES
[address] [facility] [address]  ED  EMERGENCY DEPARTMENT ENCOUNTER      Pt Name: Elizabeth Taylor  MRN: [id number]  [place] 2000  Date of evaluation: 5/17/23  PCP:  JAM Presley CNP    CHIEF COMPLAINT:   Chief Complaint   Patient presents with    Foot Pain     Left/mechanical fall PMS/ROM intact     HISTORY OF PRESENT ILLNESS   Elizabeth Taylor is a 23 y.o. female whopresents with with left foot pain. She states she fell and inverted her foot 3 days ago and still has pain. She not hit her head or neck lose conscious complains of pain focally in the foot denies any pain in her ankle or any worse in her lower extremity. No anticoagulation use. Denies any possibility pregnancy        REVIEW OF SYSTEMS       Review of Systems   Constitutional:  Negative for chills and fever. Respiratory:  Negative for cough. Cardiovascular:  Negative for chest pain. Neurological:  Negative for headaches. PAST MEDICAL HISTORY   PMH:  has a past medical history of BV (bacterial vaginosis), Chlamydia, Chlamydia infection, Chlamydia infection affecting pregnancy, cHTN (no meds), Closed fracture of phalanx of middle finger, Gonorrhea, History of blood transfusion, Seasonal allergies, Strep throat, Trichomonas infection, and Trichomonas vaginitis. SurgicalHistory:  has no past surgical history on file. Social History:  reports that she has never smoked. She has been exposed to tobacco smoke. She has never used smokeless tobacco. She reports that she does not currently use alcohol. She reports that she does not use drugs. Family History: Noncontributory at this time  Psychiatric History: Noncontributory at this time    Allergies:has No Known Allergies. PHYSICAL EXAM     INITIAL VITALS: BP (!) 138/98   Pulse 74   Temp 97 °F (36.1 °C) (Oral)   Resp 18   SpO2 97%      Physical Exam  Constitutional:       General: She is not in acute distress. Appearance: She is well-developed. She is not diaphoretic.

## 2023-08-02 ENCOUNTER — HOSPITAL ENCOUNTER (EMERGENCY)
Age: 23
Discharge: HOME | End: 2023-08-02
Attending: EMERGENCY MEDICINE
Payer: COMMERCIAL

## 2023-08-02 VITALS
RESPIRATION RATE: 17 BRPM | TEMPERATURE: 98.4 F | BODY MASS INDEX: 33.27 KG/M2 | SYSTOLIC BLOOD PRESSURE: 135 MMHG | OXYGEN SATURATION: 99 % | HEART RATE: 104 BPM | HEIGHT: 62 IN | DIASTOLIC BLOOD PRESSURE: 81 MMHG | WEIGHT: 180.78 LBS

## 2023-08-02 DIAGNOSIS — Z20.2 EXPOSURE TO CHLAMYDIA: Primary | ICD-10-CM

## 2023-08-02 DIAGNOSIS — Z32.01 POSITIVE URINE PREGNANCY TEST: ICD-10-CM

## 2023-08-02 LAB
BACTERIA URNS QL MICRO: ABNORMAL
BILIRUB UR QL STRIP: NEGATIVE
CANDIDA SPECIES: NEGATIVE
CASTS #/AREA URNS LPF: ABNORMAL /LPF (ref 0–8)
CLARITY UR: ABNORMAL
COLOR UR: YELLOW
EPI CELLS #/AREA URNS HPF: ABNORMAL /HPF (ref 0–5)
GARDNERELLA VAGINALIS: POSITIVE
GLUCOSE UR STRIP-MCNC: NEGATIVE MG/DL
HCG UR QL: POSITIVE
HGB UR QL STRIP.AUTO: NEGATIVE
KETONES UR STRIP-MCNC: NEGATIVE MG/DL
LEUKOCYTE ESTERASE UR QL STRIP: NEGATIVE
NITRITE UR QL STRIP: NEGATIVE
PH UR STRIP: 6 [PH] (ref 5–8)
PROT UR STRIP-MCNC: ABNORMAL MG/DL
RBC #/AREA URNS HPF: ABNORMAL /HPF (ref 0–4)
SOURCE: ABNORMAL
SP GR UR STRIP: 1.03 (ref 1–1.03)
TRICHOMONAS: NEGATIVE
UROBILINOGEN UR STRIP-ACNC: NORMAL EU/DL (ref 0–1)
WBC #/AREA URNS HPF: ABNORMAL /HPF (ref 0–5)

## 2023-08-02 PROCEDURE — 81001 URINALYSIS AUTO W/SCOPE: CPT

## 2023-08-02 PROCEDURE — 99283 EMERGENCY DEPT VISIT LOW MDM: CPT

## 2023-08-02 PROCEDURE — 87480 CANDIDA DNA DIR PROBE: CPT

## 2023-08-02 PROCEDURE — 87491 CHLMYD TRACH DNA AMP PROBE: CPT

## 2023-08-02 PROCEDURE — 87510 GARDNER VAG DNA DIR PROBE: CPT

## 2023-08-02 PROCEDURE — 6370000000 HC RX 637 (ALT 250 FOR IP): Performed by: EMERGENCY MEDICINE

## 2023-08-02 PROCEDURE — 81025 URINE PREGNANCY TEST: CPT

## 2023-08-02 PROCEDURE — 87660 TRICHOMONAS VAGIN DIR PROBE: CPT

## 2023-08-02 PROCEDURE — 87591 N.GONORRHOEAE DNA AMP PROB: CPT

## 2023-08-02 RX ORDER — PNV NO.95/FERROUS FUM/FOLIC AC 28MG-0.8MG
1 TABLET ORAL DAILY
Qty: 30 TABLET | Refills: 0 | Status: SHIPPED | OUTPATIENT
Start: 2023-08-02

## 2023-08-02 RX ORDER — AZITHROMYCIN 250 MG/1
1000 TABLET, FILM COATED ORAL ONCE
Status: COMPLETED | OUTPATIENT
Start: 2023-08-02 | End: 2023-08-02

## 2023-08-02 RX ADMIN — AZITHROMYCIN 1000 MG: 250 TABLET, FILM COATED ORAL at 09:28

## 2023-08-02 ASSESSMENT — PAIN - FUNCTIONAL ASSESSMENT: PAIN_FUNCTIONAL_ASSESSMENT: NONE - DENIES PAIN

## 2023-08-02 NOTE — DISCHARGE INSTRUCTIONS
You have been treated for chlamydia infection. Your urine pregnancy test was positive. Please start taking the prenatal vitamins that have been prescribed. Call the [facility] office today for a follow-up appointment. Return to the emergency department have any abdominal pain, vaginal bleeding, lightheadedness, fever or any other concerns    THANK YOU!!!    From [facility] Emergency Department    On behalf of the Emergency Department staff at [facility]. [facility] Emergency Department, I would like to thank you for giving [facility] the opportunity to address your health care needs and concerns. We hope that during your visit, our service was delivered in a professional and caring manner. Please keep [facility] in mind as we walk with you down the path to your own personal wellness. Please expect an automated phone call from [phone] so we can ask a few questions about your health and progress. Based on your answers, a clinician may call you back to offer help and instructions.

## 2023-08-02 NOTE — ED TRIAGE NOTES
Pt to ED for and STD check. Pt denies vaginal discharge or irritation. Pt states her partner was seen yesterday and was positive for chlamydia. Pt states she just wants to be checked. Pt states there is also a possibility of being pregnant.

## 2023-08-03 LAB
C TRACH DNA SPEC QL PROBE+SIG AMP: NEGATIVE
N GONORRHOEA DNA SPEC QL PROBE+SIG AMP: NEGATIVE
SPECIMEN DESCRIPTION: NORMAL

## 2023-08-13 ENCOUNTER — HOSPITAL ENCOUNTER (EMERGENCY)
Age: 23
Discharge: HOME | End: 2023-08-13
Attending: EMERGENCY MEDICINE
Payer: COMMERCIAL

## 2023-08-13 ENCOUNTER — APPOINTMENT (OUTPATIENT)
Dept: ULTRASOUND IMAGING | Age: 23
End: 2023-08-13
Payer: COMMERCIAL

## 2023-08-13 VITALS
DIASTOLIC BLOOD PRESSURE: 82 MMHG | TEMPERATURE: 97.5 F | WEIGHT: 177.47 LBS | HEIGHT: 63 IN | BODY MASS INDEX: 31.45 KG/M2 | OXYGEN SATURATION: 99 % | HEART RATE: 98 BPM | SYSTOLIC BLOOD PRESSURE: 130 MMHG | RESPIRATION RATE: 19 BRPM

## 2023-08-13 DIAGNOSIS — R10.30 LOWER ABDOMINAL PAIN: Primary | ICD-10-CM

## 2023-08-13 DIAGNOSIS — R07.9 CHEST PAIN, UNSPECIFIED TYPE: ICD-10-CM

## 2023-08-13 LAB
ANION GAP SERPL CALCULATED.3IONS-SCNC: 14 MMOL/L (ref 9–17)
B-HCG SERPL EIA 3RD IS-ACNC: ABNORMAL MIU/ML
BACTERIA URNS QL MICRO: ABNORMAL
BASOPHILS # BLD: <0.03 K/UL (ref 0–0.2)
BASOPHILS NFR BLD: 0 % (ref 0–2)
BILIRUB UR QL STRIP: NEGATIVE
BUN SERPL-MCNC: 11 MG/DL (ref 6–20)
CALCIUM SERPL-MCNC: 9.3 MG/DL (ref 8.6–10.4)
CASTS #/AREA URNS LPF: ABNORMAL /LPF (ref 0–8)
CHLORIDE SERPL-SCNC: 101 MMOL/L (ref 98–107)
CLARITY UR: ABNORMAL
CO2 SERPL-SCNC: 23 MMOL/L (ref 20–31)
COLOR UR: YELLOW
CREAT SERPL-MCNC: 0.5 MG/DL (ref 0.5–0.9)
D DIMER PPP FEU-MCNC: 0.35 UG/ML FEU (ref 0–0.57)
EOSINOPHIL # BLD: 0.06 K/UL (ref 0–0.44)
EOSINOPHILS RELATIVE PERCENT: 1 % (ref 1–4)
EPI CELLS #/AREA URNS HPF: ABNORMAL /HPF (ref 0–5)
ERYTHROCYTE [DISTWIDTH] IN BLOOD BY AUTOMATED COUNT: 14.8 % (ref 11.8–14.4)
GFR SERPL CREATININE-BSD FRML MDRD: >60 ML/MIN/1.73M2
GLUCOSE SERPL-MCNC: 86 MG/DL (ref 70–99)
GLUCOSE UR STRIP-MCNC: NEGATIVE MG/DL
HCT VFR BLD AUTO: 37.8 % (ref 36.3–47.1)
HGB BLD-MCNC: 12.1 G/DL (ref 11.9–15.1)
HGB UR QL STRIP.AUTO: NEGATIVE
IMM GRANULOCYTES # BLD AUTO: 0.05 K/UL (ref 0–0.3)
IMM GRANULOCYTES NFR BLD: 0 %
KETONES UR STRIP-MCNC: ABNORMAL MG/DL
LEUKOCYTE ESTERASE UR QL STRIP: NEGATIVE
LYMPHOCYTES NFR BLD: 2.64 K/UL (ref 1.1–3.7)
LYMPHOCYTES RELATIVE PERCENT: 22 % (ref 25–45)
MCH RBC QN AUTO: 25.3 PG (ref 25.2–33.5)
MCHC RBC AUTO-ENTMCNC: 32 G/DL (ref 28.4–34.8)
MCV RBC AUTO: 79.1 FL (ref 82.6–102.9)
MONOCYTES NFR BLD: 0.84 K/UL (ref 0.1–1.4)
MONOCYTES NFR BLD: 7 % (ref 2–8)
NEUTROPHILS NFR BLD: 70 % (ref 34–64)
NEUTS SEG NFR BLD: 8.48 K/UL (ref 1.5–8.1)
NITRITE UR QL STRIP: NEGATIVE
NRBC BLD-RTO: 0 PER 100 WBC
PH UR STRIP: 6 [PH] (ref 5–8)
PLATELET # BLD AUTO: 281 K/UL (ref 138–453)
PMV BLD AUTO: 10 FL (ref 8.1–13.5)
POTASSIUM SERPL-SCNC: 3.5 MMOL/L (ref 3.7–5.3)
PROT UR STRIP-MCNC: ABNORMAL MG/DL
RBC # BLD AUTO: 4.78 M/UL (ref 3.95–5.11)
RBC # BLD: ABNORMAL 10*6/UL
RBC #/AREA URNS HPF: ABNORMAL /HPF (ref 0–4)
SODIUM SERPL-SCNC: 138 MMOL/L (ref 135–144)
SP GR UR STRIP: 1.03 (ref 1–1.03)
UROBILINOGEN UR STRIP-ACNC: NORMAL EU/DL (ref 0–1)
WBC #/AREA URNS HPF: ABNORMAL /HPF (ref 0–5)
WBC OTHER # BLD: 12.1 K/UL (ref 4.5–13.5)

## 2023-08-13 PROCEDURE — 93005 ELECTROCARDIOGRAM TRACING: CPT | Performed by: STUDENT IN AN ORGANIZED HEALTH CARE EDUCATION/TRAINING PROGRAM

## 2023-08-13 PROCEDURE — 99284 EMERGENCY DEPT VISIT MOD MDM: CPT

## 2023-08-13 PROCEDURE — 93975 VASCULAR STUDY: CPT

## 2023-08-13 PROCEDURE — 85025 COMPLETE CBC W/AUTO DIFF WBC: CPT

## 2023-08-13 PROCEDURE — 87086 URINE CULTURE/COLONY COUNT: CPT

## 2023-08-13 PROCEDURE — 76817 TRANSVAGINAL US OBSTETRIC: CPT

## 2023-08-13 PROCEDURE — 85379 FIBRIN DEGRADATION QUANT: CPT

## 2023-08-13 PROCEDURE — 80048 BASIC METABOLIC PNL TOTAL CA: CPT

## 2023-08-13 PROCEDURE — 84702 CHORIONIC GONADOTROPIN TEST: CPT

## 2023-08-13 PROCEDURE — 81001 URINALYSIS AUTO W/SCOPE: CPT

## 2023-08-13 RX ORDER — ACETAMINOPHEN 500 MG
1000 TABLET ORAL ONCE
Status: DISCONTINUED | OUTPATIENT
Start: 2023-08-13 | End: 2023-08-13 | Stop reason: HOSPADM

## 2023-08-13 ASSESSMENT — ENCOUNTER SYMPTOMS
VOMITING: 0
ABDOMINAL PAIN: 1
CONSTIPATION: 0
WHEEZING: 0
DIARRHEA: 0
COUGH: 0
BLOOD IN STOOL: 0
NAUSEA: 0
BACK PAIN: 1
SHORTNESS OF BREATH: 1

## 2023-08-13 ASSESSMENT — PAIN - FUNCTIONAL ASSESSMENT: PAIN_FUNCTIONAL_ASSESSMENT: 0-10

## 2023-08-13 ASSESSMENT — PAIN SCALES - GENERAL: PAINLEVEL_OUTOF10: 5

## 2023-08-13 ASSESSMENT — PAIN DESCRIPTION - ORIENTATION: ORIENTATION: LOWER

## 2023-08-13 ASSESSMENT — PAIN DESCRIPTION - FREQUENCY: FREQUENCY: INTERMITTENT

## 2023-08-13 ASSESSMENT — PAIN DESCRIPTION - LOCATION: LOCATION: BACK

## 2023-08-13 ASSESSMENT — PAIN DESCRIPTION - DESCRIPTORS: DESCRIPTORS: CRAMPING

## 2023-08-13 NOTE — DISCHARGE INSTRUCTIONS
Be sure to follow-up with your OB/GYN. Return to emergency department for any acutely worsening symptoms or any other acute concerns.

## 2023-08-13 NOTE — ED PROVIDER NOTES
[address] ED  Emergency Department Encounter  Emergency Medicine Resident     Pt Name:Hodan Harvey  MRN: [id number]  [address] [facility], [place] 2000  Date of evaluation: 23  PCP:  JAM Reid CNP  Note Started: 4:56 PM EDT      CHIEF COMPLAINT       Chief Complaint   Patient presents with    Back Pain       HISTORY OF PRESENT ILLNESS  (Location/Symptom, Timing/Onset, Context/Setting, Quality, Duration, Modifying Factors, Severity.)      Betsy Holbrook is a 26 y.o.  female who presents with multiple complaints. Patient states she is pregnant, unsure gestational age, states last menstrual period \"sometime in [facility]". Positive pregnancy test at ED visit 11 days ago. Currently on prenatal vitamin, has not established with OB yet but has an upcoming appointment. Reports symptoms started approximately 20 minutes prior to arrival.  Reports left lower quadrant and suprapubic abdominal pain, moderate in intensity, cramping in nature, constant since onset. Denies vaginal bleeding, discharge, gush of fluids. Reports left-sided low back pain, moderate in intensity, sore in nature, constant since onset. Reports sternal chest pain, moderate in intensity, sharp in nature, nonradiating, constant since onset, worsened with deep breathing and palpation. Endorses feeling mildly short of breath. Denies fever, chills, nausea, vomiting, dysuria, hematuria, constipation, diarrhea, melena, hematochezia. PAST MEDICAL / SURGICAL / SOCIAL / FAMILY HISTORY      has a past medical history of BV (bacterial vaginosis), Chlamydia, Chlamydia infection, Chlamydia infection affecting pregnancy, cHTN (no meds), Closed fracture of phalanx of middle finger, Gonorrhea, History of blood transfusion, Seasonal allergies, Strep throat, Trichomonas infection, and Trichomonas vaginitis. has no past surgical history on file.       Social History     Socioeconomic History    Marital status: Single

## 2023-08-14 LAB
EKG ATRIAL RATE: 80 BPM
EKG P AXIS: 37 DEGREES
EKG P-R INTERVAL: 144 MS
EKG Q-T INTERVAL: 396 MS
EKG QRS DURATION: 100 MS
EKG QTC CALCULATION (BAZETT): 456 MS
EKG R AXIS: 86 DEGREES
EKG T AXIS: 25 DEGREES
EKG VENTRICULAR RATE: 80 BPM
MICROORGANISM SPEC CULT: NORMAL
SPECIMEN DESCRIPTION: NORMAL

## 2023-08-14 PROCEDURE — 93010 ELECTROCARDIOGRAM REPORT: CPT | Performed by: INTERNAL MEDICINE

## 2023-08-15 ENCOUNTER — TELEPHONE (OUTPATIENT)
Dept: OBGYN | Age: 23
End: 2023-08-15

## 2023-08-17 NOTE — TELEPHONE ENCOUNTER
Spoke to patient and she is unsure if she is going to continue with pregnancy. She was instructed to call the office for an appointment for [facility] Education if she chooses to keep pregnancy.

## 2023-10-13 ENCOUNTER — HOSPITAL ENCOUNTER (EMERGENCY)
Age: 23
Discharge: HOME | End: 2023-10-13
Attending: EMERGENCY MEDICINE
Payer: COMMERCIAL

## 2023-10-13 ENCOUNTER — APPOINTMENT (OUTPATIENT)
Dept: ULTRASOUND IMAGING | Age: 23
End: 2023-10-13
Payer: COMMERCIAL

## 2023-10-13 VITALS
RESPIRATION RATE: 18 BRPM | BODY MASS INDEX: 32.46 KG/M2 | OXYGEN SATURATION: 99 % | HEART RATE: 71 BPM | DIASTOLIC BLOOD PRESSURE: 78 MMHG | HEIGHT: 62 IN | TEMPERATURE: 98.2 F | SYSTOLIC BLOOD PRESSURE: 125 MMHG

## 2023-10-13 DIAGNOSIS — B96.89 BACTERIAL VAGINOSIS: Primary | ICD-10-CM

## 2023-10-13 DIAGNOSIS — N93.9 VAGINAL BLEEDING: ICD-10-CM

## 2023-10-13 DIAGNOSIS — N39.0 URINARY TRACT INFECTION IN FEMALE: ICD-10-CM

## 2023-10-13 DIAGNOSIS — N76.0 BACTERIAL VAGINOSIS: Primary | ICD-10-CM

## 2023-10-13 LAB
ABO + RH BLD: NORMAL
ALBUMIN SERPL-MCNC: 3.9 G/DL (ref 3.5–5.2)
ALBUMIN/GLOB SERPL: 1.3 {RATIO} (ref 1–2.5)
ALP SERPL-CCNC: 73 U/L (ref 35–104)
ALT SERPL-CCNC: 8 U/L (ref 5–33)
ANION GAP SERPL CALCULATED.3IONS-SCNC: 12 MMOL/L (ref 9–17)
ARM BAND NUMBER: NORMAL
AST SERPL-CCNC: 12 U/L
B-HCG SERPL EIA 3RD IS-ACNC: <1 MIU/ML
BASOPHILS # BLD: 0.05 K/UL (ref 0–0.2)
BASOPHILS NFR BLD: 1 % (ref 0–2)
BILIRUB SERPL-MCNC: <0.1 MG/DL (ref 0.3–1.2)
BILIRUB UR QL STRIP: NEGATIVE
BLOOD BANK SAMPLE EXPIRATION: NORMAL
BLOOD GROUP ANTIBODIES SERPL: NEGATIVE
BUN SERPL-MCNC: 13 MG/DL (ref 6–20)
CALCIUM SERPL-MCNC: 9.2 MG/DL (ref 8.6–10.4)
CANDIDA SPECIES: NEGATIVE
CASTS #/AREA URNS LPF: NORMAL /LPF (ref 0–8)
CHLORIDE SERPL-SCNC: 102 MMOL/L (ref 98–107)
CHP ED QC CHECK: NORMAL
CLARITY UR: CLEAR
CO2 SERPL-SCNC: 24 MMOL/L (ref 20–31)
COLOR UR: YELLOW
CREAT SERPL-MCNC: 0.6 MG/DL (ref 0.5–0.9)
EOSINOPHIL # BLD: 0.11 K/UL (ref 0–0.44)
EOSINOPHILS RELATIVE PERCENT: 1 % (ref 1–4)
EPI CELLS #/AREA URNS HPF: NORMAL /HPF (ref 0–5)
ERYTHROCYTE [DISTWIDTH] IN BLOOD BY AUTOMATED COUNT: 15.1 % (ref 11.8–14.4)
GARDNERELLA VAGINALIS: POSITIVE
GFR SERPL CREATININE-BSD FRML MDRD: >60 ML/MIN/1.73M2
GLUCOSE SERPL-MCNC: 104 MG/DL (ref 70–99)
GLUCOSE UR STRIP-MCNC: NEGATIVE MG/DL
HCT VFR BLD AUTO: 36.7 % (ref 36.3–47.1)
HGB BLD-MCNC: 11.6 G/DL (ref 11.9–15.1)
HGB UR QL STRIP.AUTO: ABNORMAL
IMM GRANULOCYTES # BLD AUTO: 0.07 K/UL (ref 0–0.3)
IMM GRANULOCYTES NFR BLD: 1 %
KETONES UR STRIP-MCNC: NEGATIVE MG/DL
LEUKOCYTE ESTERASE UR QL STRIP: ABNORMAL
LYMPHOCYTES NFR BLD: 3.7 K/UL (ref 1.1–3.7)
LYMPHOCYTES RELATIVE PERCENT: 36 % (ref 25–45)
MCH RBC QN AUTO: 25.6 PG (ref 25.2–33.5)
MCHC RBC AUTO-ENTMCNC: 31.6 G/DL (ref 28.4–34.8)
MCV RBC AUTO: 80.8 FL (ref 82.6–102.9)
MONOCYTES NFR BLD: 0.89 K/UL (ref 0.1–1.4)
MONOCYTES NFR BLD: 9 % (ref 2–8)
NEUTROPHILS NFR BLD: 52 % (ref 34–64)
NEUTS SEG NFR BLD: 5.42 K/UL (ref 1.5–8.1)
NITRITE UR QL STRIP: NEGATIVE
NRBC BLD-RTO: 0 PER 100 WBC
PH UR STRIP: 8 [PH] (ref 5–8)
PLATELET # BLD AUTO: 295 K/UL (ref 138–453)
PMV BLD AUTO: 11 FL (ref 8.1–13.5)
POTASSIUM SERPL-SCNC: 3.8 MMOL/L (ref 3.7–5.3)
PREGNANCY TEST URINE, POC: NEGATIVE
PROT SERPL-MCNC: 6.9 G/DL (ref 6.4–8.3)
PROT UR STRIP-MCNC: NEGATIVE MG/DL
RBC # BLD AUTO: 4.54 M/UL (ref 3.95–5.11)
RBC # BLD: ABNORMAL 10*6/UL
RBC #/AREA URNS HPF: NORMAL /HPF (ref 0–4)
SODIUM SERPL-SCNC: 138 MMOL/L (ref 135–144)
SOURCE: ABNORMAL
SP GR UR STRIP: 1.02 (ref 1–1.03)
TRICHOMONAS: NEGATIVE
UROBILINOGEN UR STRIP-ACNC: NORMAL EU/DL (ref 0–1)
WBC #/AREA URNS HPF: NORMAL /HPF (ref 0–5)
WBC OTHER # BLD: 10.2 K/UL (ref 4.5–13.5)

## 2023-10-13 PROCEDURE — 96374 THER/PROPH/DIAG INJ IV PUSH: CPT | Performed by: EMERGENCY MEDICINE

## 2023-10-13 PROCEDURE — 87591 N.GONORRHOEAE DNA AMP PROB: CPT

## 2023-10-13 PROCEDURE — 76830 TRANSVAGINAL US NON-OB: CPT

## 2023-10-13 PROCEDURE — 87491 CHLMYD TRACH DNA AMP PROBE: CPT

## 2023-10-13 PROCEDURE — 86850 RBC ANTIBODY SCREEN: CPT

## 2023-10-13 PROCEDURE — 87660 TRICHOMONAS VAGIN DIR PROBE: CPT

## 2023-10-13 PROCEDURE — 86900 BLOOD TYPING SEROLOGIC ABO: CPT

## 2023-10-13 PROCEDURE — 96372 THER/PROPH/DIAG INJ SC/IM: CPT | Performed by: EMERGENCY MEDICINE

## 2023-10-13 PROCEDURE — 87086 URINE CULTURE/COLONY COUNT: CPT

## 2023-10-13 PROCEDURE — 86403 PARTICLE AGGLUT ANTBDY SCRN: CPT

## 2023-10-13 PROCEDURE — 87510 GARDNER VAG DNA DIR PROBE: CPT

## 2023-10-13 PROCEDURE — 96375 TX/PRO/DX INJ NEW DRUG ADDON: CPT | Performed by: EMERGENCY MEDICINE

## 2023-10-13 PROCEDURE — 84702 CHORIONIC GONADOTROPIN TEST: CPT

## 2023-10-13 PROCEDURE — 81001 URINALYSIS AUTO W/SCOPE: CPT

## 2023-10-13 PROCEDURE — 87186 SC STD MICRODIL/AGAR DIL: CPT

## 2023-10-13 PROCEDURE — 80053 COMPREHEN METABOLIC PANEL: CPT

## 2023-10-13 PROCEDURE — 6360000002 HC RX W HCPCS

## 2023-10-13 PROCEDURE — 99284 EMERGENCY DEPT VISIT MOD MDM: CPT | Performed by: STUDENT IN AN ORGANIZED HEALTH CARE EDUCATION/TRAINING PROGRAM

## 2023-10-13 PROCEDURE — 85025 COMPLETE CBC W/AUTO DIFF WBC: CPT

## 2023-10-13 PROCEDURE — 86901 BLOOD TYPING SEROLOGIC RH(D): CPT

## 2023-10-13 PROCEDURE — 87480 CANDIDA DNA DIR PROBE: CPT

## 2023-10-13 PROCEDURE — 93975 VASCULAR STUDY: CPT

## 2023-10-13 PROCEDURE — 99284 EMERGENCY DEPT VISIT MOD MDM: CPT | Performed by: EMERGENCY MEDICINE

## 2023-10-13 PROCEDURE — 87088 URINE BACTERIA CULTURE: CPT

## 2023-10-13 RX ORDER — METRONIDAZOLE 500 MG/1
500 TABLET ORAL 2 TIMES DAILY
Qty: 14 TABLET | Refills: 0 | Status: SHIPPED | OUTPATIENT
Start: 2023-10-13 | End: 2023-10-20

## 2023-10-13 RX ORDER — MEDROXYPROGESTERONE ACETATE 150 MG/ML
150 INJECTION, SUSPENSION INTRAMUSCULAR ONCE
Status: COMPLETED | OUTPATIENT
Start: 2023-10-13 | End: 2023-10-13

## 2023-10-13 RX ORDER — ONDANSETRON 2 MG/ML
4 INJECTION INTRAMUSCULAR; INTRAVENOUS ONCE
Status: COMPLETED | OUTPATIENT
Start: 2023-10-13 | End: 2023-10-13

## 2023-10-13 RX ORDER — MORPHINE SULFATE 4 MG/ML
4 INJECTION, SOLUTION INTRAMUSCULAR; INTRAVENOUS ONCE
Status: COMPLETED | OUTPATIENT
Start: 2023-10-13 | End: 2023-10-13

## 2023-10-13 RX ORDER — NITROFURANTOIN 25; 75 MG/1; MG/1
100 CAPSULE ORAL 2 TIMES DAILY
Qty: 20 CAPSULE | Refills: 0 | Status: SHIPPED | OUTPATIENT
Start: 2023-10-13 | End: 2023-10-23

## 2023-10-13 RX ADMIN — ONDANSETRON 4 MG: 2 INJECTION INTRAMUSCULAR; INTRAVENOUS at 13:32

## 2023-10-13 RX ADMIN — MEDROXYPROGESTERONE ACETATE 150 MG: 150 INJECTION, SUSPENSION INTRAMUSCULAR at 19:02

## 2023-10-13 RX ADMIN — MORPHINE SULFATE 4 MG: 4 INJECTION INTRAVENOUS at 13:32

## 2023-10-13 ASSESSMENT — ENCOUNTER SYMPTOMS
CHEST TIGHTNESS: 0
SHORTNESS OF BREATH: 0
DIARRHEA: 0
COUGH: 0
CONSTIPATION: 0
WHEEZING: 0
VOMITING: 1
BACK PAIN: 0
NAUSEA: 1
ABDOMINAL PAIN: 1
COLOR CHANGE: 0

## 2023-10-13 ASSESSMENT — PAIN SCALES - GENERAL
PAINLEVEL_OUTOF10: 0
PAINLEVEL_OUTOF10: 8
PAINLEVEL_OUTOF10: 0

## 2023-10-13 ASSESSMENT — PAIN - FUNCTIONAL ASSESSMENT
PAIN_FUNCTIONAL_ASSESSMENT: 0-10
PAIN_FUNCTIONAL_ASSESSMENT: 0-10

## 2023-10-13 ASSESSMENT — PAIN DESCRIPTION - LOCATION: LOCATION: ABDOMEN

## 2023-10-13 NOTE — ED PROVIDER NOTES
[address] ED  Emergency Department Encounter  Emergency Medicine Resident     Pt Name:Trinity Zulma Harada  MRN: [id number]  [address] [facility] 2000  Date of evaluation: 10/13/23  PCP:  JAM Leonardo CNP  Note Started: 1:25 PM EDT      CHIEF COMPLAINT       Chief Complaint   Patient presents with    Abdominal Pain    Vaginal Bleeding     Passing clots, pregnant       HISTORY OF PRESENT ILLNESS  (Location/Symptom, Timing/Onset, Context/Setting, Quality, Duration, Modifying Factors, Severity.)      Irineo Cruz is a 26 y.o. female, [id number] who presents with abrupt onset pelvic pain that began 5 minutes prior to arrival.  Patient also states that she passed 3 large clots of blood from her vagina following the onset of pain, she continues to have some vaginal bleeding. Patient also has associated nausea and vomiting. Patient states that she found out that she was pregnant back in August and proceeded to have an elective  performed on 2023 at [facility] in [place]. Patient states since her  she had been feeling fine, denied any fever or chills or lower abdominal pain, vaginal bleeding or discharge. Patient does state that she had a regular menstrual period on 2023 following the  procedure. Patient is sexually active currently and uses latex condoms for contraception but is unsure if she could possibly be pregnant again today. PAST MEDICAL / SURGICAL / SOCIAL / FAMILY HISTORY      has a past medical history of BV (bacterial vaginosis), Chlamydia, Chlamydia infection, Chlamydia infection affecting pregnancy, cHTN (no meds), Closed fracture of phalanx of middle finger, Gonorrhea, History of blood transfusion, Seasonal allergies, Strep throat, Trichomonas infection, and Trichomonas vaginitis. has no past surgical history on file.     Social History     Socioeconomic History    Marital status: Single     Spouse name: Not on file

## 2023-10-13 NOTE — DISCHARGE INSTRUCTIONS
OB/GYN is recommending that you take Macrobid and Flagyl which are 2 antibiotics which should help treat your UTI as well as your bacterial vaginosis. Please take these antibiotics as prescribed. They will call you at the OB/GYN clinic within the next week for follow-up appointment. Take your medication as indicated and prescribed. If you are given an antibiotic then, make sure you get the prescription filled and take the antibiotics until finished. Drink plenty of water while taking the antibiotics. Avoid drinking alcohol or drinks that have caffeine in it while taking antibiotics. If you were given the medication pyridium (or take over the counter Azo) - do not wear any contacts for the next week since this medication will turn your tears an orange color and will stain the contacts. For pain use acetaminophen (Tylenol) or ibuprofen (Motrin / Advil), unless prescribed medications that have acetaminophen or ibuprofen (or similar medications) in it. You can take over the counter acetaminophen tablets (1 - 2 tablets of the 500-mg strength every 6 hours) or ibuprofen tablets (2 tablets every 4 hours). PLEASE RETURN TO THE EMERGENCY DEPARTMENT IMMEDIATELY for worsening symptoms, inability to urinate, worsening of blood in your urine, or if you develop any concerning symptoms such as: high fever not relieved by acetaminophen (Tylenol) and/or ibuprofen (Motrin / Advil), chills, shortness of breath, chest pain, feeling of your heart fluttering or racing, persistent nausea and/or vomiting, vomiting up blood, blood in your stool, loss of consciousness, numbness, weakness or tingling in the arms or legs or change in color of the extremities, changes in mental status, persistent headache, blurry vision, loss of bladder / bowel.

## 2023-10-13 NOTE — ED TRIAGE NOTES
Pt presents to ED via EMS c/o abd pain and vaginal bleeding. Pt states that about five minutes ago she went to the bathroom and a bunch of blood clots came out her vagina. Pt states that she was pregnant in august and had an  in September. Pt states this was performed in [place]. Pt states that she was not aware that she is pregnant after the . Pt states that the abd pain started prior to the bleeding and feels like cramping. Pt states that this would be the seventh pregnancy. Pt states that she only has one living child and 5 miscarriages and one . Pt states that she still feels like she is bleeding now.

## 2023-10-13 NOTE — CONSULTS
[address]    Patient Name: Ladonna Fields     Patient : 2000  Room/Bed:   Admission Date/Time: 10/13/2023  1:00 PM  Primary Care Physician: JAM Deshpande CNP    Consulting Provider: Dr. Ariela Rainey  Reason for Consult: pelvic pain    CC:   Chief Complaint   Patient presents with    Abdominal Pain    Vaginal Bleeding     Passing clots, pregnant                HPI: Ladonna Fields is a 26 y.o. female [id number] with history of surgical TAB completed on 23 in [place] presents to the ED with the complaint of sudden onset abdominal pain with associated heavy vaginal bleeding. The patient reports no complications with her  or post op course until this afternoon. Her LMP was 23. She had sudden onset RLQ abdominal that radiated across her entire abdomen, nausea, and heavy vaginal bleeding with passing some quarter sized dark red clots. She now reports all her symptoms have resolved, including her bleeding. She has been urinating without difficulty, having regular bowel movements, and tolerating PO intake without vomiting. She denies any fevers or chills.     REVIEW OF SYSTEMS:  Constitutional: negative fever, negative chills  HEENT: negative visual disturbances, negative headaches  Respiratory: negative dyspnea, negative cough  Cardiovascular: negative chest pain,  negative palpitations  Gastrointestinal: negative abdominal pain, negative RUQ pain, negative N/V, negative diarrhea, negative constipation  Genitourinary: negative dysuria, negative vaginal discharge  Dermatological: negative rash  Hematologic: negative bruising  Immunologic/Lymphatic: negative recent illness, negative recent sick contact  Musculoskeletal: negative back pain, negative myalgias, negative arthralgias  Neurological:  negative dizziness, negative weakness  Behavior/Psych: negative depression, negative 28.4 - 34.8 g/dL    RDW 15.1 (H) 11.8 - 14.4 %    Platelets 569 789 - 661 k/uL    MPV 11.0 8.1 - 13.5 fL    NRBC Automated 0.0 0.0 per 100 WBC    Neutrophils % 52 34 - 64 %    Lymphocytes % 36 25 - 45 %    Monocytes % 9 (H) 2 - 8 %    Eosinophils % 1 1 - 4 %    Basophils % 1 0 - 2 %    Immature Granulocytes 1 (H) 0 %    Neutrophils Absolute 5.42 1.50 - 8.10 k/uL    Lymphocytes Absolute 3.70 1.10 - 3.70 k/uL    Monocytes Absolute 0.89 0.10 - 1.40 k/uL    Eosinophils Absolute 0.11 0.00 - 0.44 k/uL    Basophils Absolute 0.05 0.00 - 0.20 k/uL    Absolute Immature Granulocyte 0.07 0.00 - 0.30 k/uL    RBC Morphology ANISOCYTOSIS PRESENT MICROCYTOSIS PRESENT    CMP   Result Value Ref Range    Sodium 138 135 - 144 mmol/L    Potassium 3.8 3.7 - 5.3 mmol/L    Chloride 102 98 - 107 mmol/L    CO2 24 20 - 31 mmol/L    Anion Gap 12 9 - 17 mmol/L    Glucose 104 (H) 70 - 99 mg/dL    BUN 13 6 - 20 mg/dL    Creatinine 0.6 0.5 - 0.9 mg/dL    Est, Glom Filt Rate >60 >60 mL/min/1.73m2    Calcium 9.2 8.6 - 10.4 mg/dL    Total Protein 6.9 6.4 - 8.3 g/dL    Albumin 3.9 3.5 - 5.2 g/dL    Albumin/Globulin Ratio 1.3 1.0 - 2.5    Total Bilirubin <0.1 (L) 0.3 - 1.2 mg/dL    Alkaline Phosphatase 73 35 - 104 U/L    ALT 8 5 - 33 U/L    AST 12 <32 U/L   Urinalysis with Reflex to Culture    Specimen: Urine   Result Value Ref Range    Color, UA Yellow Yellow    Turbidity UA Clear Clear    Glucose, Ur NEGATIVE NEGATIVE mg/dL    Bilirubin Urine NEGATIVE NEGATIVE    Ketones, Urine NEGATIVE NEGATIVE mg/dL    Specific Gravity, UA 1.020 1.005 - 1.030    Urine Hgb LARGE (A) NEGATIVE    pH, UA 8.0 5.0 - 8.0    Protein, UA NEGATIVE NEGATIVE mg/dL    Urobilinogen, Urine Normal 0.0 - 1.0 EU/dL    Nitrite, Urine NEGATIVE NEGATIVE    Leukocyte Esterase, Urine MODERATE (A) NEGATIVE   HCG, Quantitative, Pregnancy   Result Value Ref Range    hCG Quant <1.0 <5 mIU/mL   Microscopic Urinalysis   Result Value Ref Range    WBC, UA 20 TO 50 0 - 5 /HPF    RBC, UA 50

## 2023-10-13 NOTE — ED NOTES
Pt states last period was September 25th. Pt also states that she is currently sexually active and uses latex condoms. Dr. Annel Mcmahon at bedside to evaluate patient and perform ultrasound.       Leroy Gunn RN  10/13/23 2889

## 2023-10-14 PROBLEM — Z28.9 COVID-19 VACCINE SERIES NOT COMPLETED: Status: RESOLVED | Noted: 2022-06-16 | Resolved: 2023-10-14

## 2023-10-14 PROBLEM — N93.9 VAGINAL BLEEDING: Status: ACTIVE | Noted: 2023-10-14

## 2023-10-14 PROBLEM — Z28.311 COVID-19 VACCINE SERIES NOT COMPLETED: Status: RESOLVED | Noted: 2022-06-16 | Resolved: 2023-10-14

## 2023-10-14 PROBLEM — N39.0 URINARY TRACT INFECTION IN FEMALE: Status: ACTIVE | Noted: 2023-10-14

## 2023-10-14 PROBLEM — N94.6 DYSMENORRHEA: Status: ACTIVE | Noted: 2023-10-14

## 2023-10-14 PROBLEM — R10.30 LOWER ABDOMINAL PAIN: Status: ACTIVE | Noted: 2023-10-14

## 2023-10-14 PROBLEM — O03.9 ABORTION: Status: ACTIVE | Noted: 2023-10-14

## 2023-10-15 ENCOUNTER — HOSPITAL ENCOUNTER (EMERGENCY)
Age: 23
Discharge: HOME | End: 2023-10-15
Attending: EMERGENCY MEDICINE
Payer: COMMERCIAL

## 2023-10-15 VITALS
BODY MASS INDEX: 32.78 KG/M2 | WEIGHT: 178.13 LBS | HEIGHT: 62 IN | RESPIRATION RATE: 18 BRPM | SYSTOLIC BLOOD PRESSURE: 121 MMHG | DIASTOLIC BLOOD PRESSURE: 80 MMHG | HEART RATE: 69 BPM | OXYGEN SATURATION: 100 % | TEMPERATURE: 97.7 F

## 2023-10-15 DIAGNOSIS — N30.91 CYSTITIS WITH HEMATURIA: Primary | ICD-10-CM

## 2023-10-15 LAB
ALBUMIN SERPL-MCNC: 3.8 G/DL (ref 3.5–5.2)
ALBUMIN/GLOB SERPL: 1.2 {RATIO} (ref 1–2.5)
ALP SERPL-CCNC: 66 U/L (ref 35–104)
ALT SERPL-CCNC: 9 U/L (ref 5–33)
ANION GAP SERPL CALCULATED.3IONS-SCNC: 10 MMOL/L (ref 9–17)
AST SERPL-CCNC: 13 U/L
B-HCG SERPL EIA 3RD IS-ACNC: <1 MIU/ML
BASOPHILS # BLD: 0.04 K/UL (ref 0–0.2)
BASOPHILS NFR BLD: 0 % (ref 0–2)
BILIRUB SERPL-MCNC: <0.1 MG/DL (ref 0.3–1.2)
BILIRUB UR QL STRIP: NEGATIVE
BUN SERPL-MCNC: 12 MG/DL (ref 6–20)
CALCIUM SERPL-MCNC: 9 MG/DL (ref 8.6–10.4)
CASTS #/AREA URNS LPF: ABNORMAL /LPF (ref 0–8)
CHLORIDE SERPL-SCNC: 104 MMOL/L (ref 98–107)
CLARITY UR: ABNORMAL
CO2 SERPL-SCNC: 23 MMOL/L (ref 20–31)
COLOR UR: YELLOW
CREAT SERPL-MCNC: 0.5 MG/DL (ref 0.5–0.9)
EOSINOPHIL # BLD: 0.13 K/UL (ref 0–0.44)
EOSINOPHILS RELATIVE PERCENT: 1 % (ref 1–4)
EPI CELLS #/AREA URNS HPF: ABNORMAL /HPF (ref 0–5)
ERYTHROCYTE [DISTWIDTH] IN BLOOD BY AUTOMATED COUNT: 15.1 % (ref 11.8–14.4)
GFR SERPL CREATININE-BSD FRML MDRD: >60 ML/MIN/1.73M2
GLUCOSE SERPL-MCNC: 100 MG/DL (ref 70–99)
GLUCOSE UR STRIP-MCNC: NEGATIVE MG/DL
HCT VFR BLD AUTO: 35.6 % (ref 36.3–47.1)
HGB BLD-MCNC: 11 G/DL (ref 11.9–15.1)
HGB UR QL STRIP.AUTO: ABNORMAL
IMM GRANULOCYTES # BLD AUTO: 0.04 K/UL (ref 0–0.3)
IMM GRANULOCYTES NFR BLD: 0 %
KETONES UR STRIP-MCNC: NEGATIVE MG/DL
LEUKOCYTE ESTERASE UR QL STRIP: ABNORMAL
LIPASE SERPL-CCNC: 17 U/L (ref 13–60)
LYMPHOCYTES NFR BLD: 3.59 K/UL (ref 1.1–3.7)
LYMPHOCYTES RELATIVE PERCENT: 34 % (ref 25–45)
MCH RBC QN AUTO: 25.1 PG (ref 25.2–33.5)
MCHC RBC AUTO-ENTMCNC: 30.9 G/DL (ref 28.4–34.8)
MCV RBC AUTO: 81.1 FL (ref 82.6–102.9)
MICROORGANISM SPEC CULT: ABNORMAL
MICROORGANISM SPEC CULT: ABNORMAL
MONOCYTES NFR BLD: 0.73 K/UL (ref 0.1–1.4)
MONOCYTES NFR BLD: 7 % (ref 2–8)
NEUTROPHILS NFR BLD: 58 % (ref 34–64)
NEUTS SEG NFR BLD: 6.17 K/UL (ref 1.5–8.1)
NITRITE UR QL STRIP: NEGATIVE
NRBC BLD-RTO: 0 PER 100 WBC
PH UR STRIP: 6.5 [PH] (ref 5–8)
PLATELET # BLD AUTO: 271 K/UL (ref 138–453)
PMV BLD AUTO: 10.2 FL (ref 8.1–13.5)
POTASSIUM SERPL-SCNC: 4 MMOL/L (ref 3.7–5.3)
PROT SERPL-MCNC: 6.9 G/DL (ref 6.4–8.3)
PROT UR STRIP-MCNC: ABNORMAL MG/DL
RBC # BLD AUTO: 4.39 M/UL (ref 3.95–5.11)
RBC # BLD: ABNORMAL 10*6/UL
RBC #/AREA URNS HPF: ABNORMAL /HPF (ref 0–4)
SODIUM SERPL-SCNC: 137 MMOL/L (ref 135–144)
SP GR UR STRIP: 1.02 (ref 1–1.03)
SPECIMEN DESCRIPTION: ABNORMAL
UROBILINOGEN UR STRIP-ACNC: NORMAL EU/DL (ref 0–1)
WBC #/AREA URNS HPF: ABNORMAL /HPF (ref 0–5)
WBC OTHER # BLD: 10.7 K/UL (ref 4.5–13.5)

## 2023-10-15 PROCEDURE — 99284 EMERGENCY DEPT VISIT MOD MDM: CPT

## 2023-10-15 PROCEDURE — 81001 URINALYSIS AUTO W/SCOPE: CPT

## 2023-10-15 PROCEDURE — 80053 COMPREHEN METABOLIC PANEL: CPT

## 2023-10-15 PROCEDURE — 87086 URINE CULTURE/COLONY COUNT: CPT

## 2023-10-15 PROCEDURE — 2580000003 HC RX 258: Performed by: STUDENT IN AN ORGANIZED HEALTH CARE EDUCATION/TRAINING PROGRAM

## 2023-10-15 PROCEDURE — 83690 ASSAY OF LIPASE: CPT

## 2023-10-15 PROCEDURE — 6370000000 HC RX 637 (ALT 250 FOR IP): Performed by: STUDENT IN AN ORGANIZED HEALTH CARE EDUCATION/TRAINING PROGRAM

## 2023-10-15 PROCEDURE — 87186 SC STD MICRODIL/AGAR DIL: CPT

## 2023-10-15 PROCEDURE — 87077 CULTURE AEROBIC IDENTIFY: CPT

## 2023-10-15 PROCEDURE — 85025 COMPLETE CBC W/AUTO DIFF WBC: CPT

## 2023-10-15 PROCEDURE — 84702 CHORIONIC GONADOTROPIN TEST: CPT

## 2023-10-15 RX ORDER — CEPHALEXIN 500 MG/1
500 CAPSULE ORAL 2 TIMES DAILY
Qty: 14 CAPSULE | Refills: 0 | Status: SHIPPED | OUTPATIENT
Start: 2023-10-15 | End: 2023-10-22

## 2023-10-15 RX ORDER — 0.9 % SODIUM CHLORIDE 0.9 %
1000 INTRAVENOUS SOLUTION INTRAVENOUS ONCE
Status: COMPLETED | OUTPATIENT
Start: 2023-10-15 | End: 2023-10-15

## 2023-10-15 RX ORDER — CEPHALEXIN 500 MG/1
500 CAPSULE ORAL ONCE
Status: COMPLETED | OUTPATIENT
Start: 2023-10-15 | End: 2023-10-15

## 2023-10-15 RX ADMIN — CEPHALEXIN 500 MG: 500 CAPSULE ORAL at 04:32

## 2023-10-15 RX ADMIN — SODIUM CHLORIDE 1000 ML: 9 INJECTION, SOLUTION INTRAVENOUS at 02:37

## 2023-10-15 NOTE — DISCHARGE INSTRUCTIONS
Please call your primary care provider for follow up in the next few days. Take your medication as indicated and prescribed. If you are given an antibiotic then, make sure you get the prescription filled and take the antibiotics until finished. Drink plenty of water while taking the antibiotics. Avoid drinking alcohol or drinks that have caffeine in it while taking antibiotics. For pain use acetaminophen (Tylenol) or ibuprofen (Motrin / Advil), unless prescribed medications that have acetaminophen or ibuprofen (or similar medications) in it. You can take over the counter acetaminophen tablets (1 - 2 tablets of the 500-mg strength every 6 hours) or ibuprofen tablets (2 tablets every 4 hours). PLEASE RETURN TO THE EMERGENCY DEPARTMENT IMMEDIATELY for worsening symptoms, inability to urinate, worsening of blood in your urine, or if you develop any concerning symptoms such as: high fever not relieved by acetaminophen (Tylenol) and/or ibuprofen (Motrin / Advil), chills, shortness of breath, chest pain, feeling of your heart fluttering or racing, persistent nausea and/or vomiting, vomiting up blood, blood in your stool, loss of consciousness, numbness, weakness or tingling in the arms or legs or change in color of the extremities, changes in mental status, persistent headache, blurry vision, loss of bladder / bowel.

## 2023-10-15 NOTE — ED NOTES
Pt ambulatory to bathroom with steady gait. Urine sample obtained. Pt requesting something to eat. Resident aware. Awaiting further orders.       Radha Molina RN  10/15/23 5376

## 2023-10-15 NOTE — ED NOTES
Pt with c/o epigastric abdominal pain, vaginal bleeding and passing clots. Pt reports she was seen on Friday for same complaint. Pt reports \"I was rushed here in an ambulance, and they gave me morphine when I was here. \" Pt reports she has not had any vaginal bleeding since leaving the ED on Friday until approx 5 minutes before coming to ED tonight. Dr. Rogelio Salazar at bedside to evaluate pt. IV initiated and labs obtained. Pt unable to provide urine sample at this time. Call light within reach. Pt given warm blanket for comfort. Will cont to monitor.       Denis Briggs RN  10/15/23 5444

## 2023-10-16 LAB
C TRACH DNA SPEC QL PROBE+SIG AMP: NEGATIVE
MICROORGANISM SPEC CULT: ABNORMAL
N GONORRHOEA DNA SPEC QL PROBE+SIG AMP: NEGATIVE
SPECIMEN DESCRIPTION: ABNORMAL
SPECIMEN DESCRIPTION: NORMAL

## 2023-10-17 ASSESSMENT — ENCOUNTER SYMPTOMS
SHORTNESS OF BREATH: 0
VOMITING: 0
DIARRHEA: 0
NAUSEA: 0
ABDOMINAL PAIN: 1

## 2023-10-17 NOTE — PROGRESS NOTES
Reviewed patient's urine culture - culture positive for Ecoli. Patient was discharged on cephalexin, and culture is sensitive to prescribed medication. Antibiotic prescribed at discharge is appropriate - no changes made to antibiotic regimen. Lacy Henson D.

## 2024-01-04 ENCOUNTER — NURSE ONLY (OUTPATIENT)
Dept: OBGYN | Age: 24
End: 2024-01-04
Payer: COMMERCIAL

## 2024-01-04 DIAGNOSIS — N94.6 DYSMENORRHEA: Primary | ICD-10-CM

## 2024-01-04 PROCEDURE — 96372 THER/PROPH/DIAG INJ SC/IM: CPT | Performed by: OBSTETRICS & GYNECOLOGY

## 2024-01-04 RX ORDER — MEDROXYPROGESTERONE ACETATE 150 MG/ML
150 INJECTION, SUSPENSION INTRAMUSCULAR
Qty: 1 ML | Refills: 3 | Status: SHIPPED | OUTPATIENT
Start: 2024-01-04

## 2024-01-04 RX ORDER — MEDROXYPROGESTERONE ACETATE 150 MG/ML
150 INJECTION, SUSPENSION INTRAMUSCULAR ONCE
Status: COMPLETED | OUTPATIENT
Start: 2024-01-04 | End: 2024-01-04

## 2024-01-04 RX ADMIN — MEDROXYPROGESTERONE ACETATE 150 MG: 150 INJECTION, SUSPENSION INTRAMUSCULAR at 11:00

## 2024-01-04 NOTE — PROGRESS NOTES
Patient was given Depo-Provera in the Left Vastis Lateralis . Per doctor Maddie  NDC# 77629-354-16  LOT# [id number]  Exp date- 08/01/2025  Patient tolerated well without difficulty

## 2024-01-21 ENCOUNTER — HOSPITAL ENCOUNTER (EMERGENCY)
Age: 24
Discharge: HOME | End: 2024-01-21
Attending: EMERGENCY MEDICINE
Payer: COMMERCIAL

## 2024-01-21 VITALS
BODY MASS INDEX: 32.62 KG/M2 | RESPIRATION RATE: 20 BRPM | TEMPERATURE: 97.8 F | WEIGHT: 178.35 LBS | DIASTOLIC BLOOD PRESSURE: 87 MMHG | HEART RATE: 73 BPM | OXYGEN SATURATION: 99 % | SYSTOLIC BLOOD PRESSURE: 131 MMHG

## 2024-01-21 DIAGNOSIS — B96.89 BACTERIAL VAGINOSIS: Primary | ICD-10-CM

## 2024-01-21 DIAGNOSIS — N76.0 BACTERIAL VAGINOSIS: Primary | ICD-10-CM

## 2024-01-21 LAB
ALBUMIN SERPL-MCNC: 4.6 G/DL (ref 3.5–5.2)
ALBUMIN/GLOB SERPL: 1 {RATIO} (ref 1–2.5)
ALP SERPL-CCNC: 82 U/L (ref 35–104)
ALT SERPL-CCNC: 8 U/L (ref 10–35)
ANION GAP SERPL CALCULATED.3IONS-SCNC: 13 MMOL/L (ref 9–16)
AST SERPL-CCNC: 22 U/L (ref 10–35)
BACTERIA URNS QL MICRO: NORMAL
BASOPHILS # BLD: 0.03 K/UL (ref 0–0.2)
BASOPHILS NFR BLD: 0 % (ref 0–2)
BILIRUB SERPL-MCNC: 0.2 MG/DL (ref 0–1.2)
BILIRUB UR QL STRIP: NEGATIVE
BUN SERPL-MCNC: 11 MG/DL (ref 6–20)
CALCIUM SERPL-MCNC: 9.7 MG/DL (ref 8.6–10.4)
CANDIDA SPECIES: NEGATIVE
CASTS #/AREA URNS LPF: NORMAL /LPF (ref 0–8)
CHLORIDE SERPL-SCNC: 105 MMOL/L (ref 98–107)
CLARITY UR: CLEAR
CO2 SERPL-SCNC: 21 MMOL/L (ref 20–31)
COLOR UR: YELLOW
CREAT SERPL-MCNC: 0.7 MG/DL (ref 0.5–0.9)
EOSINOPHIL # BLD: 0.09 K/UL (ref 0–0.44)
EOSINOPHILS RELATIVE PERCENT: 1 % (ref 1–4)
EPI CELLS #/AREA URNS HPF: NORMAL /HPF (ref 0–5)
ERYTHROCYTE [DISTWIDTH] IN BLOOD BY AUTOMATED COUNT: 15.8 % (ref 11.8–14.4)
GARDNERELLA VAGINALIS: POSITIVE
GFR SERPL CREATININE-BSD FRML MDRD: >60 ML/MIN/1.73M2
GLUCOSE SERPL-MCNC: 103 MG/DL (ref 74–99)
GLUCOSE UR STRIP-MCNC: NEGATIVE MG/DL
HCG SERPL QL: NEGATIVE
HCT VFR BLD AUTO: 42.4 % (ref 36.3–47.1)
HGB BLD-MCNC: 13.3 G/DL (ref 11.9–15.1)
HGB UR QL STRIP.AUTO: NEGATIVE
IMM GRANULOCYTES # BLD AUTO: 0.07 K/UL (ref 0–0.3)
IMM GRANULOCYTES NFR BLD: 1 %
KETONES UR STRIP-MCNC: NEGATIVE MG/DL
LEUKOCYTE ESTERASE UR QL STRIP: ABNORMAL
LIPASE SERPL-CCNC: 18 U/L (ref 13–60)
LYMPHOCYTES NFR BLD: 1.91 K/UL (ref 1.1–3.7)
LYMPHOCYTES RELATIVE PERCENT: 17 % (ref 25–45)
MCH RBC QN AUTO: 24.8 PG (ref 25.2–33.5)
MCHC RBC AUTO-ENTMCNC: 31.4 G/DL (ref 28.4–34.8)
MCV RBC AUTO: 79 FL (ref 82.6–102.9)
MONOCYTES NFR BLD: 0.54 K/UL (ref 0.1–1.4)
MONOCYTES NFR BLD: 5 % (ref 2–8)
NEUTROPHILS NFR BLD: 76 % (ref 34–64)
NEUTS SEG NFR BLD: 8.66 K/UL (ref 1.5–8.1)
NITRITE UR QL STRIP: NEGATIVE
NRBC BLD-RTO: 0 PER 100 WBC
PH UR STRIP: 7 [PH] (ref 5–8)
PLATELET # BLD AUTO: 321 K/UL (ref 138–453)
PMV BLD AUTO: 9.6 FL (ref 8.1–13.5)
POTASSIUM SERPL-SCNC: 3.9 MMOL/L (ref 3.7–5.3)
PROT SERPL-MCNC: 8.4 G/DL (ref 6.6–8.7)
PROT UR STRIP-MCNC: NEGATIVE MG/DL
RBC # BLD AUTO: 5.37 M/UL (ref 3.95–5.11)
RBC # BLD: ABNORMAL 10*6/UL
RBC #/AREA URNS HPF: NORMAL /HPF (ref 0–4)
SODIUM SERPL-SCNC: 139 MMOL/L (ref 136–145)
SOURCE: ABNORMAL
SP GR UR STRIP: 1.02 (ref 1–1.03)
TRICHOMONAS: NEGATIVE
UROBILINOGEN UR STRIP-ACNC: NORMAL EU/DL (ref 0–1)
WBC #/AREA URNS HPF: NORMAL /HPF (ref 0–5)
WBC OTHER # BLD: 11.3 K/UL (ref 4.5–13.5)

## 2024-01-21 PROCEDURE — 87510 GARDNER VAG DNA DIR PROBE: CPT

## 2024-01-21 PROCEDURE — 87491 CHLMYD TRACH DNA AMP PROBE: CPT

## 2024-01-21 PROCEDURE — 83690 ASSAY OF LIPASE: CPT

## 2024-01-21 PROCEDURE — 6370000000 HC RX 637 (ALT 250 FOR IP): Performed by: STUDENT IN AN ORGANIZED HEALTH CARE EDUCATION/TRAINING PROGRAM

## 2024-01-21 PROCEDURE — 87480 CANDIDA DNA DIR PROBE: CPT

## 2024-01-21 PROCEDURE — 80053 COMPREHEN METABOLIC PANEL: CPT

## 2024-01-21 PROCEDURE — 87086 URINE CULTURE/COLONY COUNT: CPT

## 2024-01-21 PROCEDURE — 99283 EMERGENCY DEPT VISIT LOW MDM: CPT

## 2024-01-21 PROCEDURE — 85025 COMPLETE CBC W/AUTO DIFF WBC: CPT

## 2024-01-21 PROCEDURE — 87660 TRICHOMONAS VAGIN DIR PROBE: CPT

## 2024-01-21 PROCEDURE — 87591 N.GONORRHOEAE DNA AMP PROB: CPT

## 2024-01-21 PROCEDURE — 84703 CHORIONIC GONADOTROPIN ASSAY: CPT

## 2024-01-21 PROCEDURE — 81001 URINALYSIS AUTO W/SCOPE: CPT

## 2024-01-21 RX ORDER — METRONIDAZOLE 500 MG/1
500 TABLET ORAL ONCE
Status: COMPLETED | OUTPATIENT
Start: 2024-01-21 | End: 2024-01-21

## 2024-01-21 RX ORDER — ACETAMINOPHEN 500 MG
1000 TABLET ORAL ONCE
Status: COMPLETED | OUTPATIENT
Start: 2024-01-21 | End: 2024-01-21

## 2024-01-21 RX ORDER — METRONIDAZOLE 500 MG/1
500 TABLET ORAL 2 TIMES DAILY
Qty: 20 TABLET | Refills: 0 | Status: SHIPPED | OUTPATIENT
Start: 2024-01-21 | End: 2024-01-31

## 2024-01-21 RX ADMIN — ACETAMINOPHEN 1000 MG: 500 TABLET ORAL at 11:10

## 2024-01-21 RX ADMIN — METRONIDAZOLE 500 MG: 500 TABLET, FILM COATED ORAL at 13:23

## 2024-01-21 NOTE — ED PROVIDER NOTES
[facility] ED  Emergency Department Encounter  Emergency Medicine Resident     Pt Name:Hodan Renteria  MRN: [id number]  Birthdate 2000  Date of evaluation: 1/21/24  PCP:  Lucie Whitman APRN - CNP  Note Started: 10:34 AM EST      CHIEF COMPLAINT       Chief Complaint   Patient presents with    Abdominal Cramping     States on depo shot but having abdominal cramping and flutter in abdomen     Pregnancy Test       HISTORY OF PRESENT ILLNESS  (Location/Symptom, Timing/Onset, Context/Setting, Quality, Duration, Modifying Factors, Severity.)      Hodan Renteria is a 23 y.o. female who presents with pelvic cramping.  Patient states this has been ongoing for the past week.  Has associated nausea without vomiting.  No dysuria or hematuria.  No constipation or diarrhea.  Denies vaginal discharge or bleeding.  Patient is sexually active.  Is on Depo shot.  Last menstrual period prior to 1/4/2024.  Denies fever/chills or recent illnesses.  Denies back pain.    PAST MEDICAL / SURGICAL / SOCIAL / FAMILY HISTORY      has a past medical history of BV (bacterial vaginosis), Chlamydia, Chlamydia infection, Chlamydia infection affecting pregnancy, cHTN (no meds), Closed fracture of phalanx of middle finger, Gonorrhea, History of blood transfusion, Seasonal allergies, Strep throat, Trichomonas infection, and Trichomonas vaginitis.     has no past surgical history on file.    Social History     Socioeconomic History    Marital status: Single     Spouse name: Not on file    Number of children: Not on file    Years of education: Not on file    Highest education level: Not on file   Occupational History    Not on file   Tobacco Use    Smoking status: Never     Passive exposure: Yes    Smokeless tobacco: Never   Vaping Use    Vaping Use: Never used    Passive vaping exposure: Yes   Substance and Sexual Activity    Alcohol use: Not Currently     Comment: Not during pregnancy (last drink summer 2021)    Drug use:

## 2024-01-21 NOTE — ED NOTES
The following labs were labeled with appropriate pt sticker and tubed to lab:     [x] Blue     [x] Lavender   [] on ice  [x] Green/yellow  [x] Green/black [] on ice  [] Grey  [] on ice  [] Yellow  [x] Red  [] Pink  [] Type/ Screen  [] ABG  [] VBG    [] COVID-19 swab    [] Rapid  [] PCR  [] Flu swab  [] Peds Viral Panel     [x] Urine Sample  [] Fecal Sample  [] Pelvic Cultures  [] Blood Cultures  [] X 2  [] STREP Cultures  [] Wound Cultures

## 2024-01-21 NOTE — ED PROVIDER NOTES
[facility] ED  eMERGENCY dEPARTMENT eNCOUnter   Attending Attestation     Pt Name: Hodan Renteria  MRN: [id number]  Birthdate 2000  Date of evaluation: 1/21/24       Hodan Renteria is a 23 y.o. female who presents with Abdominal Cramping (States on depo shot but having abdominal cramping and flutter in abdomen ) and Pregnancy Test      12:36 PM EST        I performed a history and physical examination of the patient and discussed management with the resident. I reviewed the resident’s note and agree with the documented findings and plan of care. Any areas of disagreement are noted on the chart. I was personally present for the key portions of any procedures. I have documented in the chart those procedures where I was not present during the key portions. I have personally reviewed all images and agree with the resident's interpretation. I have reviewed the emergency nurses triage note. I agree with the chief complaint, past medical history, past surgical history, allergies, medications, social and family history as documented unless otherwise noted below. Documentation of the HPI, Physical Exam and Medical Decision Making performed by medical students or scribes is based on my personal performance of the HPI, PE and MDM. For Phys Assistant/ Nurse Practitioner cases/documentation I have had a face to face evaluation of this patient and have completed at least one if not all key elements of the E/M (history, physical exam, and MDM). Additional findings are as noted.    For APC cases I have personally evaluated and examined the patient in conjunction with the APC and agree with the treatment plan and disposition of the patient as recorded by the APC.    Tarun Ojeda MD  Attending Emergency  Physician       Tarun Ojeda MD  01/21/24 9408

## 2024-01-21 NOTE — ED NOTES
Patient presents to ED for evaluation of pelvic discomfort. Patient reports pelvic pain x1 week. Patient is on depo shot with first dose in 2023 and second dose at the beginning of this month. Patient reports vaginal bleeding from time of first shot that stopped right before second, none since. Patient denies any abnormal vaginal discharge, nausea, vomiting, diarrhea. Patient concerned for pregnancy. Patient is  with one ectopic pregnancy and the rest were miscarriages early in pregnancy.

## 2024-01-21 NOTE — DISCHARGE INSTRUCTIONS
You were evaluated in the emergency department for vaginal itching and abdominal pain.  Your lab work did not reveal any acute abnormalities.  You had a negative pregnancy test.  You have bacterial vaginosis.  You were given Flagyl in the emergency department.  You are given a prescription for Flagyl.  This is an antibiotic.  Please take this medication as prescribed.  Please finish the entire course of antibiotics even if you start to feel better.  Please return to the emergency department for any worsening symptoms, questions or concerns.

## 2024-01-22 LAB
C TRACH DNA SPEC QL PROBE+SIG AMP: NEGATIVE
MICROORGANISM SPEC CULT: NORMAL
N GONORRHOEA DNA SPEC QL PROBE+SIG AMP: NEGATIVE
SPECIMEN DESCRIPTION: NORMAL
SPECIMEN DESCRIPTION: NORMAL

## 2024-02-12 ENCOUNTER — APPOINTMENT (OUTPATIENT)
Dept: ULTRASOUND IMAGING | Age: 24
End: 2024-02-12
Payer: COMMERCIAL

## 2024-02-12 ENCOUNTER — HOSPITAL ENCOUNTER (EMERGENCY)
Age: 24
Discharge: HOME | End: 2024-02-12
Attending: EMERGENCY MEDICINE
Payer: COMMERCIAL

## 2024-02-12 VITALS
TEMPERATURE: 99.1 F | WEIGHT: 178.79 LBS | BODY MASS INDEX: 31.68 KG/M2 | RESPIRATION RATE: 18 BRPM | HEIGHT: 63 IN | HEART RATE: 90 BPM | SYSTOLIC BLOOD PRESSURE: 127 MMHG | OXYGEN SATURATION: 97 % | DIASTOLIC BLOOD PRESSURE: 80 MMHG

## 2024-02-12 DIAGNOSIS — N93.9 VAGINAL BLEEDING: Primary | ICD-10-CM

## 2024-02-12 LAB
BACTERIA URNS QL MICRO: NORMAL
BASOPHILS # BLD: 0.04 K/UL (ref 0–0.2)
BASOPHILS NFR BLD: 1 % (ref 0–2)
BILIRUB UR QL STRIP: NEGATIVE
CANDIDA SPECIES: NEGATIVE
CASTS #/AREA URNS LPF: NORMAL /LPF (ref 0–8)
CLARITY UR: CLEAR
COLOR UR: YELLOW
EOSINOPHIL # BLD: 0.11 K/UL (ref 0–0.44)
EOSINOPHILS RELATIVE PERCENT: 1 % (ref 1–4)
EPI CELLS #/AREA URNS HPF: NORMAL /HPF (ref 0–5)
ERYTHROCYTE [DISTWIDTH] IN BLOOD BY AUTOMATED COUNT: 17.6 % (ref 11.8–14.4)
GARDNERELLA VAGINALIS: NEGATIVE
GLUCOSE UR STRIP-MCNC: NEGATIVE MG/DL
HCG SERPL QL: NEGATIVE
HCT VFR BLD AUTO: 40.6 % (ref 36.3–47.1)
HGB BLD-MCNC: 12.9 G/DL (ref 11.9–15.1)
HGB UR QL STRIP.AUTO: ABNORMAL
IMM GRANULOCYTES # BLD AUTO: 0.04 K/UL (ref 0–0.3)
IMM GRANULOCYTES NFR BLD: 1 %
KETONES UR STRIP-MCNC: NEGATIVE MG/DL
LEUKOCYTE ESTERASE UR QL STRIP: NEGATIVE
LYMPHOCYTES NFR BLD: 3.15 K/UL (ref 1.1–3.7)
LYMPHOCYTES RELATIVE PERCENT: 38 % (ref 25–45)
MCH RBC QN AUTO: 25.5 PG (ref 25.2–33.5)
MCHC RBC AUTO-ENTMCNC: 31.8 G/DL (ref 28.4–34.8)
MCV RBC AUTO: 80.2 FL (ref 82.6–102.9)
MONOCYTES NFR BLD: 0.59 K/UL (ref 0.1–1.4)
MONOCYTES NFR BLD: 7 % (ref 2–8)
NEUTROPHILS NFR BLD: 52 % (ref 34–64)
NEUTS SEG NFR BLD: 4.46 K/UL (ref 1.5–8.1)
NITRITE UR QL STRIP: NEGATIVE
NRBC BLD-RTO: 0 PER 100 WBC
PH UR STRIP: 6 [PH] (ref 5–8)
PLATELET # BLD AUTO: 289 K/UL (ref 138–453)
PMV BLD AUTO: 10.4 FL (ref 8.1–13.5)
PROT UR STRIP-MCNC: NEGATIVE MG/DL
RBC # BLD AUTO: 5.06 M/UL (ref 3.95–5.11)
RBC # BLD: ABNORMAL 10*6/UL
RBC #/AREA URNS HPF: NORMAL /HPF (ref 0–4)
SOURCE: NORMAL
SP GR UR STRIP: 1.02 (ref 1–1.03)
TRICHOMONAS: NEGATIVE
UROBILINOGEN UR STRIP-ACNC: NORMAL EU/DL (ref 0–1)
WBC #/AREA URNS HPF: NORMAL /HPF (ref 0–5)
WBC OTHER # BLD: 8.4 K/UL (ref 4.5–13.5)

## 2024-02-12 PROCEDURE — 96374 THER/PROPH/DIAG INJ IV PUSH: CPT

## 2024-02-12 PROCEDURE — 81001 URINALYSIS AUTO W/SCOPE: CPT

## 2024-02-12 PROCEDURE — 87660 TRICHOMONAS VAGIN DIR PROBE: CPT

## 2024-02-12 PROCEDURE — 99284 EMERGENCY DEPT VISIT MOD MDM: CPT

## 2024-02-12 PROCEDURE — 87491 CHLMYD TRACH DNA AMP PROBE: CPT

## 2024-02-12 PROCEDURE — 85025 COMPLETE CBC W/AUTO DIFF WBC: CPT

## 2024-02-12 PROCEDURE — 93975 VASCULAR STUDY: CPT

## 2024-02-12 PROCEDURE — 76830 TRANSVAGINAL US NON-OB: CPT

## 2024-02-12 PROCEDURE — 6360000002 HC RX W HCPCS: Performed by: EMERGENCY MEDICINE

## 2024-02-12 PROCEDURE — 84703 CHORIONIC GONADOTROPIN ASSAY: CPT

## 2024-02-12 PROCEDURE — 87510 GARDNER VAG DNA DIR PROBE: CPT

## 2024-02-12 PROCEDURE — 87480 CANDIDA DNA DIR PROBE: CPT

## 2024-02-12 PROCEDURE — 87591 N.GONORRHOEAE DNA AMP PROB: CPT

## 2024-02-12 RX ORDER — ONDANSETRON 2 MG/ML
4 INJECTION INTRAMUSCULAR; INTRAVENOUS ONCE
Status: COMPLETED | OUTPATIENT
Start: 2024-02-12 | End: 2024-02-12

## 2024-02-12 RX ADMIN — ONDANSETRON 4 MG: 2 INJECTION INTRAMUSCULAR; INTRAVENOUS at 16:41

## 2024-02-12 NOTE — ED NOTES
The following labs were labeled with appropriate pt sticker and tubed to lab:     [x] Blue     [x] Lavender   [] on ice  [x] Green/yellow  [x] Green/black [] on ice  [] [place]  [] on ice  [x] Yellow  [] Red  [] Pink  [] Type/ Screen  [] ABG  [] VBG    [] COVID-19 swab    [] Rapid  [] PCR  [] Flu swab  [] Peds Viral Panel     [] Urine Sample  [] Fecal Sample  [] Pelvic Cultures  [] Blood Cultures  [] X 2  [] STREP Cultures  [] Wound Cultures

## 2024-02-12 NOTE — ED NOTES
Pt presents to ED via walking through triage c/o vaginal bleeding since today. Pt also reports pelvic pain x2 days. Pt denies soaking pads, but states, \"every time I wipe, there is blood.\" Pt reports last menses was 6 months ago. Pt denies passing any clots. Pt denies any pain upon urination. Pt denies any vaginal discharge. Pt reports receiving depo shot as birth control. Pt rates pelvic pain 8/10 and denies taking any medication PTA.  RR even and non labored.  NAD noted.  Call light within reach.

## 2024-02-12 NOTE — ED NOTES
The following labs were labeled with appropriate pt sticker and tubed to lab:     [] Blue     [] Lavender   [] on ice  [] Green/yellow  [] Green/black [] on ice  [] [place]  [] on ice  [x] Yellow  [] Red  [] Pink  [] Type/ Screen  [] ABG  [] VBG    [] COVID-19 swab    [] Rapid  [] PCR  [] Flu swab  [] Peds Viral Panel     [] Urine Sample  [] Fecal Sample  [] Pelvic Cultures  [] Blood Cultures  [] X 2  [] STREP Cultures  [] Wound Cultures

## 2024-02-12 NOTE — ED NOTES
Pt ambulating to restroom with steady and even gait. Pt provided with specimen cup for urine sample. Specimen cup labeled with pt identifier sticker.

## 2024-02-13 NOTE — ED PROVIDER NOTES
is uncomfortable pain score 6/10 in intensity, vital signs are normal.  She has significant right lower quadrant tenderness with rebound and guarding.  Impression is ectopic pregnancy, consider pregnancy with corpus luteal cyst, simple ovarian cyst, less likely ovarian torsion, unlikely appendicitis, rule out UTI.  Plan is IV access, analgesics, antiemetics, serum hCG, quantitative hCG if positive, pelvic ultrasound, urinalysis, anticipate gynecology involvement.            Delfino Neumann MD, FACEP  Attending Emergency  Physician                Delfino Neumann MD  02/12/24 3898    
BMI 31.67 kg/m²     Physical Exam  Exam conducted with a chaperone present.   Constitutional:       General: She is not in acute distress.  HENT:      Head: Normocephalic and atraumatic.   Cardiovascular:      Rate and Rhythm: Normal rate and regular rhythm.   Pulmonary:      Effort: Pulmonary effort is normal.      Breath sounds: Normal breath sounds.   Abdominal:      General: Abdomen is flat.      Tenderness: There is abdominal tenderness.   Genitourinary:     Pubic Area: No rash.       Labia:         Right: No rash.         Left: No rash.       Vagina: No vaginal discharge or tenderness.      Cervix: Cervical bleeding present. No discharge.      Comments: Serosanguineous drainage from the cervix  Neurological:      Mental Status: She is alert.           DDX/DIAGNOSTIC RESULTS / EMERGENCY DEPARTMENT COURSE / MDM     Medical Decision Making  23-year-old female with no past medical history, otherwise healthy presenting to the ED with 2 weeks of pelvic pressure as well as 1 day of vaginal bleeding.  Patient reports last menstrual period was 6 months ago and reports she is sexually active however taking Depo shot for birth control.  Denies any vaginal discharge or difficulty with urination.  Patient is diffusely tender on ana m exam as well as during pelvic exam.  Plan on CBC, UA DNA vaginitis swabs, gonorrhea/chlamydia, ultrasound to assess for torsion and free fluid.  Workup negative.  Advised patient on follow-up on MyChart results for gonorrhea and chlamydia results.  Patient reports she does have an OB/GYN and advised scheduling follow-up appointment with OB/GYN as well as PCP and advised on return precautions.  Patient agreeable with plan.    Amount and/or Complexity of Data Reviewed  Labs: ordered. Decision-making details documented in ED Course.  Radiology: ordered. Decision-making details documented in ED Course.    Risk  Prescription drug management.          EMERGENCY DEPARTMENT COURSE:      ED Course as

## 2024-02-13 NOTE — ED NOTES
Report received from Idalia GIBBS. All questions addressed. Patient resting comfortably on stretcher at this time, in no acute distress. Respirations even and unlabored.

## 2024-02-13 NOTE — DISCHARGE INSTRUCTIONS
You were seen evaluated at [facility] on 2/12/2024 for pelvic pain and bleeding.  Your basic lab work including CBC, UA, vaginal swab were unremarkable.  The gonorrhea and Chlamydia tests do take up to 24 hours to result, not resulted at the time of your discharge.  Please follow-up with your MyChart online to see your results.  A transvaginal ultrasound was done to look at your ovaries as well as your uterus, there were no acute abnormalities found there.  Please follow-up with your OB/GYN by calling to schedule an appointment this week.  Please take Tylenol and Motrin as needed for pain.  Do not take over 4000 mg of Tylenol in a 24-hour time.  Or 3200 mg of Motrin in a 24-hour time.  Please return to the ED with any new or worsening symptoms including worsening pelvic pain, heavy vaginal bleeding, lightheadedness, dizziness, passing out or any other concerns.

## 2024-02-18 ENCOUNTER — HOSPITAL ENCOUNTER (EMERGENCY)
Age: 24
Discharge: HOME | End: 2024-02-18
Attending: EMERGENCY MEDICINE
Payer: COMMERCIAL

## 2024-02-18 ENCOUNTER — APPOINTMENT (OUTPATIENT)
Dept: GENERAL RADIOLOGY | Age: 24
End: 2024-02-18
Payer: COMMERCIAL

## 2024-02-18 VITALS
WEIGHT: 178 LBS | OXYGEN SATURATION: 98 % | TEMPERATURE: 100.1 F | HEART RATE: 105 BPM | RESPIRATION RATE: 16 BRPM | BODY MASS INDEX: 31.54 KG/M2 | SYSTOLIC BLOOD PRESSURE: 130 MMHG | DIASTOLIC BLOOD PRESSURE: 86 MMHG | HEIGHT: 63 IN

## 2024-02-18 DIAGNOSIS — J02.9 ACUTE PHARYNGITIS, UNSPECIFIED ETIOLOGY: Primary | ICD-10-CM

## 2024-02-18 LAB
FLUAV AG SPEC QL: NEGATIVE
FLUBV AG SPEC QL: NEGATIVE
HETEROPH AB BLD QL IA: NEGATIVE
SARS-COV-2 RDRP RESP QL NAA+PROBE: NOT DETECTED
SPECIMEN DESCRIPTION: NORMAL
SPECIMEN SOURCE: NORMAL
STREP A, MOLECULAR: NEGATIVE

## 2024-02-18 PROCEDURE — 87635 SARS-COV-2 COVID-19 AMP PRB: CPT

## 2024-02-18 PROCEDURE — 6360000002 HC RX W HCPCS

## 2024-02-18 PROCEDURE — 87804 INFLUENZA ASSAY W/OPTIC: CPT

## 2024-02-18 PROCEDURE — 99284 EMERGENCY DEPT VISIT MOD MDM: CPT

## 2024-02-18 PROCEDURE — 96372 THER/PROPH/DIAG INJ SC/IM: CPT

## 2024-02-18 PROCEDURE — 86308 HETEROPHILE ANTIBODY SCREEN: CPT

## 2024-02-18 PROCEDURE — 71045 X-RAY EXAM CHEST 1 VIEW: CPT

## 2024-02-18 PROCEDURE — 6370000000 HC RX 637 (ALT 250 FOR IP)

## 2024-02-18 PROCEDURE — 87651 STREP A DNA AMP PROBE: CPT

## 2024-02-18 RX ORDER — DEXAMETHASONE SODIUM PHOSPHATE 10 MG/ML
8 INJECTION, SOLUTION INTRAMUSCULAR; INTRAVENOUS ONCE
Status: COMPLETED | OUTPATIENT
Start: 2024-02-18 | End: 2024-02-18

## 2024-02-18 RX ORDER — IBUPROFEN 200 MG
600 TABLET ORAL EVERY 8 HOURS PRN
Qty: 30 TABLET | Refills: 0 | Status: SHIPPED | OUTPATIENT
Start: 2024-02-18 | End: 2024-02-25

## 2024-02-18 RX ORDER — KETOROLAC TROMETHAMINE 15 MG/ML
15 INJECTION, SOLUTION INTRAMUSCULAR; INTRAVENOUS ONCE
Status: DISCONTINUED | OUTPATIENT
Start: 2024-02-18 | End: 2024-02-18

## 2024-02-18 RX ORDER — DEXAMETHASONE SODIUM PHOSPHATE 4 MG/ML
4 INJECTION, SOLUTION INTRA-ARTICULAR; INTRALESIONAL; INTRAMUSCULAR; INTRAVENOUS; SOFT TISSUE ONCE
Status: DISCONTINUED | OUTPATIENT
Start: 2024-02-18 | End: 2024-02-18

## 2024-02-18 RX ORDER — BENZONATATE 200 MG/1
200 CAPSULE ORAL 3 TIMES DAILY PRN
Qty: 15 CAPSULE | Refills: 0 | Status: SHIPPED | OUTPATIENT
Start: 2024-02-18 | End: 2024-02-23

## 2024-02-18 RX ORDER — BENZONATATE 100 MG/1
100 CAPSULE ORAL ONCE
Status: COMPLETED | OUTPATIENT
Start: 2024-02-18 | End: 2024-02-18

## 2024-02-18 RX ORDER — KETOROLAC TROMETHAMINE 15 MG/ML
15 INJECTION, SOLUTION INTRAMUSCULAR; INTRAVENOUS ONCE
Status: COMPLETED | OUTPATIENT
Start: 2024-02-18 | End: 2024-02-18

## 2024-02-18 RX ADMIN — KETOROLAC TROMETHAMINE 15 MG: 15 INJECTION, SOLUTION INTRAMUSCULAR; INTRAVENOUS at 17:07

## 2024-02-18 RX ADMIN — DEXAMETHASONE SODIUM PHOSPHATE 8 MG: 10 INJECTION, SOLUTION INTRAMUSCULAR; INTRAVENOUS at 17:08

## 2024-02-18 RX ADMIN — BENZONATATE 100 MG: 100 CAPSULE ORAL at 17:08

## 2024-02-18 ASSESSMENT — LIFESTYLE VARIABLES
HOW OFTEN DO YOU HAVE A DRINK CONTAINING ALCOHOL: NEVER
HOW MANY STANDARD DRINKS CONTAINING ALCOHOL DO YOU HAVE ON A TYPICAL DAY: PATIENT DOES NOT DRINK

## 2024-02-18 ASSESSMENT — PAIN SCALES - GENERAL
PAINLEVEL_OUTOF10: 8
PAINLEVEL_OUTOF10: 8

## 2024-02-18 ASSESSMENT — PAIN DESCRIPTION - LOCATION: LOCATION: THROAT

## 2024-02-18 ASSESSMENT — PAIN - FUNCTIONAL ASSESSMENT: PAIN_FUNCTIONAL_ASSESSMENT: 0-10

## 2024-02-18 NOTE — ED PROVIDER NOTES
[facility]     Emergency Department     Faculty Attestation    I performed a history and physical examination of the patient and discussed management with the resident. I reviewed the resident’s note and agree with the documented findings and plan of care. Any areas of disagreement are noted on the chart. I was personally present for the key portions of any procedures. I have documented in the chart those procedures where I was not present during the key portions. I have reviewed the emergency nurses triage note. I agree with the chief complaint, past medical history, past surgical history, allergies, medications, social and family history as documented unless otherwise noted below. Documentation of the HPI, Physical Exam and Medical Decision Making performed by medical students or scribes is based on my personal performance of the HPI, PE and MDM. For Physician Assistant/ Nurse Practitioner cases/documentation I have personally evaluated this patient and have completed at least one if not all key elements of the E/M (history, physical exam, and MDM). Additional findings are as noted.    Vital signs:   Vitals:    02/18/24 1622   BP: 130/86   Pulse:    Resp:    Temp:    SpO2:       Sore throat, cough, odynophagia, fever. Her throat is erythematous with 2+ swelling of the tonsils and a few small exudates. +adenopathy. Plan for strep, flu, covid. Oral steroid.             Deborah Blanco M.D,  Attending Emergency  Physician           Deborah Blanco MD  02/18/24 7038

## 2024-02-18 NOTE — DISCHARGE INSTRUCTIONS
You were seen for evaluation of sore throat, cough, shortness of breath.  Your testing emergency department was negative for COVID, flu, strep, mono.  You were given steroids in the emergency department which should help decrease inflammation in your throat over the next few days.  You will be given Tessalon to take at home to help with cough.  You can take Motrin or ibuprofen to help with bodyaches.  You also be given throat lozenges to help with sore throat.  You need to follow-up outpatient with a primary care physician in the next couple days for reevaluation.  Return to the emergency department for any worsening fevers, inability to eat or drink, chest pain or shortness of breath, loss of consciousness, other new or concerning symptoms

## 2024-02-18 NOTE — ED PROVIDER NOTES
[facility] ED  Emergency Department Encounter  Emergency Medicine Resident     Pt Name:Hodan Renteria  MRN: [id number]  Birthdate 2000  Date of evaluation: 2/18/24  PCP:  Lucie Whitman APRN - CNP  Note Started: 4:36 PM EST      CHIEF COMPLAINT       Chief Complaint   Patient presents with    Pharyngitis     Painful to swallow, since tuesday    Cough     Since tuesday       HISTORY OF PRESENT ILLNESS  (Location/Symptom, Timing/Onset, Context/Setting, Quality, Duration, Modifying Factors, Severity.)      Hodan Renteria is a 23 y.o. female who presents with sore throat, cough that began about 5 days ago.  Patient states her throat has become increasingly painful and is having difficulty swallowing secondary to pain.  Has associated headache, cough, body aches, fevers, chest pain with coughing and intermittent shortness of breath.  Has not taken any medications at home for symptoms.  No significant past medical history. No known ill contacts recently    PAST MEDICAL / SURGICAL / SOCIAL / FAMILY HISTORY      has a past medical history of BV (bacterial vaginosis), Chlamydia, Chlamydia infection, Chlamydia infection affecting pregnancy, cHTN (no meds), Closed fracture of phalanx of middle finger, Gonorrhea, History of blood transfusion, Seasonal allergies, Strep throat, Trichomonas infection, and Trichomonas vaginitis.       has no past surgical history on file.      Social History     Socioeconomic History    Marital status: Single     Spouse name: Not on file    Number of children: Not on file    Years of education: Not on file    Highest education level: Not on file   Occupational History    Not on file   Tobacco Use    Smoking status: Never     Passive exposure: Yes    Smokeless tobacco: Never   Vaping Use    Vaping Use: Never used    Passive vaping exposure: Yes   Substance and Sexual Activity    Alcohol use: Not Currently     Comment: Not during pregnancy (last drink summer 2021)    Drug

## 2024-06-25 ENCOUNTER — HOSPITAL ENCOUNTER (EMERGENCY)
Age: 24
Discharge: HOME | End: 2024-06-25
Attending: EMERGENCY MEDICINE
Payer: COMMERCIAL

## 2024-06-25 VITALS
RESPIRATION RATE: 18 BRPM | HEIGHT: 62 IN | OXYGEN SATURATION: 100 % | BODY MASS INDEX: 32.76 KG/M2 | TEMPERATURE: 98.3 F | HEART RATE: 90 BPM | WEIGHT: 178 LBS | SYSTOLIC BLOOD PRESSURE: 153 MMHG | DIASTOLIC BLOOD PRESSURE: 96 MMHG

## 2024-06-25 DIAGNOSIS — N92.1 METRORRHAGIA: Primary | ICD-10-CM

## 2024-06-25 DIAGNOSIS — N76.0 BACTERIAL VAGINOSIS: ICD-10-CM

## 2024-06-25 DIAGNOSIS — B96.89 BACTERIAL VAGINOSIS: ICD-10-CM

## 2024-06-25 LAB
ALBUMIN SERPL-MCNC: 4.2 G/DL (ref 3.5–5.2)
ALBUMIN/GLOB SERPL: 1 {RATIO} (ref 1–2.5)
ALP SERPL-CCNC: 79 U/L (ref 35–104)
ALT SERPL-CCNC: 14 U/L (ref 10–35)
ANION GAP SERPL CALCULATED.3IONS-SCNC: 9 MMOL/L (ref 9–16)
AST SERPL-CCNC: 27 U/L (ref 10–35)
BACTERIA URNS QL MICRO: NORMAL
BASOPHILS # BLD: 0.04 K/UL (ref 0–0.2)
BASOPHILS NFR BLD: 1 % (ref 0–2)
BILIRUB SERPL-MCNC: <0.2 MG/DL (ref 0–1.2)
BILIRUB UR QL STRIP: NEGATIVE
BUN SERPL-MCNC: 11 MG/DL (ref 6–20)
CALCIUM SERPL-MCNC: 9 MG/DL (ref 8.6–10.4)
CANDIDA SPECIES: NEGATIVE
CASTS #/AREA URNS LPF: NORMAL /LPF (ref 0–8)
CHLORIDE SERPL-SCNC: 104 MMOL/L (ref 98–107)
CLARITY UR: CLEAR
CO2 SERPL-SCNC: 25 MMOL/L (ref 20–31)
COLOR UR: YELLOW
CREAT SERPL-MCNC: 0.7 MG/DL (ref 0.5–0.9)
EOSINOPHIL # BLD: 0.18 K/UL (ref 0–0.44)
EOSINOPHILS RELATIVE PERCENT: 3 % (ref 1–4)
EPI CELLS #/AREA URNS HPF: NORMAL /HPF (ref 0–5)
ERYTHROCYTE [DISTWIDTH] IN BLOOD BY AUTOMATED COUNT: 16.8 % (ref 11.8–14.4)
GARDNERELLA VAGINALIS: POSITIVE
GFR, ESTIMATED: >90 ML/MIN/1.73M2
GLUCOSE SERPL-MCNC: 89 MG/DL (ref 74–99)
GLUCOSE UR STRIP-MCNC: NEGATIVE MG/DL
HCG SERPL QL: NEGATIVE
HCT VFR BLD AUTO: 35.9 % (ref 36.3–47.1)
HGB BLD-MCNC: 10.8 G/DL (ref 11.9–15.1)
HGB UR QL STRIP.AUTO: ABNORMAL
IMM GRANULOCYTES # BLD AUTO: 0.03 K/UL (ref 0–0.3)
IMM GRANULOCYTES NFR BLD: 0 %
KETONES UR STRIP-MCNC: ABNORMAL MG/DL
LEUKOCYTE ESTERASE UR QL STRIP: NEGATIVE
LIPASE SERPL-CCNC: 22 U/L (ref 13–60)
LYMPHOCYTES NFR BLD: 3.26 K/UL (ref 1.1–3.7)
LYMPHOCYTES RELATIVE PERCENT: 45 % (ref 25–45)
MCH RBC QN AUTO: 24.1 PG (ref 25.2–33.5)
MCHC RBC AUTO-ENTMCNC: 30.1 G/DL (ref 28.4–34.8)
MCV RBC AUTO: 80.1 FL (ref 82.6–102.9)
MONOCYTES NFR BLD: 0.59 K/UL (ref 0.1–1.4)
MONOCYTES NFR BLD: 8 % (ref 2–8)
NEUTROPHILS NFR BLD: 43 % (ref 34–64)
NEUTS SEG NFR BLD: 3.1 K/UL (ref 1.5–8.1)
NITRITE UR QL STRIP: NEGATIVE
NRBC BLD-RTO: 0 PER 100 WBC
PH UR STRIP: 6 [PH] (ref 5–8)
PLATELET # BLD AUTO: 313 K/UL (ref 138–453)
PMV BLD AUTO: 10.2 FL (ref 8.1–13.5)
POTASSIUM SERPL-SCNC: 3.8 MMOL/L (ref 3.7–5.3)
PROT SERPL-MCNC: 7.2 G/DL (ref 6.6–8.7)
PROT UR STRIP-MCNC: ABNORMAL MG/DL
RBC # BLD AUTO: 4.48 M/UL (ref 3.95–5.11)
RBC # BLD: ABNORMAL 10*6/UL
RBC #/AREA URNS HPF: NORMAL /HPF (ref 0–4)
SODIUM SERPL-SCNC: 138 MMOL/L (ref 136–145)
SOURCE: ABNORMAL
SP GR UR STRIP: 1.03 (ref 1–1.03)
TRICHOMONAS: NEGATIVE
UROBILINOGEN UR STRIP-ACNC: NORMAL EU/DL (ref 0–1)
WBC #/AREA URNS HPF: NORMAL /HPF (ref 0–5)
WBC OTHER # BLD: 7.2 K/UL (ref 4.5–13.5)

## 2024-06-25 PROCEDURE — 84703 CHORIONIC GONADOTROPIN ASSAY: CPT

## 2024-06-25 PROCEDURE — 87660 TRICHOMONAS VAGIN DIR PROBE: CPT

## 2024-06-25 PROCEDURE — 81001 URINALYSIS AUTO W/SCOPE: CPT

## 2024-06-25 PROCEDURE — 87591 N.GONORRHOEAE DNA AMP PROB: CPT

## 2024-06-25 PROCEDURE — 87480 CANDIDA DNA DIR PROBE: CPT

## 2024-06-25 PROCEDURE — 87510 GARDNER VAG DNA DIR PROBE: CPT

## 2024-06-25 PROCEDURE — 87491 CHLMYD TRACH DNA AMP PROBE: CPT

## 2024-06-25 PROCEDURE — 6370000000 HC RX 637 (ALT 250 FOR IP)

## 2024-06-25 PROCEDURE — 99283 EMERGENCY DEPT VISIT LOW MDM: CPT

## 2024-06-25 PROCEDURE — 80053 COMPREHEN METABOLIC PANEL: CPT

## 2024-06-25 PROCEDURE — 83690 ASSAY OF LIPASE: CPT

## 2024-06-25 PROCEDURE — 85025 COMPLETE CBC W/AUTO DIFF WBC: CPT

## 2024-06-25 RX ORDER — METRONIDAZOLE 500 MG/1
500 TABLET ORAL 2 TIMES DAILY
Qty: 14 TABLET | Refills: 0 | Status: SHIPPED | OUTPATIENT
Start: 2024-06-25 | End: 2024-07-02

## 2024-06-25 RX ORDER — METRONIDAZOLE 500 MG/1
500 TABLET ORAL ONCE
Status: COMPLETED | OUTPATIENT
Start: 2024-06-25 | End: 2024-06-25

## 2024-06-25 RX ORDER — ACETAMINOPHEN 325 MG/1
650 TABLET ORAL ONCE
Status: COMPLETED | OUTPATIENT
Start: 2024-06-25 | End: 2024-06-25

## 2024-06-25 RX ORDER — ACETAMINOPHEN 500 MG
1000 TABLET ORAL 3 TIMES DAILY
Qty: 42 TABLET | Refills: 0 | Status: SHIPPED | OUTPATIENT
Start: 2024-06-25 | End: 2024-07-02

## 2024-06-25 RX ADMIN — ACETAMINOPHEN 650 MG: 325 TABLET ORAL at 07:39

## 2024-06-25 RX ADMIN — METRONIDAZOLE 500 MG: 500 TABLET ORAL at 09:58

## 2024-06-25 ASSESSMENT — ENCOUNTER SYMPTOMS
SHORTNESS OF BREATH: 0
ABDOMINAL PAIN: 1

## 2024-06-25 ASSESSMENT — PAIN DESCRIPTION - ORIENTATION: ORIENTATION: LOWER

## 2024-06-25 ASSESSMENT — PAIN DESCRIPTION - LOCATION
LOCATION: VAGINA
LOCATION: ABDOMEN

## 2024-06-25 ASSESSMENT — PAIN SCALES - GENERAL
PAINLEVEL_OUTOF10: 5
PAINLEVEL_OUTOF10: 5

## 2024-06-25 ASSESSMENT — PAIN - FUNCTIONAL ASSESSMENT: PAIN_FUNCTIONAL_ASSESSMENT: 0-10

## 2024-06-25 NOTE — ED PROVIDER NOTES
[facility]     Emergency Department     Faculty Attestation    I performed a history and physical examination of the patient and discussed management with the resident. I reviewed the resident’s note and agree with the documented findings and plan of care. Any areas of disagreement are noted on the chart. I was personally present for the key portions of any procedures. I have documented in the chart those procedures where I was not present during the key portions. I have reviewed the emergency nurses triage note. I agree with the chief complaint, past medical history, past surgical history, allergies, medications, social and family history as documented unless otherwise noted below.   For Physician Assistant/ Nurse Practitioner cases/documentation I have personally evaluated this patient and have completed at least one if not all key elements of the E/M (history, physical exam, and MDM). Additional findings are as noted.      Primary Care Physician:  Lucie Whitman, APRN - CNP    CHIEF COMPLAINT       Chief Complaint   Patient presents with    Vaginal Bleeding     Passing out clots, had 2 periods in a month       RECENT VITALS:   Temp: 98.3 °F (36.8 °C),  Pulse: 90, Respirations: 18, BP: (!) 153/96    LABS:  Labs Reviewed   CBC WITH AUTO DIFFERENTIAL - Abnormal; Notable for the following components:       Result Value    Hemoglobin 10.8 (*)     Hematocrit 35.9 (*)     MCV 80.1 (*)     MCH 24.1 (*)     RDW 16.8 (*)     All other components within normal limits   C.TRACHOMATIS N.GONORRHOEAE DNA   VAGINITIS DNA PROBE   COMPREHENSIVE METABOLIC PANEL   LIPASE   URINALYSIS WITH REFLEX TO CULTURE   HCG, SERUM, QUALITATIVE         PERTINENT ATTENDING PHYSICIAN COMMENTS:    Patient here with vaginal bleeding pregnancy test is negative    Critical Care          Forrest Villanueva MD,  MD, FAAEM  Attending Emergency  Physician              Forrest Villanueva MD  06/25/24 [id number]    
Making  23-year-old female presents due to concerns for heavy vaginal bleeding, passing blood clots.  Patient notes mild lower abdominal cramping with this as well.  Took ibuprofen at home for symptoms.  Patient states her period has been irregular and she last had 1 in February prior to her most recent period at the beginning of the month.  Patient denies dizziness, chest pain, shortness of breath.  Patient hypertensive upon arrival, vital signs otherwise within normal.  On exam patient is alert, awake, no acute distress.  Heart is regular rhythm, lungs are clear to auscultation bilaterally.  Abdomen is soft and minimally tender to deep palpation over the suprapubic area.  No CVA tenderness.  No rebound or guarding.  Pelvic exam showing dark red blood in the vaginal vault with no significant clots seen on exam.  DDx: Pregnancy, ectopic pregnancy, UTI, menorrhagia, dysmenorrhea, metrorrhagia, anemia, electrolyte derangement.  Plan for labs, pregnancy testing, symptomatic control, reevaluation    Amount and/or Complexity of Data Reviewed  Labs: ordered. Decision-making details documented in ED Course.    Risk  OTC drugs.  Prescription drug management.      Patient's labs without leukocytosis.  Patient is slightly anemic at 10.8.  This is a decrease from 12.94 months ago.  Suspect this may be due to heavy periods.  Her pregnancy test is negative here.  No significant adnexal tenderness or abdominal tenderness on my exam.  Do not suspect ovarian torsion, appendicitis at this time.  Electrolytes are within normal, renal function is within normal.  Patient's vaginitis probe is positive for BV.  Will plan to treat BV.  Patient resting comfortably in bed throughout ED stay.  She has not been tachycardic or hypotensive to suggest acute blood loss anemia.  Will have patient follow-up with OB for further evaluation.  Given return precautions and patient expressed understanding.  Patient medically stable for

## 2024-06-25 NOTE — ED NOTES
The following labs were labeled with patient stickers & tubed to lab;    [x]Lavender   []On Ice  [x]Blue  [x]Green/ Yellow  []Green/ Black []On Ice  []Pink  []Red  [x]Yellow    []COVID-19 Swab []Rapid    []Urine Sample  []Pelvic Cultures    []Blood Cultures

## 2024-06-25 NOTE — DISCHARGE INSTRUCTIONS
You were seen due to concerns for vaginal bleeding, lower abdominal cramping.  Your bleeding is likely due to an irregular menstrual cycle.  You need to follow-up with OB for reevaluation of heavy vaginal bleeding and irregular periods.  Your pregnancy test was negative in the emergency department.  You will be discharged home with Flagyl as you were positive for bacterial vaginosis in the emergency department which is an overgrowth of your normal vaginal bacteria.  You also be given Tylenol that you can take in addition to ibuprofen at home to help with abdominal cramping.  Return the emergency department for any worsening pain, bleeding, dizziness or loss consciousness, chest pain or shortness of breath, other new or concerning symptom

## 2024-06-25 NOTE — ED TRIAGE NOTES
Pt presents to ED with complaint of vaginal bleeding with clots.  Pt states she had her period the 1st week of this month and she started to bleed again yesterday with clots.  Pt states prior to this month, she had her period last on February.  Pt states bleeding is worst today, saturated a pad.   Pt said she had lower abdominal cramping, took Ibuprofen at home.  Pt said she had multiple pregnancies in the past.

## 2024-08-17 ENCOUNTER — HOSPITAL ENCOUNTER (EMERGENCY)
Age: 24
Discharge: LEFT BEFORE DISCHARGE | End: 2024-08-18

## 2024-08-17 VITALS
RESPIRATION RATE: 18 BRPM | DIASTOLIC BLOOD PRESSURE: 80 MMHG | SYSTOLIC BLOOD PRESSURE: 124 MMHG | HEART RATE: 94 BPM | WEIGHT: 197.53 LBS | OXYGEN SATURATION: 99 % | HEIGHT: 64 IN | BODY MASS INDEX: 33.72 KG/M2 | TEMPERATURE: 97.6 F

## 2024-12-16 ENCOUNTER — HOSPITAL ENCOUNTER (EMERGENCY)
Age: 24
Discharge: HOME | End: 2024-12-16
Attending: EMERGENCY MEDICINE
Payer: COMMERCIAL

## 2024-12-16 VITALS
WEIGHT: 170 LBS | DIASTOLIC BLOOD PRESSURE: 94 MMHG | HEIGHT: 64 IN | HEART RATE: 89 BPM | TEMPERATURE: 98.8 F | OXYGEN SATURATION: 100 % | SYSTOLIC BLOOD PRESSURE: 132 MMHG | RESPIRATION RATE: 18 BRPM | BODY MASS INDEX: 29.02 KG/M2

## 2024-12-16 DIAGNOSIS — Z32.01 POSITIVE URINE PREGNANCY TEST: ICD-10-CM

## 2024-12-16 DIAGNOSIS — Z32.01 POSITIVE PREGNANCY TEST: Primary | ICD-10-CM

## 2024-12-16 LAB
B-HCG SERPL EIA 3RD IS-ACNC: NORMAL MIU/ML
BASOPHILS # BLD: 0.03 K/UL (ref 0–0.2)
BASOPHILS NFR BLD: 0 % (ref 0–2)
EOSINOPHIL # BLD: 0.15 K/UL (ref 0–0.44)
EOSINOPHILS RELATIVE PERCENT: 2 % (ref 1–4)
ERYTHROCYTE [DISTWIDTH] IN BLOOD BY AUTOMATED COUNT: 15 % (ref 11.8–14.4)
HCG SERPL QL: POSITIVE
HCT VFR BLD AUTO: 34.2 % (ref 36.3–47.1)
HGB BLD-MCNC: 10.4 G/DL (ref 11.9–15.1)
IMM GRANULOCYTES # BLD AUTO: 0.03 K/UL (ref 0–0.3)
IMM GRANULOCYTES NFR BLD: 0 %
LYMPHOCYTES NFR BLD: 2.73 K/UL (ref 1.1–3.7)
LYMPHOCYTES RELATIVE PERCENT: 31 % (ref 24–43)
MCH RBC QN AUTO: 22.6 PG (ref 25.2–33.5)
MCHC RBC AUTO-ENTMCNC: 30.4 G/DL (ref 28.4–34.8)
MCV RBC AUTO: 74.2 FL (ref 82.6–102.9)
MONOCYTES NFR BLD: 0.62 K/UL (ref 0.1–1.2)
MONOCYTES NFR BLD: 7 % (ref 3–12)
NEUTROPHILS NFR BLD: 60 % (ref 36–65)
NEUTS SEG NFR BLD: 5.39 K/UL (ref 1.5–8.1)
NRBC BLD-RTO: 0 PER 100 WBC
PLATELET # BLD AUTO: 270 K/UL (ref 138–453)
PMV BLD AUTO: 10.1 FL (ref 8.1–13.5)
RBC # BLD AUTO: 4.61 M/UL (ref 3.95–5.11)
RBC # BLD: ABNORMAL 10*6/UL
WBC OTHER # BLD: 9 K/UL (ref 3.5–11.3)

## 2024-12-16 PROCEDURE — 84703 CHORIONIC GONADOTROPIN ASSAY: CPT

## 2024-12-16 PROCEDURE — 99283 EMERGENCY DEPT VISIT LOW MDM: CPT

## 2024-12-16 PROCEDURE — 84702 CHORIONIC GONADOTROPIN TEST: CPT

## 2024-12-16 PROCEDURE — 85025 COMPLETE CBC W/AUTO DIFF WBC: CPT

## 2024-12-16 RX ORDER — PNV NO.95/FERROUS FUM/FOLIC AC 28MG-0.8MG
1 TABLET ORAL DAILY
Qty: 30 TABLET | Refills: 0 | Status: SHIPPED | OUTPATIENT
Start: 2024-12-16 | End: 2025-01-15

## 2024-12-16 ASSESSMENT — LIFESTYLE VARIABLES
HOW MANY STANDARD DRINKS CONTAINING ALCOHOL DO YOU HAVE ON A TYPICAL DAY: PATIENT DOES NOT DRINK
HOW OFTEN DO YOU HAVE A DRINK CONTAINING ALCOHOL: NEVER

## 2024-12-16 ASSESSMENT — ENCOUNTER SYMPTOMS
ABDOMINAL DISTENTION: 0
NAUSEA: 1
SHORTNESS OF BREATH: 0
RHINORRHEA: 0
COLOR CHANGE: 0
CHEST TIGHTNESS: 0
STRIDOR: 0
ABDOMINAL PAIN: 0
VOMITING: 1
PHOTOPHOBIA: 0
EYE DISCHARGE: 0
DIARRHEA: 0
BLOOD IN STOOL: 0
CONSTIPATION: 0

## 2024-12-16 ASSESSMENT — PAIN SCALES - GENERAL: PAINLEVEL_OUTOF10: 0

## 2024-12-16 NOTE — ED NOTES
Patient presents to ED for confirmation of pregnancy.  LMP was beginning of November, had positive home test on Friday.  Reports calling OB office for appt and was told she needed confirmation of preg first  Patient reports some nausea and emesis  Denies any abdominal pain/cramping and no vaginal bleeding  Patient a/o x 4 with NAD noted

## 2024-12-16 NOTE — ED PROVIDER NOTES
[facility] EMERGENCY DEPARTMENT     Emergency Department     Faculty Attestation        I performed a history and physical examination of the patient and discussed management with the resident. I reviewed the resident’s note and agree with the documented findings and plan of care. Any areas of disagreement are noted on the chart. I was personally present for the key portions of any procedures. I have documented in the chart those procedures where I was not present during the key portions. I have reviewed the emergency nurses triage note. I agree with the chief complaint, past medical history, past surgical history, allergies, medications, social and family history as documented unless otherwise noted below.    For mid-level providers such as nurse practitioners as well as physicians assistants:    I have personally seen and evaluated the patient.    I find the patient's history and physical exam are consistent with NP/PA documentation.  I agree with the care provided, treatment rendered, disposition, & follow-up plan.     Additional findings are as noted.    Vital Signs: BP (!) 132/94   Pulse 89   Temp 98.8 °F (37.1 °C) (Oral)   Resp 18   Ht 1.626 m (5' 4\")   Wt 77.1 kg (170 lb)   LMP 11/26/2024 (Approximate)   SpO2 100%   BMI 29.18 kg/m²   PCP:  Lucie Whitman, APRN - CNP    Pertinent Comments:     Patient had a home pregnancy test was positive she is requesting cath migration was blood pregnancy.  She has no pelvic pain or vaginal bleeding or pelvic complaints.      Critical Care  None          Remy Sellers MD    Attending Emergency Medicine Physician            Ladarius Sellers MD  12/16/24 1011

## 2024-12-16 NOTE — DISCHARGE INSTRUCTIONS
Please set up an appointment with your ObGyn doctor as soon as possible.    If you notice any concerning symptoms please return to the ER immediately. These can include but are not limited to: fevers, chills, shortness of breath, vomiting, weakness of the extremities, changes in your mental status, numbness, pale extremities, or chest pain.     Wound care: none    Diet: You may resume your normal diet     Activity: resume activity as tolerated.     Medications: Continue taking your home medications as previously directed. For pain You may take tylenol 1,000mg by mouth every 6 hours as needed for pain. Do not exceed 4,000mg per day. If you have liver disease don't take tylenol. You may also take ibuprofen 600mg every 6-8 hours as needed for pain. Do not exceed 2,400 mg per day. If you experience stomach pain or you have a history of kidney disease stop taking ibuprofen. You may alternate application of ice and heat 20 minutes at a time as desired.      Follow up: Please follow up with your primary care doctor within one week or as needed.

## 2024-12-16 NOTE — ED PROVIDER NOTES
[facility] EMERGENCY DEPARTMENT  Emergency Department Encounter  Emergency Medicine Resident     Pt Name:Hodan Renteria  MRN: [id number]  Birthdate 2000  Date of evaluation: 12/16/24  PCP:  Lucie Whitman APRN - CNP  Note Started: 10:12 AM EST      CHIEF COMPLAINT       No chief complaint on file.      HISTORY OF PRESENT ILLNESS  (Location/Symptom, Timing/Onset, Context/Setting, Quality, Duration, Modifying Factors, Severity.)      Hodan Renteria is a 24 y.o. female who presents with nausea and positive urine pregnancy test at home. Last menstrual period  was early November. Patient denies any other complaint at this time. Patient called Ob office and was told to have a confirmed pregnancy test before scheduling the appointment.    PAST MEDICAL / SURGICAL / SOCIAL / FAMILY HISTORY      has a past medical history of BV (bacterial vaginosis), Chlamydia, Chlamydia infection, Chlamydia infection affecting pregnancy, cHTN (no meds), Closed fracture of phalanx of middle finger, Gonorrhea, History of blood transfusion, Seasonal allergies, Strep throat, Trichomonas infection, and Trichomonas vaginitis.       has no past surgical history on file.      Social History     Socioeconomic History    Marital status: Single     Spouse name: Not on file    Number of children: Not on file    Years of education: Not on file    Highest education level: Not on file   Occupational History    Not on file   Tobacco Use    Smoking status: Never     Passive exposure: Yes    Smokeless tobacco: Never   Vaping Use    Vaping status: Never Used    Passive vaping exposure: Yes   Substance and Sexual Activity    Alcohol use: Not Currently     Comment: Not during pregnancy (last drink summer 2021)    Drug use: Never    Sexual activity: Yes     Partners: Male   Other Topics Concern    Not on file   Social History Narrative    Not on file     Social Determinants of Health     Financial Resource Strain: Medium Risk (2/15/2022)

## 2025-01-12 ENCOUNTER — HOSPITAL ENCOUNTER (EMERGENCY)
Age: 25
Discharge: HOME | End: 2025-01-12
Attending: EMERGENCY MEDICINE
Payer: COMMERCIAL

## 2025-01-12 VITALS
DIASTOLIC BLOOD PRESSURE: 68 MMHG | WEIGHT: 170 LBS | TEMPERATURE: 98.4 F | HEIGHT: 64 IN | RESPIRATION RATE: 16 BRPM | OXYGEN SATURATION: 100 % | HEART RATE: 95 BPM | BODY MASS INDEX: 29.02 KG/M2 | SYSTOLIC BLOOD PRESSURE: 117 MMHG

## 2025-01-12 DIAGNOSIS — Z34.90 EARLY STAGE OF PREGNANCY: ICD-10-CM

## 2025-01-12 DIAGNOSIS — G44.209 TENSION HEADACHE: Primary | ICD-10-CM

## 2025-01-12 LAB
ANION GAP SERPL CALCULATED.3IONS-SCNC: 13 MMOL/L (ref 9–16)
BASOPHILS # BLD: <0.03 K/UL (ref 0–0.2)
BASOPHILS NFR BLD: 0 % (ref 0–2)
BILIRUB UR QL STRIP: NEGATIVE
BUN SERPL-MCNC: 12 MG/DL (ref 6–20)
CALCIUM SERPL-MCNC: 9.7 MG/DL (ref 8.6–10.4)
CHLORIDE SERPL-SCNC: 101 MMOL/L (ref 98–107)
CLARITY UR: ABNORMAL
CO2 SERPL-SCNC: 23 MMOL/L (ref 20–31)
COLOR UR: YELLOW
CREAT SERPL-MCNC: 0.6 MG/DL (ref 0.6–0.9)
EOSINOPHIL # BLD: 0.14 K/UL (ref 0–0.44)
EOSINOPHILS RELATIVE PERCENT: 1 % (ref 1–4)
EPI CELLS #/AREA URNS HPF: NORMAL /HPF (ref 0–5)
ERYTHROCYTE [DISTWIDTH] IN BLOOD BY AUTOMATED COUNT: 16.4 % (ref 11.8–14.4)
GFR, ESTIMATED: >90 ML/MIN/1.73M2
GLUCOSE SERPL-MCNC: 127 MG/DL (ref 74–99)
GLUCOSE UR STRIP-MCNC: NEGATIVE MG/DL
HCT VFR BLD AUTO: 34.9 % (ref 36.3–47.1)
HGB BLD-MCNC: 11.3 G/DL (ref 11.9–15.1)
HGB UR QL STRIP.AUTO: NEGATIVE
IMM GRANULOCYTES # BLD AUTO: 0.05 K/UL (ref 0–0.3)
IMM GRANULOCYTES NFR BLD: 0 %
KETONES UR STRIP-MCNC: NEGATIVE MG/DL
LEUKOCYTE ESTERASE UR QL STRIP: NEGATIVE
LYMPHOCYTES NFR BLD: 3.87 K/UL (ref 1.1–3.7)
LYMPHOCYTES RELATIVE PERCENT: 31 % (ref 24–43)
MCH RBC QN AUTO: 23.3 PG (ref 25.2–33.5)
MCHC RBC AUTO-ENTMCNC: 32.4 G/DL (ref 28.4–34.8)
MCV RBC AUTO: 72.1 FL (ref 82.6–102.9)
MONOCYTES NFR BLD: 0.68 K/UL (ref 0.1–1.2)
MONOCYTES NFR BLD: 6 % (ref 3–12)
NEUTROPHILS NFR BLD: 62 % (ref 36–65)
NEUTS SEG NFR BLD: 7.69 K/UL (ref 1.5–8.1)
NITRITE UR QL STRIP: NEGATIVE
NRBC BLD-RTO: 0 PER 100 WBC
PH UR STRIP: 5.5 [PH] (ref 5–8)
PLATELET # BLD AUTO: 293 K/UL (ref 138–453)
PMV BLD AUTO: 10.5 FL (ref 8.1–13.5)
POTASSIUM SERPL-SCNC: 3.6 MMOL/L (ref 3.7–5.3)
PROT UR STRIP-MCNC: NEGATIVE MG/DL
RBC # BLD AUTO: 4.84 M/UL (ref 3.95–5.11)
RBC # BLD: ABNORMAL 10*6/UL
RBC #/AREA URNS HPF: NORMAL /HPF (ref 0–4)
SODIUM SERPL-SCNC: 137 MMOL/L (ref 136–145)
SP GR UR STRIP: 1.03 (ref 1–1.03)
UROBILINOGEN UR STRIP-ACNC: NORMAL EU/DL (ref 0–1)
WBC #/AREA URNS HPF: NORMAL /HPF (ref 0–5)
WBC OTHER # BLD: 12.5 K/UL (ref 3.5–11.3)

## 2025-01-12 PROCEDURE — 96374 THER/PROPH/DIAG INJ IV PUSH: CPT

## 2025-01-12 PROCEDURE — 81001 URINALYSIS AUTO W/SCOPE: CPT

## 2025-01-12 PROCEDURE — 99284 EMERGENCY DEPT VISIT MOD MDM: CPT

## 2025-01-12 PROCEDURE — 96375 TX/PRO/DX INJ NEW DRUG ADDON: CPT

## 2025-01-12 PROCEDURE — 85025 COMPLETE CBC W/AUTO DIFF WBC: CPT

## 2025-01-12 PROCEDURE — 80048 BASIC METABOLIC PNL TOTAL CA: CPT

## 2025-01-12 PROCEDURE — 6360000002 HC RX W HCPCS

## 2025-01-12 PROCEDURE — 6370000000 HC RX 637 (ALT 250 FOR IP)

## 2025-01-12 RX ORDER — DEXAMETHASONE SODIUM PHOSPHATE 10 MG/ML
6 INJECTION INTRAMUSCULAR; INTRAVENOUS ONCE
Status: COMPLETED | OUTPATIENT
Start: 2025-01-12 | End: 2025-01-12

## 2025-01-12 RX ORDER — ACETAMINOPHEN 500 MG
1000 TABLET ORAL ONCE
Status: COMPLETED | OUTPATIENT
Start: 2025-01-12 | End: 2025-01-12

## 2025-01-12 RX ORDER — METOCLOPRAMIDE HYDROCHLORIDE 5 MG/ML
10 INJECTION INTRAMUSCULAR; INTRAVENOUS ONCE
Status: COMPLETED | OUTPATIENT
Start: 2025-01-12 | End: 2025-01-12

## 2025-01-12 RX ORDER — ONDANSETRON 2 MG/ML
4 INJECTION INTRAMUSCULAR; INTRAVENOUS ONCE
Status: COMPLETED | OUTPATIENT
Start: 2025-01-12 | End: 2025-01-12

## 2025-01-12 RX ADMIN — DEXAMETHASONE SODIUM PHOSPHATE 6 MG: 10 INJECTION INTRAMUSCULAR; INTRAVENOUS at 22:29

## 2025-01-12 RX ADMIN — ACETAMINOPHEN 1000 MG: 500 TABLET, FILM COATED ORAL at 22:32

## 2025-01-12 RX ADMIN — ONDANSETRON 4 MG: 2 INJECTION INTRAMUSCULAR; INTRAVENOUS at 22:29

## 2025-01-12 RX ADMIN — METOCLOPRAMIDE 10 MG: 5 INJECTION, SOLUTION INTRAMUSCULAR; INTRAVENOUS at 22:29

## 2025-01-12 ASSESSMENT — PAIN DESCRIPTION - LOCATION: LOCATION: HEAD

## 2025-01-12 ASSESSMENT — PAIN DESCRIPTION - DESCRIPTORS: DESCRIPTORS: THROBBING

## 2025-01-12 ASSESSMENT — PAIN SCALES - GENERAL: PAINLEVEL_OUTOF10: 8

## 2025-01-13 NOTE — ED NOTES
Pt presents to ED via walking through triage c/o headache ongoing for one week. Pt reports light sensitivity. Pt reports last visit she found out she was pregnant. Pt unsure of how far along she is. Pt reports last menses was \"sometime in November.\" Pt reports this is sixth pregnancy. Pt reports history of miscarriage. Pt reports one emesis episode around noon. Pt reports has eaten since and has not vomited following. Pt reports \"some\" nausea. Pt denies any chest pain or shortness of breath. Pt reports has not followed with OB, pt also states does not have PCP.  Pt is Aox4 and speaking in complete sentences.  NAD noted.  Dr. Villatoro at bedside upon triage.

## 2025-01-13 NOTE — DISCHARGE INSTRUCTIONS
-You were seen and evaluated by emergency medicine physicians at [facility].    -Please follow-up with your primary care physician and/or with the referrals to specialist.    -You were diagnosed with: Tension headache, early pregnancy    -You had improvement in your symptoms following IV medications.  Please follow-up with your PCP and your OB/GYN.  -Provided is a list of pregnancy safe medications:  - Acceptable Medications for Pregnant and Breastfeeding Women    Do not take any prescribed or over the counter medications during pregnancy unless we have prescribed them or you have checked with us first. The following medications, however, may be taken according to the package directions without checking with us.    Headaches and other Aches and Pains  Tylenol or Extra Strength Tylenol (acetaminophen)- can take up to 4000mg total in 24 hour period    Cold and Flu (Do not take any medicines with suffix D (ie. Robitussin-D, Sudafed-D, Mucinex-D) as it contains Pseudoephedrine which should be avoided in pregnancy)  Plain Robitussin  Plain Tylenol Cold and Flu  Plain Sudafed  Plain Mucinex  Benadryl    Seasonal Allergies  Benadryl   Claritin  Zyrtec    Heartburn  Tums  Rolaids  Maalox  Mylanta  Gas- Ex  Pepcid    Diarrhea  Imodium      -Please return to the Emergency Department if you are experiencing the following symptoms acutely:  Headache, fever, chills, nausea, vomiting, chest pain, shortness of breath, abdominal pain, change with urination, change with bowel movements, change in your skin/hair/nail, weakness, fatigue, altered mental status and/or any change from baseline health.    -Thank you for coming to [facility].

## 2025-01-13 NOTE — ED PROVIDER NOTES
[facility]  Emergency Department  Faculty Attestation     I performed a history and physical examination of the patient and discussed management with the resident. I reviewed the resident’s note and agree with the documented findings and plan of care. Any areas of disagreement are noted on the chart. I was personally present for the key portions of any procedures. I have documented in the chart those procedures where I was not present during the key portions. I have reviewed the emergency nurses triage note. I agree with the chief complaint, past medical history, past surgical history, allergies, medications, social and family history as documented unless otherwise noted below.    For Physician Assistant/ Nurse Practitioner cases/documentation I have personally evaluated this patient and have completed at least one if not all key elements of the E/M (history, physical exam, and MDM). Additional findings are as noted.    Preliminary note started at 10:46 PM EST    Primary Care Physician:  Lucie Whitman APRN - CNP    Screenings:  [unfilled]    CHIEF COMPLAINT       Chief Complaint   Patient presents with    Headache     X1 week       RECENT VITALS:   /68   Pulse 95   Temp 98.4 °F (36.9 °C) (Oral)   Resp 16   Ht 1.626 m (5' 4\")   Wt 77.1 kg (170 lb)   LMP 11/26/2024 (Approximate)   SpO2 100%   BMI 29.18 kg/m²     LABS:  Labs Reviewed   CBC WITH AUTO DIFFERENTIAL - Abnormal; Notable for the following components:       Result Value    WBC 12.5 (*)     Hemoglobin 11.3 (*)     Hematocrit 34.9 (*)     MCV 72.1 (*)     MCH 23.3 (*)     RDW 16.4 (*)     Lymphocytes Absolute 3.87 (*)     All other components within normal limits   URINALYSIS WITH REFLEX TO CULTURE   BASIC METABOLIC PANEL       Radiology  No orders to display       Attending Physician Additional  Notes    Patient is approximately 10 weeks pregnant, confirmed hCG but no confirmed ultrasound as yet, having

## 2025-01-13 NOTE — ED PROVIDER NOTES
[facility] EMERGENCY DEPARTMENT  Emergency Department Encounter  Emergency Medicine Resident     Pt Name:Hodan Renteria  MRN: [id number]  Birthdate 2000  Date of evaluation: 1/12/25  PCP:  Lucie Whitman APRN - CNP  Note Started: 10:05 PM EST      CHIEF COMPLAINT       Chief Complaint   Patient presents with    Headache     X1 week       HISTORY OF PRESENT ILLNESS  (Location/Symptom, Timing/Onset, Context/Setting, Quality, Duration, Modifying Factors, Severity.)      Hodan Renteria is a 24-year-old female who presents to the ED with approximately 1 week of a headache with associated intermittent nausea and nonbloody nonbilious vomiting.  Patient still tolerating oral intake of food and water but states due to the nausea she is experiencing, she has decreased her oral intake.  Patient is currently pregnant.  LMP was in early November 2024.  Patient is sexually active.  Denies any acute vision changes, sudden onset headache (worst headache of her life), neck pain, chest pain, shortness of breath, sinus congestion, rhinorrhea, abdominal pain, dysuria/hematuria, vaginal discharge, vaginal bleeding, change in bowel movements.  Patient has no chronic health issues.  Does not use any recreational drugs.  Does not use tobacco products.  Does not drink heavily.    Patient is G6, P1.     Patient does endorse mild light sensitivity.  No history of officially diagnosed migraines.  Patient describes the headache as a band around her forehead.    Patient is taking prenatal vitamins    PAST MEDICAL / SURGICAL / SOCIAL / FAMILY HISTORY      has a past medical history of BV (bacterial vaginosis), Chlamydia, Chlamydia infection, Chlamydia infection affecting pregnancy, cHTN (no meds), Closed fracture of phalanx of middle finger, Gonorrhea, History of blood transfusion, Seasonal allergies, Strep throat, Trichomonas infection, and Trichomonas vaginitis.       has no past surgical history on file.      Social History

## 2025-01-13 NOTE — ED NOTES
The following labs were labeled with appropriate pt sticker and tubed to lab:     [] Blue     [x] Lavender   [] on ice  [x] Green/yellow  [] Green/black [] on ice  [] [place]  [] on ice  [x] Yellow  [] Red  [] Pink  [] Type/ Screen  [] ABG  [] VBG    [] COVID-19 swab    [] Rapid  [] PCR  [] Flu swab  [] Peds Viral Panel     [] Urine Sample  [] Fecal Sample  [] Pelvic Cultures  [] Blood Cultures  [] X 2  [] STREP Cultures  [] Wound Cultures

## 2025-01-16 ENCOUNTER — TELEPHONE (OUTPATIENT)
Dept: OBGYN | Age: 25
End: 2025-01-16

## 2025-01-16 DIAGNOSIS — O21.9 NAUSEA/VOMITING IN PREGNANCY: Primary | ICD-10-CM

## 2025-01-17 RX ORDER — PNV NO.95/FERROUS FUM/FOLIC AC 28MG-0.8MG
1 TABLET ORAL DAILY
Qty: 60 TABLET | Refills: 4 | Status: SHIPPED | OUTPATIENT
Start: 2025-01-17

## 2025-01-17 RX ORDER — LANOLIN ALCOHOL/MO/W.PET/CERES
50 CREAM (GRAM) TOPICAL DAILY
Qty: 30 TABLET | Refills: 3 | Status: SHIPPED | OUTPATIENT
Start: 2025-01-17

## 2025-01-31 NOTE — PROGRESS NOTES
Documentation:  I communicated with the patient and/or health care decision maker about the OB intake.   Details of this discussion including any medical advice provided: see notes    Total Time: minutes: 21-30 minutes    Hodan Renteria was evaluated through a synchronous (real-time) audio encounter. Patient identification was verified at the start of the visit. She (or guardian if applicable) is aware that this is a billable service, which includes applicable co-pays. This visit was conducted with the patient's (and/or legal guardian's) verbal consent. She has not had a related appointment within my department in the past 7 days or scheduled within the next 24 hours.   The patient was located at Home: [address] [place] [id number].  The provider was located at Facility (Appt Dept): [address] [place] [id number].  Confirm you are appropriately licensed, registered, or certified to deliver care in the state where the patient is located as indicated above. If you are not or unsure, please re-schedule the visit: Yes, I confirm.     Note: not billable if this call serves to triage the patient into an appointment for the relevant concern    Hodan Renteria is a 24 y.o. female evaluated via telephone on 2/3/2025 for No chief complaint on file.  .        Jenae Guerra RN    Risk factors for current pregnancy: Same partner; cHTN; hx SAB x3, TAB x1; hx marijuana use; exposure to genital herpes; pregravid BMI 30.12    Patient occupation: Unemployed  Patient lives with: Child, patient's mother  Primary Care Provider: None  Last ER visit: 27, tension headache  Planned/unplanned pregnancy: unplanned, unprevented  Father of baby: FOB#1               LMP: unknown  Menses monthly: Yes  BCP at conception: No  Dating ultrasound: First trimester, 25, 11w1d  Delivery hx: normal spontaneous vaginal delivery  Hx spontaneous  delivery: No  Last Pap: Never          Report available:

## 2025-02-03 ENCOUNTER — SCHEDULED TELEPHONE ENCOUNTER (OUTPATIENT)
Dept: OBGYN | Age: 25
End: 2025-02-03
Payer: COMMERCIAL

## 2025-02-03 ENCOUNTER — FOLLOWUP TELEPHONE ENCOUNTER (OUTPATIENT)
Dept: OBGYN | Age: 25
End: 2025-02-03

## 2025-02-03 VITALS — BODY MASS INDEX: 30.11 KG/M2 | HEIGHT: 63 IN

## 2025-02-03 DIAGNOSIS — Z13.31 NEGATIVE DEPRESSION SCREENING: ICD-10-CM

## 2025-02-03 DIAGNOSIS — Z28.21 COVID-19 VACCINATION DECLINED: ICD-10-CM

## 2025-02-03 DIAGNOSIS — N96 HISTORY OF RECURRENT MISCARRIAGES: ICD-10-CM

## 2025-02-03 DIAGNOSIS — Z33.2 THERAPEUTIC ABORTION: Primary | ICD-10-CM

## 2025-02-03 DIAGNOSIS — F12.91 HISTORY OF MARIJUANA USE: ICD-10-CM

## 2025-02-03 DIAGNOSIS — O09.90 HIGH RISK PREGNANCY, ANTEPARTUM: ICD-10-CM

## 2025-02-03 DIAGNOSIS — O10.919 HTN IN PREGNANCY, CHRONIC: ICD-10-CM

## 2025-02-03 DIAGNOSIS — Z20.2 EXPOSURE TO GENITAL HERPES: ICD-10-CM

## 2025-02-03 PROBLEM — U07.1 COVID-19: Status: RESOLVED | Noted: 2022-09-21 | Resolved: 2025-02-03

## 2025-02-03 PROBLEM — N39.0 URINARY TRACT INFECTION IN FEMALE: Status: RESOLVED | Noted: 2023-10-14 | Resolved: 2025-02-03

## 2025-02-03 PROBLEM — Z87.59 PREVIOUS BABY WITH FETAL GROWTH RESTRICTION: Status: ACTIVE | Noted: 2022-12-27

## 2025-02-03 PROBLEM — R10.30 LOWER ABDOMINAL PAIN: Status: RESOLVED | Noted: 2023-10-14 | Resolved: 2025-02-03

## 2025-02-03 PROBLEM — A74.9 CHLAMYDIA: Status: RESOLVED | Noted: 2022-06-16 | Resolved: 2025-02-03

## 2025-02-03 PROBLEM — N93.9 VAGINAL BLEEDING: Status: RESOLVED | Noted: 2023-10-14 | Resolved: 2025-02-03

## 2025-02-03 PROCEDURE — H1000 PRENATAL CARE ATRISK ASSESSM: HCPCS | Performed by: STUDENT IN AN ORGANIZED HEALTH CARE EDUCATION/TRAINING PROGRAM

## 2025-02-03 RX ORDER — PNV NO.95/FERROUS FUM/FOLIC AC 28MG-0.8MG
1 TABLET ORAL DAILY
Qty: 30 TABLET | Refills: 12 | Status: SHIPPED | OUTPATIENT
Start: 2025-02-03

## 2025-02-03 RX ORDER — ASPIRIN 81 MG/1
81 TABLET, CHEWABLE ORAL DAILY
Qty: 60 TABLET | Refills: 5 | Status: SHIPPED | OUTPATIENT
Start: 2025-02-03

## 2025-02-03 ASSESSMENT — PATIENT HEALTH QUESTIONNAIRE - PHQ9
SUM OF ALL RESPONSES TO PHQ QUESTIONS 1-9: 0
SUM OF ALL RESPONSES TO PHQ9 QUESTIONS 1 & 2: 0
2. FEELING DOWN, DEPRESSED OR HOPELESS: NOT AT ALL
SUM OF ALL RESPONSES TO PHQ QUESTIONS 1-9: 0
1. LITTLE INTEREST OR PLEASURE IN DOING THINGS: NOT AT ALL

## 2025-02-03 NOTE — TELEPHONE ENCOUNTER
live with someone identified as having elevated lead levels, child, close friend or relative? No      Patients answering yes to any one of the above questions, offer blood lead level testing (LAB98), associate with diagnosis of Screening for Lead Poisoning, Z13.88.

## 2025-02-05 ENCOUNTER — HOSPITAL ENCOUNTER (OUTPATIENT)
Age: 25
Setting detail: SPECIMEN
Discharge: HOME | End: 2025-02-05

## 2025-02-05 DIAGNOSIS — O09.90 HIGH RISK PREGNANCY, ANTEPARTUM: ICD-10-CM

## 2025-02-05 DIAGNOSIS — O10.919 HTN IN PREGNANCY, CHRONIC: ICD-10-CM

## 2025-02-24 ENCOUNTER — HOSPITAL ENCOUNTER (OUTPATIENT)
Age: 25
Setting detail: SPECIMEN
Discharge: HOME | End: 2025-02-24

## 2025-02-24 ENCOUNTER — INITIAL PRENATAL (OUTPATIENT)
Dept: OBGYN | Age: 25
End: 2025-02-24

## 2025-02-24 VITALS
WEIGHT: 182 LBS | SYSTOLIC BLOOD PRESSURE: 123 MMHG | BODY MASS INDEX: 32.24 KG/M2 | DIASTOLIC BLOOD PRESSURE: 77 MMHG | HEART RATE: 104 BPM

## 2025-02-24 DIAGNOSIS — F12.91 HISTORY OF MARIJUANA USE: ICD-10-CM

## 2025-02-24 DIAGNOSIS — Z3A.16 16 WEEKS GESTATION OF PREGNANCY: ICD-10-CM

## 2025-02-24 DIAGNOSIS — O09.90 HIGH RISK PREGNANCY, ANTEPARTUM: ICD-10-CM

## 2025-02-24 DIAGNOSIS — O21.9 NAUSEA AND VOMITING DURING PREGNANCY: ICD-10-CM

## 2025-02-24 DIAGNOSIS — O10.919 HTN IN PREGNANCY, CHRONIC: ICD-10-CM

## 2025-02-24 DIAGNOSIS — O09.92 HIGH-RISK PREGNANCY IN SECOND TRIMESTER: Primary | ICD-10-CM

## 2025-02-24 LAB
ABO + RH BLD: NORMAL
ALBUMIN SERPL-MCNC: 3.9 G/DL (ref 3.5–5.2)
ALBUMIN/GLOB SERPL: 1.2 {RATIO} (ref 1–2.5)
ALP SERPL-CCNC: 74 U/L (ref 35–104)
ALT SERPL-CCNC: 11 U/L (ref 10–35)
ANION GAP SERPL CALCULATED.3IONS-SCNC: 12 MMOL/L (ref 9–16)
AST SERPL-CCNC: 14 U/L (ref 10–35)
BILIRUB SERPL-MCNC: <0.2 MG/DL (ref 0–1.2)
BLOOD GROUP ANTIBODIES SERPL: NEGATIVE
BUN SERPL-MCNC: 8 MG/DL (ref 6–20)
CALCIUM SERPL-MCNC: 9.3 MG/DL (ref 8.6–10.4)
CHLORIDE SERPL-SCNC: 103 MMOL/L (ref 98–107)
CO2 SERPL-SCNC: 23 MMOL/L (ref 20–31)
CREAT SERPL-MCNC: 0.5 MG/DL (ref 0.6–0.9)
GFR, ESTIMATED: >90 ML/MIN/1.73M2
GLUCOSE SERPL-MCNC: 81 MG/DL (ref 74–99)
HCV AB SERPL QL IA: NONREACTIVE
HIV 1+2 AB+HIV1 P24 AG SERPL QL IA: NONREACTIVE
POTASSIUM SERPL-SCNC: 4.3 MMOL/L (ref 3.7–5.3)
PROT SERPL-MCNC: 7.2 G/DL (ref 6.6–8.7)
SODIUM SERPL-SCNC: 138 MMOL/L (ref 136–145)

## 2025-02-24 PROCEDURE — 0500F INITIAL PRENATAL CARE VISIT: CPT

## 2025-02-24 RX ORDER — LANOLIN ALCOHOL/MO/W.PET/CERES
50 CREAM (GRAM) TOPICAL DAILY
Qty: 30 TABLET | Refills: 3 | Status: SHIPPED | OUTPATIENT
Start: 2025-02-24

## 2025-02-24 NOTE — PROGRESS NOTES
Attending Physician Statement  I have discussed the care of Hodan Renteria, including pertinent history and exam findings, with the resident. I have reviewed the key elements of all parts of the encounter with the resident.  I agree with the assessment, plan and orders as documented by the resident.  (GE Modifier)    Hannah Najera [facility], [facility]  2/24/2025, 2:25 PM    
    2 2021     SAB      1 2020     SAB         Obstetric Comments   G1-G6: FOB#1      G1, G2, G3: No intervention   G4:    G5: TAB, medication   G6:       Past Medical History:  Past Medical History:   Diagnosis Date    Chlamydia     2021    cHTN (no meds) 2022    Closed fracture of phalanx of middle finger 2016    Gonorrhea 2021    Herpes simplex virus (HSV) infection 2022    HSV IgM 1&2 0.96, equivocal    History of blood transfusion     Transfused after 2021    Seasonal allergies     Trichomonas infection     2021       Past Surgical History:  Denies PSH    Medications:  Current Outpatient Medications on File Prior to Visit   Medication Sig Dispense Refill    Prenatal Vit-Fe Fumarate-FA (PRENATAL VITAMINS) 28-0.8 MG TABS Take 1 tablet by mouth daily 60 tablet 4    vitamin B-6 (PYRIDOXINE) 50 MG tablet Take 1 tablet by mouth daily 30 tablet 3    aspirin (ASPIRIN CHILDRENS) 81 MG chewable tablet Take 1 tablet by mouth daily (Patient not taking: Reported on 2025) 60 tablet 5    Prenatal Vit-Fe Fumarate-FA (PRENATAL VITAMIN) 27-0.8 MG TABS Take 1 tablet by mouth daily May substitute with any prenatal vitamin covered by the patient's insurance. (Patient not taking: Reported on 2025) 30 tablet 12    doxyLAMINE succinate (UNISOM SLEEPTABS) 25 MG tablet Take 1 tablet by mouth nightly as needed for Sleep (Patient not taking: Reported on 2/3/2025) 30 tablet 4     No current facility-administered medications on file prior to visit.       Allergies:  Allergies as of 2025    (No Known Allergies)   NKA    Social History:  Social History     Socioeconomic History    Marital status: Single     Spouse name: Not on file    Number of children: Not on file    Years of education: Not on file    Highest education level: Not on file   Occupational History    Not on file   Tobacco Use    Smoking status: Never     Passive exposure: Past    Smokeless tobacco: Never   Vaping Use

## 2025-02-25 DIAGNOSIS — Z3A.16 16 WEEKS GESTATION OF PREGNANCY: ICD-10-CM

## 2025-02-25 LAB
AMPHET UR QL SCN: NEGATIVE
BARBITURATES UR QL SCN: NEGATIVE
BASOPHILS # BLD: <0.03 K/UL (ref 0–0.2)
BASOPHILS NFR BLD: 0 % (ref 0–2)
BENZODIAZ UR QL: NEGATIVE
C TRACH DNA SPEC QL PROBE+SIG AMP: NEGATIVE
CANDIDA SPECIES: NEGATIVE
CANNABINOIDS UR QL SCN: POSITIVE
COCAINE UR QL SCN: POSITIVE
CREAT UR-MCNC: 196 MG/DL (ref 28–217)
EOSINOPHIL # BLD: 0.14 K/UL (ref 0–0.44)
EOSINOPHILS RELATIVE PERCENT: 1 % (ref 1–4)
ERYTHROCYTE [DISTWIDTH] IN BLOOD BY AUTOMATED COUNT: 20 % (ref 11.8–14.4)
FENTANYL UR QL: NEGATIVE
GARDNERELLA VAGINALIS: POSITIVE
HBV SURFACE AG SERPL QL IA: NONREACTIVE
HCT VFR BLD AUTO: 34 % (ref 36.3–47.1)
HGB BLD-MCNC: 10.4 G/DL (ref 11.9–15.1)
IMM GRANULOCYTES # BLD AUTO: 0.08 K/UL (ref 0–0.3)
IMM GRANULOCYTES NFR BLD: 1 %
LYMPHOCYTES NFR BLD: 2.55 K/UL (ref 1.1–3.7)
LYMPHOCYTES RELATIVE PERCENT: 21 % (ref 24–43)
MCH RBC QN AUTO: 24 PG (ref 25.2–33.5)
MCHC RBC AUTO-ENTMCNC: 30.6 G/DL (ref 28.4–34.8)
MCV RBC AUTO: 78.5 FL (ref 82.6–102.9)
METHADONE UR QL: NEGATIVE
MICROORGANISM SPEC CULT: NORMAL
MONOCYTES NFR BLD: 0.84 K/UL (ref 0.1–1.2)
MONOCYTES NFR BLD: 7 % (ref 3–12)
N GONORRHOEA DNA SPEC QL PROBE+SIG AMP: NEGATIVE
NEUTROPHILS NFR BLD: 70 % (ref 36–65)
NEUTS SEG NFR BLD: 8.58 K/UL (ref 1.5–8.1)
NRBC BLD-RTO: 0 PER 100 WBC
OPIATES UR QL SCN: NEGATIVE
OXYCODONE UR QL SCN: NEGATIVE
PCP UR QL SCN: NEGATIVE
PLATELET # BLD AUTO: 273 K/UL (ref 138–453)
PMV BLD AUTO: 10.9 FL (ref 8.1–13.5)
RBC # BLD AUTO: 4.33 M/UL (ref 3.95–5.11)
RBC # BLD: ABNORMAL 10*6/UL
RUBV IGG SERPL QL IA: 122 IU/ML
SERVICE CMNT-IMP: NORMAL
SOURCE: ABNORMAL
SPECIMEN DESCRIPTION: NORMAL
SPECIMEN DESCRIPTION: NORMAL
T PALLIDUM AB SER QL IA: NONREACTIVE
TEST INFORMATION: ABNORMAL
TOTAL PROTEIN, URINE: 17 MG/DL
TRICHOMONAS: NEGATIVE
URINE TOTAL PROTEIN CREATININE RATIO: 0.09 (ref 0–0.2)
WBC OTHER # BLD: 12.2 K/UL (ref 3.5–11.3)

## 2025-02-27 LAB
AFP INTERPRETATION: NORMAL
AFP MOM: 1.18
AFP SPECIMEN: NORMAL
AFP: 43 NG/ML
DATE OF BIRTH: NORMAL
DATING METHOD: NORMAL
DETERMINED BY: NORMAL
DIABETIC: NEGATIVE
DONOR EGG?: NORMAL
DUE DATE: NORMAL
ESTIMATED DUE DATE: NORMAL
FAMILY HISTORY NTD: NEGATIVE
GESTATIONAL AGE: NORMAL
IN VITRO FERTILIZATION: NORMAL
INSULIN REQ DIABETES: NO
LAST MENSTRUAL PERIOD: NORMAL
MATERNAL AGE AT EDD: 23 YR
MATERNAL WEIGHT: 182
MONOCHORIONIC TWINS: NORMAL
NUMBER OF FETUSES: NORMAL
PATIENT WEIGHT UNITS: NORMAL
PATIENT WEIGHT: NORMAL
RACE (MATERNAL): NORMAL
RACE: NORMAL
REPEAT SPECIMEN?: NORMAL
SMOKING: NORMAL
SMOKING: NORMAL
VALPROIC/CARBAMAZEP: NORMAL
ZZ NTE CLEAN UP: HISTORY: NO

## 2025-02-28 DIAGNOSIS — B96.89 BACTERIAL VAGINOSIS IN PREGNANCY: Primary | ICD-10-CM

## 2025-02-28 DIAGNOSIS — O23.599 BACTERIAL VAGINOSIS IN PREGNANCY: Primary | ICD-10-CM

## 2025-02-28 RX ORDER — METRONIDAZOLE 7.5 MG/G
1 GEL VAGINAL DAILY
Qty: 70 G | Refills: 0 | Status: SHIPPED | OUTPATIENT
Start: 2025-02-28 | End: 2025-03-05

## 2025-03-01 LAB — CYTOLOGY REPORT: NORMAL

## 2025-03-13 DIAGNOSIS — O09.90 HIGH RISK PREGNANCY, ANTEPARTUM: ICD-10-CM

## 2025-03-15 ENCOUNTER — RESULTS FOLLOW-UP (OUTPATIENT)
Dept: OBGYN | Age: 25
End: 2025-03-15

## 2025-03-20 ENCOUNTER — ROUTINE PRENATAL (OUTPATIENT)
Dept: PERINATAL CARE | Age: 25
End: 2025-03-20
Payer: COMMERCIAL

## 2025-03-20 VITALS
HEIGHT: 63 IN | BODY MASS INDEX: 37.39 KG/M2 | HEART RATE: 112 BPM | DIASTOLIC BLOOD PRESSURE: 76 MMHG | RESPIRATION RATE: 16 BRPM | TEMPERATURE: 97.9 F | WEIGHT: 211 LBS | SYSTOLIC BLOOD PRESSURE: 124 MMHG

## 2025-03-20 DIAGNOSIS — Z87.59 PREVIOUS BABY WITH FETAL GROWTH RESTRICTION: ICD-10-CM

## 2025-03-20 DIAGNOSIS — Z34.90 SIXTH PREGNANCY: Primary | ICD-10-CM

## 2025-03-20 DIAGNOSIS — O09.90 HIGH RISK PREGNANCY, ANTEPARTUM: ICD-10-CM

## 2025-03-20 DIAGNOSIS — O10.919 HTN IN PREGNANCY, CHRONIC: ICD-10-CM

## 2025-03-20 DIAGNOSIS — Z3A.20 20 WEEKS GESTATION OF PREGNANCY: ICD-10-CM

## 2025-03-20 PROCEDURE — 76811 OB US DETAILED SNGL FETUS: CPT | Performed by: OBSTETRICS & GYNECOLOGY

## 2025-03-20 NOTE — PROGRESS NOTES
Please refer to attached ultrasound report for doctor's evaluation of the clinical information obtained by vital signs, ultrasound, and/or non-stress test along with management recommendation.    
placenta is anterior.        RECOMMENDATIONS:  Each of the recommendations were discussed with the patient:  1.  Growth ultrasounds every 4 weeks.  2.  Begin antepartum testing at 32 weeks gestation.  3.  Delivery timing between 37 and 39 weeks gestation depending on blood pressure control.  4.  Follow-up would be otherwise as clinically indicated.          The patient is to continue to follow with you in your office for ongoing obstetric care.        PLAN:    As noted above or sooner prn.    Sincerely,        Christiano Conley MD    I spent 25 minutes of direct contact time with the patient of which greater than 50% of the time was used to  the patient, discuss complications and problems related to her pregnancy, or coordinating her care in addition to the time spent interpreting her ultrasound. I answered all of her questions to her satisfaction.

## 2025-03-24 ENCOUNTER — ROUTINE PRENATAL (OUTPATIENT)
Dept: OBGYN | Age: 25
End: 2025-03-24

## 2025-03-24 VITALS
WEIGHT: 208 LBS | BODY MASS INDEX: 36.85 KG/M2 | DIASTOLIC BLOOD PRESSURE: 70 MMHG | SYSTOLIC BLOOD PRESSURE: 112 MMHG | HEART RATE: 100 BPM

## 2025-03-24 DIAGNOSIS — Z3A.20 20 WEEKS GESTATION OF PREGNANCY: ICD-10-CM

## 2025-03-24 DIAGNOSIS — O09.92 HIGH-RISK PREGNANCY IN SECOND TRIMESTER: Primary | ICD-10-CM

## 2025-03-24 PROBLEM — F14.90 COCAINE USE: Status: ACTIVE | Noted: 2025-03-24

## 2025-03-24 PROBLEM — N83.201 RIGHT OVARIAN CYST: Status: ACTIVE | Noted: 2025-03-24

## 2025-03-24 PROBLEM — O09.90 HIGH RISK PREGNANCY, ANTEPARTUM: Status: RESOLVED | Noted: 2025-02-03 | Resolved: 2025-03-24

## 2025-03-24 PROBLEM — Z34.90 SIXTH PREGNANCY: Status: RESOLVED | Noted: 2025-03-20 | Resolved: 2025-03-24

## 2025-03-24 PROBLEM — Z13.31 NEGATIVE DEPRESSION SCREENING: Status: RESOLVED | Noted: 2025-02-03 | Resolved: 2025-03-24

## 2025-03-24 PROCEDURE — 0502F SUBSEQUENT PRENATAL CARE: CPT | Performed by: STUDENT IN AN ORGANIZED HEALTH CARE EDUCATION/TRAINING PROGRAM

## 2025-03-24 NOTE — PROGRESS NOTES
Prenatal Visit    Hodan Renteria is a 24 y.o. female  at 20w5d    The patient was seen and evaluated. She has no complaints today. She reports positive fetal movements. She denies contractions, vaginal bleeding and leakage of fluid. She currently denies signs or symptoms of pre-eclampsia including headache, vision changes, RUQ pain.      The problem list reflects the active issues addressed during today's visit    Vitals:    BP: 112/70  Weight - Scale: 94.3 kg (208 lb)  Pulse: 100  Patient Position: Sitting  Albumin:  (Pt unable to void)  Fundal Height (cm): 21 cm  Fetal HR: 157  Movement: Present     PRENATAL LAB RESULTS:   Blood Type/Rh: O pos  Antibody Screen: negative  Hemoglobin, Hematocrit, Platelets: Hgb 10.4/Hct 34.0/Plt 273  Rubella: immune  T. Pallidum, IgG: non-reactive  Hepatitis B Surface Antigen: non-reactive   Hepatitis C Antibody: non-reactive   HIV: non-reactive   Sickle Cell Screen: negative  Gonorrhea: negative  Chlamydia: negative  Urine culture: negative, date: 25    1 hour Glucose Tolerance Test: not done yet    Group B Strep: not done yet   Cystic Fibrosis Screen: negative  First Trimester Screen: not done  MSAFP: negative  Non-Invasive Prenatal Testing: low risk for aneuploidy  Anatomy US (3/20/25): anterior placenta, 3VC, female, normal anatomy, maternal right ovarian simple cyst      Assessment & Plan:  Hodan Renteria is a 24 y.o. female  at 20w5d   - Initial prenatal labs completed and reviewed   - Will order 28 week labs at next visit   - msAFP negative   - NIPT low risk   - Carrier screen neg   - Anatomy US completed and wnl   - Next MFM appt 25 for interval growth scan due to history of FGR   - Continue PNV and ASA    Cocaine use   - UDS+ on 25   - Reviewed with patient not recommended in pregnancy and offered support today. Patient adamantly denies cocaine use. She reports that her mother uses cocaine and she kicked her mom out of her house recently

## 2025-03-31 ENCOUNTER — TELEPHONE (OUTPATIENT)
Dept: OBGYN | Age: 25
End: 2025-03-31

## 2025-03-31 NOTE — TELEPHONE ENCOUNTER
Received fax from [employer] that they had been unable to reach patient regarding At Risk for PreE service and would be unable to continue to reach out to her. Called patient to discuss. Patient states she does want to contact [employer] to begin service. [employer] phone number provided, patient encouraged to contact. Patient voiced understanding.

## 2025-04-01 NOTE — PROGRESS NOTES
Attending Physician Statement  I have discussed the care of Hodan Renteria, including pertinent history and exam findings, with the resident. I have reviewed the key elements of all parts of the encounter with the resident.  I agree with the assessment, plan and orders as documented by the resident.  (GE Modifier)    Natividad Navas DO  [facility]  4/1/2025, 6:01 PM

## 2025-04-08 ENCOUNTER — APPOINTMENT (OUTPATIENT)
Dept: GENERAL RADIOLOGY | Age: 25
End: 2025-04-08
Payer: COMMERCIAL

## 2025-04-08 ENCOUNTER — HOSPITAL ENCOUNTER (EMERGENCY)
Age: 25
Discharge: HOME | End: 2025-04-09
Attending: EMERGENCY MEDICINE
Payer: COMMERCIAL

## 2025-04-08 VITALS
TEMPERATURE: 99.6 F | OXYGEN SATURATION: 100 % | DIASTOLIC BLOOD PRESSURE: 75 MMHG | HEART RATE: 113 BPM | SYSTOLIC BLOOD PRESSURE: 133 MMHG | RESPIRATION RATE: 23 BRPM

## 2025-04-08 DIAGNOSIS — U07.1 COVID: Primary | ICD-10-CM

## 2025-04-08 LAB
ALBUMIN SERPL-MCNC: 3.6 G/DL (ref 3.5–5.2)
ALBUMIN/GLOB SERPL: 1 {RATIO} (ref 1–2.5)
ALP SERPL-CCNC: 109 U/L (ref 35–104)
ALT SERPL-CCNC: 23 U/L (ref 10–35)
ANION GAP SERPL CALCULATED.3IONS-SCNC: 15 MMOL/L (ref 9–16)
AST SERPL-CCNC: 30 U/L (ref 10–35)
BACTERIA URNS QL MICRO: ABNORMAL
BASOPHILS # BLD: 0 K/UL (ref 0–0.2)
BASOPHILS NFR BLD: 0 % (ref 0–2)
BILIRUB SERPL-MCNC: <0.2 MG/DL (ref 0–1.2)
BILIRUB UR QL STRIP: NEGATIVE
BUN SERPL-MCNC: 6 MG/DL (ref 6–20)
CALCIUM SERPL-MCNC: 9.1 MG/DL (ref 8.6–10.4)
CASTS #/AREA URNS LPF: ABNORMAL /LPF (ref 0–8)
CHLORIDE SERPL-SCNC: 99 MMOL/L (ref 98–107)
CLARITY UR: CLEAR
CO2 SERPL-SCNC: 19 MMOL/L (ref 20–31)
COLOR UR: YELLOW
CREAT SERPL-MCNC: 0.5 MG/DL (ref 0.6–0.9)
D DIMER PPP FEU-MCNC: 0.74 UG/ML FEU (ref 0–0.57)
EOSINOPHIL # BLD: 0 K/UL (ref 0–0.44)
EOSINOPHILS RELATIVE PERCENT: 0 % (ref 1–4)
EPI CELLS #/AREA URNS HPF: ABNORMAL /HPF (ref 0–5)
ERYTHROCYTE [DISTWIDTH] IN BLOOD BY AUTOMATED COUNT: 14.6 % (ref 11.8–14.4)
FLUAV RNA RESP QL NAA+PROBE: NOT DETECTED
FLUBV RNA RESP QL NAA+PROBE: NOT DETECTED
GFR, ESTIMATED: >90 ML/MIN/1.73M2
GLUCOSE SERPL-MCNC: 97 MG/DL (ref 74–99)
GLUCOSE UR STRIP-MCNC: NEGATIVE MG/DL
HCT VFR BLD AUTO: 31.1 % (ref 36.3–47.1)
HGB BLD-MCNC: 10 G/DL (ref 11.9–15.1)
HGB UR QL STRIP.AUTO: NEGATIVE
IMM GRANULOCYTES # BLD AUTO: 0.1 K/UL (ref 0–0.3)
IMM GRANULOCYTES NFR BLD: 1 %
KETONES UR STRIP-MCNC: ABNORMAL MG/DL
LEUKOCYTE ESTERASE UR QL STRIP: NEGATIVE
LYMPHOCYTES NFR BLD: 0.67 K/UL (ref 1.1–3.7)
LYMPHOCYTES RELATIVE PERCENT: 7 % (ref 24–43)
MCH RBC QN AUTO: 25.4 PG (ref 25.2–33.5)
MCHC RBC AUTO-ENTMCNC: 32.2 G/DL (ref 28.4–34.8)
MCV RBC AUTO: 78.9 FL (ref 82.6–102.9)
MONOCYTES NFR BLD: 0.67 K/UL (ref 0.1–1.2)
MONOCYTES NFR BLD: 7 % (ref 3–12)
MORPHOLOGY: ABNORMAL
MORPHOLOGY: ABNORMAL
NEUTROPHILS NFR BLD: 85 % (ref 36–65)
NEUTS SEG NFR BLD: 8.06 K/UL (ref 1.5–8.1)
NITRITE UR QL STRIP: NEGATIVE
NRBC BLD-RTO: 0 PER 100 WBC
PH UR STRIP: 7 [PH] (ref 5–8)
PLATELET # BLD AUTO: 203 K/UL (ref 138–453)
PMV BLD AUTO: 10.3 FL (ref 8.1–13.5)
POTASSIUM SERPL-SCNC: 3.7 MMOL/L (ref 3.7–5.3)
PROT SERPL-MCNC: 7.2 G/DL (ref 6.6–8.7)
PROT UR STRIP-MCNC: ABNORMAL MG/DL
RBC # BLD AUTO: 3.94 M/UL (ref 3.95–5.11)
RBC #/AREA URNS HPF: ABNORMAL /HPF (ref 0–4)
SARS-COV-2 RNA RESP QL NAA+PROBE: DETECTED
SODIUM SERPL-SCNC: 133 MMOL/L (ref 136–145)
SOURCE: ABNORMAL
SP GR UR STRIP: 1.03 (ref 1–1.03)
SPECIMEN DESCRIPTION: ABNORMAL
TROPONIN I SERPL HS-MCNC: <6 NG/L (ref 0–14)
UROBILINOGEN UR STRIP-ACNC: NORMAL EU/DL (ref 0–1)
WBC #/AREA URNS HPF: ABNORMAL /HPF (ref 0–5)
WBC OTHER # BLD: 9.5 K/UL (ref 3.5–11.3)

## 2025-04-08 PROCEDURE — 87636 SARSCOV2 & INF A&B AMP PRB: CPT

## 2025-04-08 PROCEDURE — 84484 ASSAY OF TROPONIN QUANT: CPT

## 2025-04-08 PROCEDURE — 99285 EMERGENCY DEPT VISIT HI MDM: CPT | Performed by: EMERGENCY MEDICINE

## 2025-04-08 PROCEDURE — 2580000003 HC RX 258: Performed by: EMERGENCY MEDICINE

## 2025-04-08 PROCEDURE — 71045 X-RAY EXAM CHEST 1 VIEW: CPT

## 2025-04-08 PROCEDURE — 85379 FIBRIN DEGRADATION QUANT: CPT

## 2025-04-08 PROCEDURE — 85025 COMPLETE CBC W/AUTO DIFF WBC: CPT

## 2025-04-08 PROCEDURE — 80053 COMPREHEN METABOLIC PANEL: CPT

## 2025-04-08 PROCEDURE — 96374 THER/PROPH/DIAG INJ IV PUSH: CPT | Performed by: EMERGENCY MEDICINE

## 2025-04-08 PROCEDURE — 6360000002 HC RX W HCPCS: Performed by: EMERGENCY MEDICINE

## 2025-04-08 PROCEDURE — 81001 URINALYSIS AUTO W/SCOPE: CPT

## 2025-04-08 PROCEDURE — 96361 HYDRATE IV INFUSION ADD-ON: CPT | Performed by: EMERGENCY MEDICINE

## 2025-04-08 PROCEDURE — 6370000000 HC RX 637 (ALT 250 FOR IP): Performed by: EMERGENCY MEDICINE

## 2025-04-08 PROCEDURE — 87086 URINE CULTURE/COLONY COUNT: CPT

## 2025-04-08 PROCEDURE — 93005 ELECTROCARDIOGRAM TRACING: CPT | Performed by: EMERGENCY MEDICINE

## 2025-04-08 PROCEDURE — 86403 PARTICLE AGGLUT ANTBDY SCRN: CPT

## 2025-04-08 RX ORDER — 0.9 % SODIUM CHLORIDE 0.9 %
1000 INTRAVENOUS SOLUTION INTRAVENOUS ONCE
Status: COMPLETED | OUTPATIENT
Start: 2025-04-08 | End: 2025-04-08

## 2025-04-08 RX ORDER — ONDANSETRON 2 MG/ML
4 INJECTION INTRAMUSCULAR; INTRAVENOUS ONCE
Status: COMPLETED | OUTPATIENT
Start: 2025-04-08 | End: 2025-04-08

## 2025-04-08 RX ORDER — ACETAMINOPHEN 500 MG
500 TABLET ORAL ONCE
Status: COMPLETED | OUTPATIENT
Start: 2025-04-08 | End: 2025-04-08

## 2025-04-08 RX ORDER — ACETAMINOPHEN 500 MG
1000 TABLET ORAL ONCE
Status: DISCONTINUED | OUTPATIENT
Start: 2025-04-08 | End: 2025-04-08

## 2025-04-08 RX ADMIN — ACETAMINOPHEN 500 MG: 500 TABLET, FILM COATED ORAL at 21:41

## 2025-04-08 RX ADMIN — SODIUM CHLORIDE 1000 ML: 0.9 INJECTION, SOLUTION INTRAVENOUS at 21:40

## 2025-04-08 RX ADMIN — ONDANSETRON 4 MG: 2 INJECTION, SOLUTION INTRAMUSCULAR; INTRAVENOUS at 21:41

## 2025-04-08 ASSESSMENT — ENCOUNTER SYMPTOMS
COUGH: 1
ABDOMINAL PAIN: 0
VOMITING: 1
DIARRHEA: 0
NAUSEA: 1
SHORTNESS OF BREATH: 1

## 2025-04-08 ASSESSMENT — LIFESTYLE VARIABLES: HOW OFTEN DO YOU HAVE A DRINK CONTAINING ALCOHOL: NEVER

## 2025-04-08 ASSESSMENT — PAIN SCALES - GENERAL: PAINLEVEL_OUTOF10: 9

## 2025-04-09 LAB
MICROORGANISM SPEC CULT: ABNORMAL
SERVICE CMNT-IMP: ABNORMAL
SPECIMEN DESCRIPTION: ABNORMAL

## 2025-04-09 PROCEDURE — 6370000000 HC RX 637 (ALT 250 FOR IP): Performed by: EMERGENCY MEDICINE

## 2025-04-09 RX ADMIN — NIRMATRELVIR AND RITONAVIR 3 TABLET: KIT at 00:46

## 2025-04-09 NOTE — DISCHARGE INSTRUCTIONS
You were seen today for cough, nausea, fever. You tested positive for COVID. Your heart labwork was normal. I recommend you continue to use tylenol and motrin for fever and body aches. Continue to stay hydrated, drink a lot of water and Gatorade. I also recommend honey, cough drops, or warm tea for sore throat and warm bath/shower for congestion. Please follow up with PCP if your symptoms worsen.     If you notice any concerning symptoms please return to the ER immediately. These can include but are not limited to: fevers, chills, shortness of breath, vomiting, weakness of the extremities, changes in your mental status, numbness, pale extremities, or chest pain.     Medications: Continue taking your home medications as previously directed. For pain You may take tylenol 1,000mg by mouth every 6 hours as needed for pain or fever. Do not exceed 4,000mg per day. If you have liver disease don't take tylenol.     I have prescribed you zofran for nausea.     Follow up: Please follow up with your primary care doctor within one week. Please follow up with your OBGYN at your normal appointment.

## 2025-04-09 NOTE — ED PROVIDER NOTES
[facility]  Emergency Department  Faculty Attestation     I performed a history and physical examination of the patient and discussed management with the resident. I reviewed the resident’s note and agree with the documented findings and plan of care. Any areas of disagreement are noted on the chart. I was personally present for the key portions of any procedures. I have documented in the chart those procedures where I was not present during the key portions. I have reviewed the emergency nurses triage note. I agree with the chief complaint, past medical history, past surgical history, allergies, medications, social and family history as documented unless otherwise noted below.    For Physician Assistant/ Nurse Practitioner cases/documentation I have personally evaluated this patient and have completed at least one if not all key elements of the E/M (history, physical exam, and MDM). Additional findings are as noted.    Preliminary note started at 9:30 PM EDT    Primary Care Physician:  Lucie Whitman, JAM - CNP    Screenings:  [unfilled]    CHIEF COMPLAINT       Chief Complaint   Patient presents with    Headache     6 months pregnant, denies hx of gestational HTN    Cough    Shortness of Breath       RECENT VITALS:   /74   Pulse (!) 110   Temp 99.6 °F (37.6 °C) (Oral)   Resp 16   LMP  (LMP Unknown)   SpO2 100%     LABS:  Labs Reviewed   COVID-19 & INFLUENZA COMBO   CULTURE, URINE   CBC WITH AUTO DIFFERENTIAL   COMPREHENSIVE METABOLIC PANEL   TROPONIN   D-DIMER, QUANTITATIVE   URINALYSIS WITH MICROSCOPIC       Radiology  XR CHEST PORTABLE    (Results Pending)         EKG:  EKG Interpretation    Interpreted by me    Rhythm: normal sinus   Rate: Tachycardia  Axis: normal  Ectopy: none  Conduction: normal  ST Segments: no acute change  T Waves: no acute change  Q Waves: none    Clinical Impression: Sinus tachycardia, no acute ischemic changes    Attending Physician 
mouth nightly  Patient not taking: Reported on 3/24/2025 2/24/25   QuintonElida DO   aspirin (ASPIRIN CHILDRENS) 81 MG chewable tablet Take 1 tablet by mouth daily  Patient not taking: Reported on 2/24/2025 2/3/25   Natividad Navas DO   Prenatal Vit-Fe Fumarate-FA (PRENATAL VITAMINS) 28-0.8 MG TABS Take 1 tablet by mouth daily 1/17/25   Natividad Navas DO   vitamin B-6 (PYRIDOXINE) 50 MG tablet Take 1 tablet by mouth daily 1/17/25   Natividad Navas DO         REVIEW OF SYSTEMS       Review of Systems   Constitutional:  Positive for chills, fatigue and fever.   Respiratory:  Positive for cough and shortness of breath.    Cardiovascular:  Negative for chest pain.   Gastrointestinal:  Positive for nausea and vomiting. Negative for abdominal pain and diarrhea.   Genitourinary:  Negative for dysuria, vaginal bleeding and vaginal discharge.       PHYSICAL EXAM      INITIAL VITALS:   /75   Pulse (!) 113   Temp 99.6 °F (37.6 °C) (Oral)   Resp 23   LMP  (LMP Unknown)   SpO2 100%     Physical Exam  Constitutional:       General: She is not in acute distress.  HENT:      Head: Normocephalic and atraumatic.   Eyes:      Extraocular Movements: Extraocular movements intact.      Pupils: Pupils are equal, round, and reactive to light.   Cardiovascular:      Rate and Rhythm: Normal rate and regular rhythm.      Heart sounds: Normal heart sounds.   Pulmonary:      Breath sounds: Normal breath sounds.   Abdominal:      General: There is no distension.      Palpations: Abdomen is soft.      Tenderness: There is no abdominal tenderness. There is no guarding.      Comments: Gravid   Musculoskeletal:         General: Normal range of motion.   Skin:     General: Skin is warm.   Neurological:      General: No focal deficit present.      Mental Status: She is alert.   Psychiatric:         Mood and Affect: Mood normal.           DDX/DIAGNOSTIC RESULTS / EMERGENCY DEPARTMENT COURSE / MDM     Medical Decision

## 2025-04-09 NOTE — ED NOTES
Pt reports to the ED with complaints of a HA. Pt also admits to SOB and cough x1 week now once writer went into the room. Pt states she is 6 months pregnant, denies any contraction, leakage of fluid, vaginal bleeding or decreased fetal movement. Pt states she has been following with OB. 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 Pt states OB is unsure why she has had so many miscarriages. Pt denies any complications during pregnancy including gestational HTN or DM. Pt does not have any other complaints at this time. Pt is A&O x4 and speaking in complete sentences. Pt appears anxious at this time but NAD noted. EKG obtained. Pt placed on full cardiac monitor. Care ongoing

## 2025-04-09 NOTE — ED NOTES
The following labs were labeled with appropriate pt sticker and tubed to lab:     [x] Blue     [x] Lavender   [] on ice  [x] Green/yellow  [] Green/black [] on ice  [] [place]  [] on ice  [x] Yellow  [] Red  [] Pink  [] Type/ Screen  [] ABG  [] VBG    [x] COVID-19 swab    [x] Rapid  [] PCR  [x] Flu swab  [] Peds Viral Panel     [] Urine Sample  [] Fecal Sample  [] Pelvic Cultures  [] Blood Cultures  [] X 2  [] STREP Cultures  [] Wound Cultures

## 2025-04-09 NOTE — CONSULTS
Obstetric/Gynecology Resident Telephone Consultation Note    OBGYN resident discussed plan of care with ED resident. Patient NOT evaluated by OBGYN team     Patient is  at 22w6d presenting to the ED with nausea, vomiting, fatigue. COVID+.Patient denies obstetric complaints.  Patient received fluid resuscitation.     BSUS performed by ED resident revealing adequate fetal movement and FHR 160s.     Vitals:    25 2230 25 2237 25 2315 25 2320   BP:  129/67 133/75    Pulse: (!) 113   (!) 113   Resp: 20   23   Temp:       TempSrc:       SpO2: 100%   100%       Patient requesting discharge and declined OBGYN evaluation.    Discussed given gestational age and reassuring BSUS with FHT, patient can be discharged from ED without formal NST.     Discussed importance of return precautions including chest pain, SOB, worsening of sxs, vaginal bleeding, leakage of fluid, contractions, decreased FM.     Mi San DO  OB/GYN Resident, PGY3  [place], [place]  2025, 11:32 PM

## 2025-04-11 LAB
EKG ATRIAL RATE: 108 BPM
EKG P AXIS: 49 DEGREES
EKG P-R INTERVAL: 140 MS
EKG Q-T INTERVAL: 326 MS
EKG QRS DURATION: 90 MS
EKG QTC CALCULATION (BAZETT): 436 MS
EKG R AXIS: 58 DEGREES
EKG T AXIS: 24 DEGREES
EKG VENTRICULAR RATE: 108 BPM

## 2025-04-22 PROBLEM — O23.42 GROUP B STREPTOCOCCUS URINARY TRACT INFECTION AFFECTING PREGNANCY IN SECOND TRIMESTER: Status: ACTIVE | Noted: 2025-04-22

## 2025-04-22 PROBLEM — B95.1 GROUP B STREPTOCOCCUS URINARY TRACT INFECTION AFFECTING PREGNANCY IN SECOND TRIMESTER: Status: ACTIVE | Noted: 2025-04-22

## 2025-04-24 ENCOUNTER — ROUTINE PRENATAL (OUTPATIENT)
Dept: PERINATAL CARE | Age: 25
End: 2025-04-24
Payer: COMMERCIAL

## 2025-04-24 VITALS
DIASTOLIC BLOOD PRESSURE: 80 MMHG | WEIGHT: 210 LBS | SYSTOLIC BLOOD PRESSURE: 129 MMHG | TEMPERATURE: 97 F | RESPIRATION RATE: 16 BRPM | HEIGHT: 63 IN | HEART RATE: 108 BPM | BODY MASS INDEX: 37.21 KG/M2

## 2025-04-24 DIAGNOSIS — Z3A.25 25 WEEKS GESTATION OF PREGNANCY: ICD-10-CM

## 2025-04-24 DIAGNOSIS — N96 HISTORY OF RECURRENT MISCARRIAGES: ICD-10-CM

## 2025-04-24 DIAGNOSIS — O36.5920 POOR FETAL GROWTH AFFECTING MANAGEMENT OF MOTHER IN SECOND TRIMESTER, SINGLE OR UNSPECIFIED FETUS: Primary | ICD-10-CM

## 2025-04-24 DIAGNOSIS — O09.90 HIGH RISK PREGNANCY, ANTEPARTUM: ICD-10-CM

## 2025-04-24 DIAGNOSIS — O10.919 HTN IN PREGNANCY, CHRONIC: ICD-10-CM

## 2025-04-24 DIAGNOSIS — Z34.90 SIXTH PREGNANCY: ICD-10-CM

## 2025-04-24 DIAGNOSIS — Z87.59 PREVIOUS BABY WITH FETAL GROWTH RESTRICTION: ICD-10-CM

## 2025-04-24 PROCEDURE — 76816 OB US FOLLOW-UP PER FETUS: CPT | Performed by: OBSTETRICS & GYNECOLOGY

## 2025-04-24 PROCEDURE — 76820 UMBILICAL ARTERY ECHO: CPT | Performed by: OBSTETRICS & GYNECOLOGY

## 2025-04-24 NOTE — PROGRESS NOTES
[facility], [facility] MATERNAL FETAL MED  [address]  [place] [address]  Dept: [phone]     2025    RE:  Hodan Renteria     : 2000   AGE: 24 y.o.     Dear Dr. Navas,    Thank you for allowing me to see Hodan Renteria.  As I'm sure you will recall, Hodan Renteria is a 24 y.o. [id number] LMP recorded (lmp unknown). Patient is pregnant. Estimated Date of Delivery: 25 at 25w1d seen in our office today for the following:    REASON FOR VISIT: Growth     Patient Active Problem List    Diagnosis Date Noted    Hx recurrent SAB 2022    Exposure to genital herpes (current partner) 2022    Poor fetal growth affecting management of mother in second trimester 2025    Group B Streptococcus urinary tract infection affecting pregnancy in second trimester 2025    Cocaine use 2025    Right ovarian simple cyst 2025    Sixth pregnancy 2025    THC Use 2025    Pregravid BMI 30.12 2025    Patient declines all vaccines in pregnancy 2025    TAB x1 10/14/2023    Dysmenorrhea 10/14/2023    Hx FGR (G4) 2022    cHTN (no meds) 2022        PAST HISTORY:  OB History    Para Term  AB Living   6 1 1 0 4 1   SAB IAB Ectopic Molar Multiple Live Births   3 1 0 0 0 1      # Outcome Date GA Lbr Moiz/2nd Weight Sex Type Anes PTL Lv   6 Current            5 IAB 23 10w0d    TAB      4 Term 22 37w6d 03:46 / 00:44 2.565 kg (5 lb 10.5 oz) M Vag-Spont EPI N ELVIA   3 SAB      SAB      2 SAB      SAB      1 SAB      SAB         Obstetric Comments   G1-G6: FOB#1      G1, G2, G3: No intervention   G4:    G5: TAB, medication   G6:          MEDICAL:  Past Medical History:   Diagnosis Date    Chlamydia     ,     cHTN (no meds) 2022    Closed fracture of phalanx of middle finger 2016    Gonorrhea 2021    Herpes simplex virus (HSV) infection

## 2025-04-30 ENCOUNTER — TELEPHONE (OUTPATIENT)
Age: 25
End: 2025-04-30

## 2025-04-30 NOTE — TELEPHONE ENCOUNTER
Patient no showed for 04/30/25 appointment at [facility] OB[facility] office.  Writer left a voice message and sent a letter for patient to call the office to reschedule appointment.

## 2025-06-10 ENCOUNTER — ROUTINE PRENATAL (OUTPATIENT)
Age: 25
End: 2025-06-10
Payer: COMMERCIAL

## 2025-06-10 VITALS
WEIGHT: 210 LBS | BODY MASS INDEX: 37.21 KG/M2 | SYSTOLIC BLOOD PRESSURE: 130 MMHG | HEIGHT: 63 IN | RESPIRATION RATE: 16 BRPM | HEART RATE: 100 BPM | DIASTOLIC BLOOD PRESSURE: 70 MMHG | TEMPERATURE: 98.2 F

## 2025-06-10 DIAGNOSIS — N96 HISTORY OF RECURRENT MISCARRIAGES: ICD-10-CM

## 2025-06-10 DIAGNOSIS — Z87.59 PREVIOUS BABY WITH FETAL GROWTH RESTRICTION: ICD-10-CM

## 2025-06-10 DIAGNOSIS — Z34.90 SIXTH PREGNANCY: ICD-10-CM

## 2025-06-10 DIAGNOSIS — F12.91 HISTORY OF MARIJUANA USE: ICD-10-CM

## 2025-06-10 DIAGNOSIS — Z3A.31 31 WEEKS GESTATION OF PREGNANCY: Primary | ICD-10-CM

## 2025-06-10 PROBLEM — O36.5920 POOR FETAL GROWTH AFFECTING MANAGEMENT OF MOTHER IN SECOND TRIMESTER: Status: RESOLVED | Noted: 2025-04-24 | Resolved: 2025-06-10

## 2025-06-10 PROCEDURE — 76805 OB US >/= 14 WKS SNGL FETUS: CPT | Performed by: OBSTETRICS & GYNECOLOGY

## 2025-06-10 PROCEDURE — 76819 FETAL BIOPHYS PROFIL W/O NST: CPT | Performed by: OBSTETRICS & GYNECOLOGY

## 2025-06-10 PROCEDURE — 99999 PR OFFICE/OUTPT VISIT,PROCEDURE ONLY: CPT | Performed by: OBSTETRICS & GYNECOLOGY

## 2025-06-10 NOTE — PROGRESS NOTES
Discussed importance to keep all appts. with prim OB- and instructed to call prim OB office for a prenatal visit.  Pt verbalized understanding  
Please refer to attached ultrasound report for doctor's evaluation of the clinical information obtained by vital signs, ultrasound, and/or non-stress test along with management recommendation.  
    Cerebral Aneurysm Maternal Grandmother     Heart Attack Maternal Grandfather     Stroke Maternal Grandfather     Diabetes Maternal Grandfather     Pancreatic Cancer Father     Colon Cancer Father     No Known Problems Mother     No Known Problems Brother     No Known Problems Sister     Heart Disease Half-Brother           PHYSICAL EXAMINATION:  /70   Pulse 100   Temp 98.2 °F (36.8 °C)   Resp 16   Ht 1.6 m (5' 3\")   Wt 95.3 kg (210 lb)   LMP  (LMP Unknown)   Body mass index is 37.2 kg/m².    Urine dipstick:  No results found for this visit on 06/10/25.     A/an Growth  ultrasound was done in our office today. Please refer to the enclosed copy of the ultrasound report for further information.    Discussion:  1. Nunez fetus in a cephalic presentation. Fetal motion and cardiac motion is seen and appears normal.  2. The baby is appropriate size for gestational age and is consistent with early ultrasound dating.  3. No obvious anomalies are noted. We were not able to complete the repeat anatomical survey today due to maternal body habitus.  4. The placenta is anterior. The amniotic fluid is normal.  5. Biophysical profile is 8/8.  6. Right ovarian cyst seen on today's scan with dimensions above without internal septations, internal debris, external excrescences, etc. (likely consistent with a simple ovarian cyst).     IMPRESSION:  1. Single intrauterine gestation at 31+ weeks with appropriate interval growth the baby is now AGA.  2.  No obvious fetal anomalies are noted.  The fetus is in a vertex presentation.  3.  The amniotic fluid volume is normal and the placenta is anterior.    RECOMMENDATIONS:  Each of the recommendations were discussed with the patient:  1.  Follow-up growth in 4 weeks.  2.  Follow-up would be otherwise as clinically indicated    The patient is to continue to follow with you in your office for ongoing obstetric care.     PLAN:    As noted above or sooner

## 2025-06-12 ENCOUNTER — TELEPHONE (OUTPATIENT)
Age: 25
End: 2025-06-12

## 2025-06-12 NOTE — TELEPHONE ENCOUNTER
Patient stated she has not been in for care in a while and is requesting a call regarding what she needs and possibly scheduling a prenatal visit. Patient can be reached at [phone] . Okay to leave a message.

## 2025-06-19 ENCOUNTER — ROUTINE PRENATAL (OUTPATIENT)
Age: 25
End: 2025-06-19
Payer: COMMERCIAL

## 2025-06-19 ENCOUNTER — FOLLOWUP TELEPHONE ENCOUNTER (OUTPATIENT)
Age: 25
End: 2025-06-19

## 2025-06-19 VITALS
DIASTOLIC BLOOD PRESSURE: 86 MMHG | BODY MASS INDEX: 37.91 KG/M2 | WEIGHT: 214 LBS | HEART RATE: 90 BPM | SYSTOLIC BLOOD PRESSURE: 138 MMHG

## 2025-06-19 DIAGNOSIS — O09.93 HIGH-RISK PREGNANCY IN THIRD TRIMESTER: Primary | ICD-10-CM

## 2025-06-19 DIAGNOSIS — Z3A.33 33 WEEKS GESTATION OF PREGNANCY: ICD-10-CM

## 2025-06-19 DIAGNOSIS — O10.919 HTN IN PREGNANCY, CHRONIC: ICD-10-CM

## 2025-06-19 PROBLEM — Z3A.31 31 WEEKS GESTATION OF PREGNANCY: Status: RESOLVED | Noted: 2025-06-10 | Resolved: 2025-06-19

## 2025-06-19 PROCEDURE — G8419 CALC BMI OUT NRM PARAM NOF/U: HCPCS

## 2025-06-19 PROCEDURE — 90715 TDAP VACCINE 7 YRS/> IM: CPT | Performed by: STUDENT IN AN ORGANIZED HEALTH CARE EDUCATION/TRAINING PROGRAM

## 2025-06-19 PROCEDURE — 1036F TOBACCO NON-USER: CPT

## 2025-06-19 PROCEDURE — 59025 FETAL NON-STRESS TEST: CPT

## 2025-06-19 PROCEDURE — 90471 IMMUNIZATION ADMIN: CPT | Performed by: STUDENT IN AN ORGANIZED HEALTH CARE EDUCATION/TRAINING PROGRAM

## 2025-06-19 PROCEDURE — G8427 DOCREV CUR MEDS BY ELIG CLIN: HCPCS

## 2025-06-19 PROCEDURE — 0502F SUBSEQUENT PRENATAL CARE: CPT

## 2025-06-19 RX ORDER — NIFEDIPINE 30 MG/1
30 TABLET, EXTENDED RELEASE ORAL DAILY
Qty: 30 TABLET | Refills: 0 | Status: SHIPPED | OUTPATIENT
Start: 2025-06-19

## 2025-06-19 NOTE — PROGRESS NOTES
Prenatal Visit    Hodan Renteria is a 24 y.o. female  at 33w1d    The patient was seen and evaluated. She has no complaints. She reports positive fetal movements. She denies contractions, vaginal bleeding and leakage of fluid. Signs and symptoms of labor and pre-eclampsia were reviewed with the patient in detail. She is to report any of these if they occur. She currently denies any of these.    The problem list reflects the active issues addressed during today's visit    Vitals:  BP: 138/86  Weight - Scale: 97.1 kg (214 lb)  Pulse: 90  Albumin: Negative  Glucose: Negative  Fundal Height (cm): 32 cm  Fetal HR: 140  Movement: Present     Fetal Heart Monitor:  Baseline Heart Rate 140, moderate variability, present accelerations, absent decelerations  [place]: contractions, none  NST REACTIVE    PRENATAL LAB RESULTS:   Blood Type/Rh: O pos  Antibody Screen: negative  Hemoglobin, Hematocrit, Platelets: Hgb 10.4/Hct 34.0/Plt 273  Rubella: immune  T. Pallidum, IgG: non-reactive  Hepatitis B Surface Antigen: non-reactive   Hepatitis C Antibody: non-reactive   HIV: non-reactive   Sickle Cell Screen: negative  Gonorrhea: negative  Chlamydia: negative  Urine culture: negative, date: 25    Positive GBS, date: 25     1 hour Glucose Tolerance Test: not done yet     Group B Strep: bacteriuria   Cystic Fibrosis Screen: negative  First Trimester Screen: not done  MSAFP: negative  Non-Invasive Prenatal Testing: low risk for aneuploidy  Anatomy US (3/20/25): anterior placenta, 3VC, female, normal anatomy, maternal right ovarian simple cyst    Assessment & Plan:  Hodan Renteria is a 24 y.o. female  at 33w1d   - 28 week labs ordered   - Tdap vaccination: is requesting today, given    - Rhogam: not indicated   - RSV vaccination (32-36 weeks): The patient was counseled on benefits to her baby if she receives the Pfizer RSV vaccine (Abrysvo) during pregnancy. She was informed that this vaccination is FDA approved

## 2025-06-20 ENCOUNTER — HOSPITAL ENCOUNTER (OUTPATIENT)
Age: 25
Setting detail: SPECIMEN
Discharge: HOME | End: 2025-06-20

## 2025-06-20 DIAGNOSIS — O10.919 HTN IN PREGNANCY, CHRONIC: ICD-10-CM

## 2025-06-20 DIAGNOSIS — Z3A.33 33 WEEKS GESTATION OF PREGNANCY: ICD-10-CM

## 2025-06-20 DIAGNOSIS — O09.93 HIGH-RISK PREGNANCY IN THIRD TRIMESTER: ICD-10-CM

## 2025-06-20 LAB
ALBUMIN SERPL-MCNC: 3.6 G/DL (ref 3.5–5.2)
ALBUMIN/GLOB SERPL: 1 {RATIO} (ref 1–2.5)
ALP SERPL-CCNC: 279 U/L (ref 35–104)
ALT SERPL-CCNC: 10 U/L (ref 10–35)
AMOUNT GLUCOSE GIVEN: NORMAL G
ANION GAP SERPL CALCULATED.3IONS-SCNC: 13 MMOL/L (ref 9–16)
AST SERPL-CCNC: 15 U/L (ref 10–35)
BASOPHILS # BLD: <0.03 K/UL (ref 0–0.2)
BASOPHILS NFR BLD: 0 % (ref 0–2)
BILIRUB SERPL-MCNC: <0.2 MG/DL (ref 0–1.2)
BUN SERPL-MCNC: 9 MG/DL (ref 6–20)
CALCIUM SERPL-MCNC: 9.5 MG/DL (ref 8.6–10.4)
CHLORIDE SERPL-SCNC: 106 MMOL/L (ref 98–107)
CO2 SERPL-SCNC: 19 MMOL/L (ref 20–31)
CREAT SERPL-MCNC: 0.5 MG/DL (ref 0.6–0.9)
EOSINOPHIL # BLD: 0.08 K/UL (ref 0–0.44)
EOSINOPHILS RELATIVE PERCENT: 1 % (ref 1–4)
ERYTHROCYTE [DISTWIDTH] IN BLOOD BY AUTOMATED COUNT: 14.5 % (ref 11.8–14.4)
GFR, ESTIMATED: >90 ML/MIN/1.73M2
GLUCOSE 3H P 100 G GLC PO SERPL-MCNC: 140 MG/DL
GLUCOSE SERPL-MCNC: 138 MG/DL (ref 74–99)
HCT VFR BLD AUTO: 32.5 % (ref 36.3–47.1)
HGB BLD-MCNC: 10.4 G/DL (ref 11.9–15.1)
IMM GRANULOCYTES # BLD AUTO: 0.08 K/UL (ref 0–0.3)
IMM GRANULOCYTES NFR BLD: 1 %
LYMPHOCYTES NFR BLD: 2.03 K/UL (ref 1.1–3.7)
LYMPHOCYTES RELATIVE PERCENT: 21 % (ref 24–43)
MCH RBC QN AUTO: 26.1 PG (ref 25.2–33.5)
MCHC RBC AUTO-ENTMCNC: 32 G/DL (ref 28.4–34.8)
MCV RBC AUTO: 81.7 FL (ref 82.6–102.9)
MONOCYTES NFR BLD: 0.5 K/UL (ref 0.1–1.2)
MONOCYTES NFR BLD: 5 % (ref 3–12)
NEUTROPHILS NFR BLD: 72 % (ref 36–65)
NEUTS SEG NFR BLD: 7.04 K/UL (ref 1.5–8.1)
NRBC BLD-RTO: 0 PER 100 WBC
PLATELET # BLD AUTO: 252 K/UL (ref 138–453)
PMV BLD AUTO: 11.1 FL (ref 8.1–13.5)
POTASSIUM SERPL-SCNC: 3.8 MMOL/L (ref 3.7–5.3)
PROT SERPL-MCNC: 7.2 G/DL (ref 6.6–8.7)
RBC # BLD AUTO: 3.98 M/UL (ref 3.95–5.11)
RBC # BLD: ABNORMAL 10*6/UL
SODIUM SERPL-SCNC: 138 MMOL/L (ref 136–145)
T PALLIDUM AB SER QL IA: NONREACTIVE
WBC OTHER # BLD: 9.8 K/UL (ref 3.5–11.3)

## 2025-06-20 NOTE — PROGRESS NOTES
Patient was given Tdap in the Left Deltoid. Per doctor Mcfarland  NDC# 20917-731-59  LOT#   Exp date- 08/26/2027  Patient tolerated well without difficulty       Documentation of Medication Injection Waste per [place] Guidelines  Was there any medication injection waste during this visit?  YES OR NO: No    If Yes:  Medication:   Amount Wasted:   Reason for Waste:    Disposal Method:

## 2025-06-21 LAB
MICROORGANISM SPEC CULT: NORMAL
SERVICE CMNT-IMP: NORMAL
SPECIMEN DESCRIPTION: NORMAL

## 2025-06-24 ENCOUNTER — RESULTS FOLLOW-UP (OUTPATIENT)
Dept: OBGYN | Age: 25
End: 2025-06-24

## 2025-06-24 DIAGNOSIS — O99.810 ABNORMAL GLUCOSE TOLERANCE AFFECTING PREGNANCY, ANTEPARTUM: Primary | ICD-10-CM

## 2025-06-25 NOTE — TELEPHONE ENCOUNTER
SW met with Pt to discuss [employer] and current needs. Pt is 33 weeks at this time. Pt stated she has not been working with [employer] and is still in need of items and programs. Pt stated she is in need of assistance with a car seat and other baby items. SW was able to complete the rest of the assessment without concern. No MH concerns at this time. SW will follow up with [employer]. SW will follow up with Pt as needed and 4-6 weeks postpartum.

## 2025-07-03 ENCOUNTER — ROUTINE PRENATAL (OUTPATIENT)
Age: 25
End: 2025-07-03
Payer: COMMERCIAL

## 2025-07-03 VITALS
HEART RATE: 89 BPM | DIASTOLIC BLOOD PRESSURE: 74 MMHG | WEIGHT: 219 LBS | BODY MASS INDEX: 38.79 KG/M2 | SYSTOLIC BLOOD PRESSURE: 124 MMHG

## 2025-07-03 DIAGNOSIS — O09.93 HIGH-RISK PREGNANCY IN THIRD TRIMESTER: Primary | ICD-10-CM

## 2025-07-03 DIAGNOSIS — F12.91 HISTORY OF MARIJUANA USE: ICD-10-CM

## 2025-07-03 DIAGNOSIS — Z3A.35 35 WEEKS GESTATION OF PREGNANCY: ICD-10-CM

## 2025-07-03 DIAGNOSIS — O10.919 HTN IN PREGNANCY, CHRONIC: ICD-10-CM

## 2025-07-03 DIAGNOSIS — F14.90 COCAINE USE: ICD-10-CM

## 2025-07-03 DIAGNOSIS — B95.1 GROUP B STREPTOCOCCUS URINARY TRACT INFECTION AFFECTING PREGNANCY IN SECOND TRIMESTER: ICD-10-CM

## 2025-07-03 DIAGNOSIS — O23.42 GROUP B STREPTOCOCCUS URINARY TRACT INFECTION AFFECTING PREGNANCY IN SECOND TRIMESTER: ICD-10-CM

## 2025-07-03 PROCEDURE — 59025 FETAL NON-STRESS TEST: CPT | Performed by: OBSTETRICS & GYNECOLOGY

## 2025-07-03 PROCEDURE — 99214 OFFICE O/P EST MOD 30 MIN: CPT | Performed by: OBSTETRICS & GYNECOLOGY

## 2025-07-03 RX ORDER — ONDANSETRON 4 MG/1
4 TABLET, ORALLY DISINTEGRATING ORAL 3 TIMES DAILY PRN
Qty: 21 TABLET | Refills: 0 | Status: SHIPPED | OUTPATIENT
Start: 2025-07-03

## 2025-07-03 RX ORDER — NIFEDIPINE 30 MG/1
30 TABLET, EXTENDED RELEASE ORAL DAILY
Qty: 90 TABLET | Refills: 1 | Status: SHIPPED | OUTPATIENT
Start: 2025-07-03

## 2025-07-03 RX ORDER — VALACYCLOVIR HYDROCHLORIDE 500 MG/1
500 TABLET, FILM COATED ORAL 2 TIMES DAILY
Qty: 60 TABLET | Refills: 1 | Status: SHIPPED | OUTPATIENT
Start: 2025-07-03

## 2025-07-03 NOTE — PROGRESS NOTES
Prenatal Visit    Hodan Renteria is a 24 y.o. female  at 35w1d    The patient was seen and evaluated. She is complaining of continued nausea/vomiting. She was previously prescribed antiemetics that she has not picked up from the pharmacy but feels well now. She reports positive fetal movements. She denies contractions, vaginal bleeding and leakage of fluid. Signs and symptoms of labor and pre-eclampsia were reviewed with the patient in detail. She is to report any of these if they occur. She currently denies any signs or symptoms of pre-clampsia including headache, vision changes, RUQ pain.    The patient is Rh pos and Rhogam is not indicated in this pregnancy.  The patient already received  the T-Dap Vaccine (27-36 weeks) this pregnancy.   The patient declined the influenza vaccine this year.   The patient declined the COVID-19 vaccine this year.   The patient was counseled on benefits of receiving RSV vaccination between 32-36 weeks.    The problem list reflects the active issues addressed during today's visit.    Allergies:  No Known Allergies    Vitals:  BP: 124/74  Weight - Scale: 99.3 kg (219 lb)  Pulse: 89  Albumin: Negative  Glucose: Negative   Fundal Height: 35cm      NST:   Fetal heart rate baseline: 130, moderate variability, accelerations present, decelerations absent    The tracing has been reviewed and is considered reactive.        Assessment & Plan:  Hodan Renteria is a 24 y.o. female  at 35w1d   - GBS testing not done as patient has prior history of GBS bacteruria   - Tdap vaccination: discussed recommendations for TDAP immunization, patient already received.   - Rhogam: NI   - Influenza vaccination: Declined   - COVID-19 vaccination: R/B/A discussed with increased risk of both maternal and fetal morbidity and mortality in unvaccinated pregnant patients who contract COVID-19- patient declined today   - RSV vaccination (32-36 weeks): The patient was counseled on benefits to her

## 2025-07-07 NOTE — PROGRESS NOTES
Attending Physician Statement  I have discussed the care of Hodan Renteria, including pertinent history and exam findings,  with the resident. I have reviewed the key elements of all parts of the encounter with the resident.  I agree with the assessment, plan and orders as documented by the resident.  (GE Modifier)    Karla Perkins,

## 2025-07-15 ENCOUNTER — ROUTINE PRENATAL (OUTPATIENT)
Age: 25
End: 2025-07-15

## 2025-07-15 VITALS
HEIGHT: 63 IN | RESPIRATION RATE: 16 BRPM | HEART RATE: 74 BPM | SYSTOLIC BLOOD PRESSURE: 132 MMHG | BODY MASS INDEX: 39.06 KG/M2 | DIASTOLIC BLOOD PRESSURE: 84 MMHG | TEMPERATURE: 98.2 F | WEIGHT: 220.46 LBS

## 2025-07-15 DIAGNOSIS — O99.810 ABNORMAL GLUCOSE TOLERANCE AFFECTING PREGNANCY, ANTEPARTUM: ICD-10-CM

## 2025-07-15 DIAGNOSIS — Z87.59 PREVIOUS BABY WITH FETAL GROWTH RESTRICTION: ICD-10-CM

## 2025-07-15 DIAGNOSIS — O10.919 HTN IN PREGNANCY, CHRONIC: ICD-10-CM

## 2025-07-15 DIAGNOSIS — O09.93 HIGH-RISK PREGNANCY IN THIRD TRIMESTER: ICD-10-CM

## 2025-07-15 DIAGNOSIS — O36.5930 POOR FETAL GROWTH AFFECTING MANAGEMENT OF MOTHER IN THIRD TRIMESTER, SINGLE OR UNSPECIFIED FETUS: Primary | ICD-10-CM

## 2025-07-15 DIAGNOSIS — Z34.90 SIXTH PREGNANCY: ICD-10-CM

## 2025-07-15 DIAGNOSIS — Z3A.36 36 WEEKS GESTATION OF PREGNANCY: ICD-10-CM

## 2025-07-15 NOTE — PROGRESS NOTES
[facility], [facility] MATERNAL FETAL MED  [address]  [place] [address]  Dept: [phone]     7/15/2025    RE:  Hodan Renteria     : 2000   AGE: 24 y.o.     Dear Dr. Navas,    Thank you for allowing me to see Hodan Renteria.  As I'm sure you will recall, Hodan Renteria is a 24 y.o. [id number] LMP recorded (lmp unknown). Patient is pregnant. Estimated Date of Delivery: 25 at 36w6d seen in our office today for the following:    REASON FOR VISIT: Growth, BPP, umbilical Dopplers    Patient Active Problem List    Diagnosis Date Noted    Hx recurrent SAB 2022    Exposure to genital herpes (current partner) 2022    Poor fetal growth affecting management of mother in third trimester 07/15/2025    Elv 1 hr GTT, 3 hr ordered 2025    36 weeks gestation of pregnancy 2025    High-risk pregnancy in third trimester 2025    Group B Streptococcus urinary tract infection affecting pregnancy in second trimester 2025    Cocaine use 2025    Right ovarian simple cyst 2025    Sixth pregnancy 2025    THC Use 2025    Pregravid BMI 30.12 2025    Patient declines all vaccines in pregnancy 2025    TAB x1 10/14/2023    Dysmenorrhea 10/14/2023    Hx FGR (G4) 2022    cHTN (meds) 2022        PAST HISTORY:  OB History    Para Term  AB Living   6 1 1 0 4 1   SAB IAB Ectopic Molar Multiple Live Births   3 1 0 0 0 1      # Outcome Date GA Lbr Moiz/2nd Weight Sex Type Anes PTL Lv   6 Current            5 IAB 23 10w0d    TAB      4 Term 22 37w6d 03:46 / 00:44 2.565 kg (5 lb 10.5 oz) M Vag-Spont EPI N ELVIA   3 2021     SAB      2 SAB      SAB      1 SAB      SAB         Obstetric Comments   G1-G6: FOB#1      G1, G2, G3: No intervention   G4:    G5: TAB, medication   G6:          MEDICAL:  Past Medical History:   Diagnosis Date    Chlamydia

## 2025-07-16 ENCOUNTER — APPOINTMENT (OUTPATIENT)
Dept: LABOR AND DELIVERY | Age: 25
End: 2025-07-16
Payer: COMMERCIAL

## 2025-07-16 ENCOUNTER — HOSPITAL ENCOUNTER (INPATIENT)
Age: 25
LOS: 2 days | Discharge: HOME | End: 2025-07-18
Attending: STUDENT IN AN ORGANIZED HEALTH CARE EDUCATION/TRAINING PROGRAM | Admitting: STUDENT IN AN ORGANIZED HEALTH CARE EDUCATION/TRAINING PROGRAM
Payer: COMMERCIAL

## 2025-07-16 PROBLEM — Z3A.37 37 WEEKS GESTATION OF PREGNANCY: Status: ACTIVE | Noted: 2025-07-16

## 2025-07-16 LAB
ABO + RH BLD: NORMAL
ALBUMIN SERPL-MCNC: 3 G/DL (ref 3.5–5.2)
ALBUMIN/GLOB SERPL: 0.9 {RATIO} (ref 1–2.5)
ALP SERPL-CCNC: 346 U/L (ref 35–104)
ALT SERPL-CCNC: 9 U/L (ref 10–35)
AMPHET UR QL SCN: NEGATIVE
ANION GAP SERPL CALCULATED.3IONS-SCNC: 13 MMOL/L (ref 9–16)
ARM BAND NUMBER: NORMAL
AST SERPL-CCNC: 19 U/L (ref 10–35)
BARBITURATES UR QL SCN: NEGATIVE
BASOPHILS # BLD: 0.03 K/UL (ref 0–0.2)
BASOPHILS NFR BLD: 0 % (ref 0–2)
BENZODIAZ UR QL: NEGATIVE
BILIRUB SERPL-MCNC: <0.2 MG/DL (ref 0–1.2)
BLOOD BANK SAMPLE EXPIRATION: NORMAL
BLOOD GROUP ANTIBODIES SERPL: NEGATIVE
BUN SERPL-MCNC: 9 MG/DL (ref 6–20)
CALCIUM SERPL-MCNC: 9.1 MG/DL (ref 8.6–10.4)
CANNABINOIDS UR QL SCN: POSITIVE
CHLORIDE SERPL-SCNC: 104 MMOL/L (ref 98–107)
CO2 SERPL-SCNC: 18 MMOL/L (ref 20–31)
COCAINE UR QL SCN: NEGATIVE
CREAT SERPL-MCNC: 0.6 MG/DL (ref 0.6–0.9)
CREAT UR-MCNC: 274 MG/DL (ref 28–217)
EOSINOPHIL # BLD: 0.09 K/UL (ref 0–0.44)
EOSINOPHILS RELATIVE PERCENT: 1 % (ref 1–4)
ERYTHROCYTE [DISTWIDTH] IN BLOOD BY AUTOMATED COUNT: 14.5 % (ref 11.8–14.4)
FENTANYL UR QL: NEGATIVE
GFR, ESTIMATED: >90 ML/MIN/1.73M2
GLUCOSE SERPL-MCNC: 137 MG/DL (ref 74–99)
HCT VFR BLD AUTO: 30.9 % (ref 36.3–47.1)
HGB BLD-MCNC: 9.8 G/DL (ref 11.9–15.1)
IMM GRANULOCYTES # BLD AUTO: 0.07 K/UL (ref 0–0.3)
IMM GRANULOCYTES NFR BLD: 1 %
LYMPHOCYTES NFR BLD: 1.89 K/UL (ref 1.1–3.7)
LYMPHOCYTES RELATIVE PERCENT: 23 % (ref 24–43)
MCH RBC QN AUTO: 25 PG (ref 25.2–33.5)
MCHC RBC AUTO-ENTMCNC: 31.7 G/DL (ref 28.4–34.8)
MCV RBC AUTO: 78.8 FL (ref 82.6–102.9)
METHADONE UR QL: NEGATIVE
MONOCYTES NFR BLD: 0.45 K/UL (ref 0.1–1.2)
MONOCYTES NFR BLD: 5 % (ref 3–12)
NEUTROPHILS NFR BLD: 70 % (ref 36–65)
NEUTS SEG NFR BLD: 5.88 K/UL (ref 1.5–8.1)
NRBC BLD-RTO: 0 PER 100 WBC
OPIATES UR QL SCN: NEGATIVE
OXYCODONE UR QL SCN: NEGATIVE
PCP UR QL SCN: NEGATIVE
PLATELET # BLD AUTO: 219 K/UL (ref 138–453)
PMV BLD AUTO: 10.9 FL (ref 8.1–13.5)
POTASSIUM SERPL-SCNC: 3.6 MMOL/L (ref 3.7–5.3)
PROT SERPL-MCNC: 6.5 G/DL (ref 6.6–8.7)
RBC # BLD AUTO: 3.92 M/UL (ref 3.95–5.11)
RBC # BLD: ABNORMAL 10*6/UL
SODIUM SERPL-SCNC: 135 MMOL/L (ref 136–145)
T PALLIDUM AB SER QL IA: NONREACTIVE
TEST INFORMATION: ABNORMAL
TOTAL PROTEIN, URINE: 29 MG/DL
URINE TOTAL PROTEIN CREATININE RATIO: 0.11 (ref 0–0.2)
WBC OTHER # BLD: 8.4 K/UL (ref 3.5–11.3)

## 2025-07-16 PROCEDURE — 86780 TREPONEMA PALLIDUM: CPT

## 2025-07-16 PROCEDURE — 82570 ASSAY OF URINE CREATININE: CPT

## 2025-07-16 PROCEDURE — 6360000002 HC RX W HCPCS

## 2025-07-16 PROCEDURE — 84156 ASSAY OF PROTEIN URINE: CPT

## 2025-07-16 PROCEDURE — 86850 RBC ANTIBODY SCREEN: CPT

## 2025-07-16 PROCEDURE — 86900 BLOOD TYPING SEROLOGIC ABO: CPT

## 2025-07-16 PROCEDURE — 2580000003 HC RX 258

## 2025-07-16 PROCEDURE — 6370000000 HC RX 637 (ALT 250 FOR IP)

## 2025-07-16 PROCEDURE — 85025 COMPLETE CBC W/AUTO DIFF WBC: CPT

## 2025-07-16 PROCEDURE — 80053 COMPREHEN METABOLIC PANEL: CPT

## 2025-07-16 PROCEDURE — 86901 BLOOD TYPING SEROLOGIC RH(D): CPT

## 2025-07-16 PROCEDURE — 80307 DRUG TEST PRSMV CHEM ANLYZR: CPT

## 2025-07-16 PROCEDURE — 1220000000 HC SEMI PRIVATE OB R&B

## 2025-07-16 RX ORDER — SODIUM CHLORIDE 9 MG/ML
INJECTION, SOLUTION INTRAVENOUS PRN
Status: DISCONTINUED | OUTPATIENT
Start: 2025-07-16 | End: 2025-07-17

## 2025-07-16 RX ORDER — LOPERAMIDE HYDROCHLORIDE 2 MG/1
2 CAPSULE ORAL PRN
Status: DISCONTINUED | OUTPATIENT
Start: 2025-07-16 | End: 2025-07-17

## 2025-07-16 RX ORDER — VALACYCLOVIR HYDROCHLORIDE 500 MG/1
500 TABLET, FILM COATED ORAL 2 TIMES DAILY
Status: DISCONTINUED | OUTPATIENT
Start: 2025-07-16 | End: 2025-07-17

## 2025-07-16 RX ORDER — SODIUM CHLORIDE, SODIUM LACTATE, POTASSIUM CHLORIDE, AND CALCIUM CHLORIDE .6; .31; .03; .02 G/100ML; G/100ML; G/100ML; G/100ML
1000 INJECTION, SOLUTION INTRAVENOUS PRN
Status: DISCONTINUED | OUTPATIENT
Start: 2025-07-16 | End: 2025-07-17

## 2025-07-16 RX ORDER — NIFEDIPINE 30 MG/1
30 TABLET, EXTENDED RELEASE ORAL ONCE
Status: COMPLETED | OUTPATIENT
Start: 2025-07-16 | End: 2025-07-16

## 2025-07-16 RX ORDER — SODIUM CHLORIDE 0.9 % (FLUSH) 0.9 %
5-40 SYRINGE (ML) INJECTION EVERY 12 HOURS SCHEDULED
Status: DISCONTINUED | OUTPATIENT
Start: 2025-07-16 | End: 2025-07-17

## 2025-07-16 RX ORDER — DOCUSATE SODIUM 100 MG/1
100 CAPSULE, LIQUID FILLED ORAL 2 TIMES DAILY
Status: DISCONTINUED | OUTPATIENT
Start: 2025-07-16 | End: 2025-07-16

## 2025-07-16 RX ORDER — NIFEDIPINE 30 MG/1
30 TABLET, EXTENDED RELEASE ORAL DAILY
Status: DISCONTINUED | OUTPATIENT
Start: 2025-07-16 | End: 2025-07-16 | Stop reason: SDUPTHER

## 2025-07-16 RX ORDER — SODIUM CHLORIDE, SODIUM LACTATE, POTASSIUM CHLORIDE, CALCIUM CHLORIDE 600; 310; 30; 20 MG/100ML; MG/100ML; MG/100ML; MG/100ML
INJECTION, SOLUTION INTRAVENOUS CONTINUOUS
Status: DISCONTINUED | OUTPATIENT
Start: 2025-07-16 | End: 2025-07-17

## 2025-07-16 RX ORDER — NIFEDIPINE 30 MG/1
60 TABLET, EXTENDED RELEASE ORAL DAILY
Status: DISCONTINUED | OUTPATIENT
Start: 2025-07-17 | End: 2025-07-17

## 2025-07-16 RX ORDER — DOCUSATE SODIUM 100 MG/1
100 CAPSULE, LIQUID FILLED ORAL 2 TIMES DAILY PRN
Status: DISCONTINUED | OUTPATIENT
Start: 2025-07-16 | End: 2025-07-17

## 2025-07-16 RX ORDER — SODIUM CHLORIDE, SODIUM LACTATE, POTASSIUM CHLORIDE, AND CALCIUM CHLORIDE .6; .31; .03; .02 G/100ML; G/100ML; G/100ML; G/100ML
500 INJECTION, SOLUTION INTRAVENOUS PRN
Status: DISCONTINUED | OUTPATIENT
Start: 2025-07-16 | End: 2025-07-17

## 2025-07-16 RX ORDER — SODIUM CHLORIDE 0.9 % (FLUSH) 0.9 %
5-40 SYRINGE (ML) INJECTION PRN
Status: DISCONTINUED | OUTPATIENT
Start: 2025-07-16 | End: 2025-07-17

## 2025-07-16 RX ORDER — ACETAMINOPHEN 500 MG
1000 TABLET ORAL EVERY 6 HOURS PRN
Status: DISCONTINUED | OUTPATIENT
Start: 2025-07-16 | End: 2025-07-17

## 2025-07-16 RX ORDER — NALBUPHINE HYDROCHLORIDE 10 MG/ML
10 INJECTION INTRAMUSCULAR; INTRAVENOUS; SUBCUTANEOUS ONCE
Status: COMPLETED | OUTPATIENT
Start: 2025-07-17 | End: 2025-07-17

## 2025-07-16 RX ADMIN — VALACYCLOVIR HYDROCHLORIDE 500 MG: 500 TABLET, FILM COATED ORAL at 21:05

## 2025-07-16 RX ADMIN — PENICILLIN G POTASSIUM 5 MILLION UNITS: 5000000 INJECTION, POWDER, FOR SOLUTION INTRAMUSCULAR; INTRAVENOUS at 10:25

## 2025-07-16 RX ADMIN — NIFEDIPINE 30 MG: 30 TABLET, FILM COATED, EXTENDED RELEASE ORAL at 08:47

## 2025-07-16 RX ADMIN — Medication 1 MILLI-UNITS/MIN: at 18:38

## 2025-07-16 RX ADMIN — Medication 2.5 MILLION UNITS: at 14:15

## 2025-07-16 RX ADMIN — Medication 25 MCG: at 13:11

## 2025-07-16 RX ADMIN — Medication 2.5 MILLION UNITS: at 22:38

## 2025-07-16 RX ADMIN — Medication 25 MCG: at 08:47

## 2025-07-16 RX ADMIN — ACETAMINOPHEN 1000 MG: 500 TABLET ORAL at 19:21

## 2025-07-16 RX ADMIN — SODIUM CHLORIDE, SODIUM LACTATE, POTASSIUM CHLORIDE, AND CALCIUM CHLORIDE: .6; .31; .03; .02 INJECTION, SOLUTION INTRAVENOUS at 18:36

## 2025-07-16 RX ADMIN — Medication 2.5 MILLION UNITS: at 18:38

## 2025-07-16 RX ADMIN — NIFEDIPINE 30 MG: 30 TABLET, FILM COATED, EXTENDED RELEASE ORAL at 17:19

## 2025-07-16 RX ADMIN — VALACYCLOVIR HYDROCHLORIDE 500 MG: 500 TABLET, FILM COATED ORAL at 14:15

## 2025-07-16 NOTE — CARE COORDINATION
ANTEPARTUM NOTE    37 weeks gestation of pregnancy [Z3A.37]    Hodan was admitted to L&D on 7/16/25 for IOL @ 37w0d 2/2 FGR, cHTN & elevated UADs.     OB GYN Provider: [facility]    Will meet with patient after delivery to verify name/address/phone/insurance and discuss discharge planning.     Anticipate DC home 2 nights after vaginal delivery or 4 nights after C/S delivery as long as hemodynamically stable.

## 2025-07-16 NOTE — PROGRESS NOTES
Labor Progress Note    Hodan Renteria is a 24 y.o. female  at 37w0d  The patient was seen and examined. Her pain is well controlled. She reports fetal movement is present, complains of irregular contractions, denies loss of fluid, denies vaginal bleeding.       Vital Signs:  Vitals:    25 0754 25 1312 25 1622   BP: (!) 145/83 (!) 140/83 (!) 141/68   Pulse: 99 83 78   Resp: 18     Temp: 98.5 °F (36.9 °C)     TempSrc: Oral         FHT: 130, moderate variability, accelerations present, decelerations absent  Contractions: uterine irritability    Chaperone for Intimate Exam: Chaperone was present for entire exam, Chaperone Name: SAI Morris   Cervical Exam: 1 cm dilated, 30% effaced, -2 station  Pitocin: none    Membranes: Intact  Scalp Electrode in place: absent  Intrauterine Pressure Catheter in Place: absent    Interventions: SVE, Conde balloon inserted    Assessment/Plan:  Hodan Renteria is a 24 y.o. female  at 37w0d admitted for IOL 2/2 FGR (EFW<10%tile), cHTN (meds)   - GBS bacteruria, Pen G for GBS prophylaxis  - VSS, afebrile    - cEFM and TOCO: cat 1 w/ uterine irritability   - Membranes intact   - SVE 1/30%/-2   - s/p Cytotec 25 mcg PO x2   - Conde balloon inserted and filled with 60 mL NS without difficulty.    - Low dose pitocin ordered to start 4 hours after last dose of cytotec. Initiate per protocol.    - Continue to monitor     cHTN (meds)   - Denies s/sx PreE   - BP consistently elevated non-severe   - Currently on Procardia XL 30 mg qD. Will add another 30 mg to increase to Procardia XL 60 mg qD   - PreE labs wnl, P/C 0.11   - Continue to monitor     Attending updated and in agreement with plan    Kaykay Rajan MD  Ob/Gyn Resident  2025, 4:44 PM

## 2025-07-16 NOTE — H&P
allergies.    MEDICATIONS:  Prior to Admission medications    Medication Sig Start Date End Date Taking? Authorizing Provider   NIFEdipine (PROCARDIA XL) 30 MG extended release tablet Take 1 tablet by mouth daily 7/3/25   Abe Hua MD   ondansetron (ZOFRAN-ODT) 4 MG disintegrating tablet Take 1 tablet by mouth 3 times daily as needed for Nausea or Vomiting 7/3/25   Abe Hua MD   valACYclovir (VALTREX) 500 MG tablet Take 1 tablet by mouth 2 times daily 7/3/25   Abe Hua MD   NIFEdipine (PROCARDIA XL) 30 MG extended release tablet Take 1 tablet by mouth daily  Patient not taking: Reported on 7/15/2025 6/19/25   Aiden Mcfarland MD   doxyLAMINE succinate (GNP SLEEP AID) 25 MG tablet Take 1 tablet by mouth nightly  Patient not taking: Reported on 7/15/2025 2/24/25   Elida Alcantara DO   aspirin (ASPIRIN CHILDRENS) 81 MG chewable tablet Take 1 tablet by mouth daily  Patient not taking: Reported on 7/15/2025 2/3/25   Natividad Navas DO   Prenatal Vit-Fe Fumarate-FA (PRENATAL VITAMINS) 28-0.8 MG TABS Take 1 tablet by mouth daily  Patient not taking: Reported on 7/15/2025 1/17/25   Natividad Navas DO   vitamin B-6 (PYRIDOXINE) 50 MG tablet Take 1 tablet by mouth daily  Patient not taking: Reported on 7/3/2025 1/17/25   Natividad Navas DO       FAMILY HISTORY:  family history includes Arthritis in her maternal grandmother; Cerebral Aneurysm in her maternal grandmother; Colon Cancer in her father; Diabetes in her maternal grandfather; Heart Attack in her maternal grandfather; Heart Disease in her half-brother and maternal grandmother; No Known Problems in her brother, mother, paternal grandfather, paternal grandmother, and sister; Pancreatic Cancer in her father; Stroke in her maternal grandfather.    SOCIAL HISTORY:   reports that she has never smoked. She has been exposed to tobacco smoke. She has never used smokeless tobacco. She reports that she does not currently use alcohol. She reports  patient. Admission, and post admission procedures and expectations were discussed in detail. All questions were answered.    Attending's Name: Dr. Apolinar Arias DO  Ob/Gyn Resident  7/16/2025, 8:00 AM

## 2025-07-16 NOTE — DISCHARGE SUMMARY
Obstetric Discharge Summary  [facility]    Patient Name: Hodan Renteria  Patient : 2000  Primary Care Physician: No primary care provider on file.  Admit Date: 2025    Principal Diagnosis: IUP at 37w0d, admitted for IOL 2/2 FGR (EFW<10 th percentile) w/ cHTN (meds)     Her pregnancy has been complicated by:   Patient Active Problem List   Diagnosis    Hx recurrent SAB    Exposure to genital herpes (current partner)    cHTN (meds)    Hx FGR (G4)    TAB x1    Dysmenorrhea    THC Use    Pregravid BMI 30.12    Patient declines all vaccines in pregnancy    Sixth pregnancy    Cocaine use    Right ovarian simple cyst    Group B Streptococcus urinary tract infection affecting pregnancy in second trimester    36 weeks gestation of pregnancy    High-risk pregnancy in third trimester    Elv 1 hr GTT, 3 hr ordered    Poor fetal growth affecting management of mother in third trimester    37 weeks gestation of pregnancy       Surgical Operations & Procedures:  Analgesia: epidural  Delivery Type: Spontaneous Vaginal Delivery: See Labor and Delivery Summary   Laceration(s): Absent    Consultations: Anesthesia    Pertinent Findings & Procedures:   Hodan Renteria is a 24 y.o. female  at 37w0d admitted for IOL 2/2 FGR (EFW<10 th percentile) w/ cHTN (meds) ; received PO Cytotec 25mcg x2, lehman balloon, Pitocin, AROM (clear) @1128, nubain x3, epidural.    Patient's BP persistently elevated, Procardia increased to 60 mg XL> 90XL. PreE labs wnl, P/C 0.11 on admission. On admission, Hgb 9.8 and PO iron sent for discharge.     She delivered by spontaneous vaginal a Live Born infant on 25.       Information for the patient's :  Dexter, Girl Hodan [[id number]]   female   Birth Weight: 2.205 kg (4 lb 13.8 oz)    Apgars: 8 at 1 minute and 9 at 5 minutes.     Postpartum course: normal.   PPD#1: Procardia increased to 120 XL qD. Patient stable for DC    Course of patient:  uncomplicated    Discharge to: Home    Readmission planned: no     Indication for 6 week PP 2 hour GTT?: no     Eligible for 2 week PP virtual visit? yes    Contraception: no method    Recommendations on Discharge:     Medications:      Medication List        START taking these medications      acetaminophen 500 MG tablet  Commonly known as: TYLENOL  Take 2 tablets by mouth every 6 hours as needed for Pain     ferrous sulfate 325 (65 Fe) MG tablet  Commonly known as: IRON 325  Take 1 tablet by mouth 2 times daily     ibuprofen 600 MG tablet  Commonly known as: ADVIL;MOTRIN  Take 1 tablet by mouth every 6 hours as needed for Pain     senna 8.6 MG Tabs tablet  Commonly known as: SENOKOT  Take 2 tablets by mouth 2 times daily            CHANGE how you take these medications      * NIFEdipine 30 MG extended release tablet  Commonly known as: PROCARDIA XL  Take 1 tablet by mouth daily  What changed: Another medication with the same name was added. Make sure you understand how and when to take each.     * NIFEdipine 60 MG extended release tablet  Commonly known as: PROCARDIA XL  Take 2 tablets by mouth daily  What changed: You were already taking a medication with the same name, and this prescription was added. Make sure you understand how and when to take each.           * This list has 2 medication(s) that are the same as other medications prescribed for you. Read the directions carefully, and ask your doctor or other care provider to review them with you.                CONTINUE taking these medications      ondansetron 4 MG disintegrating tablet  Commonly known as: ZOFRAN-ODT  Take 1 tablet by mouth 3 times daily as needed for Nausea or Vomiting     valACYclovir 500 MG tablet  Commonly known as: Valtrex  Take 1 tablet by mouth 2 times daily            ASK your doctor about these medications      aspirin 81 MG chewable tablet  Commonly known as: Aspirin Childrens  Take 1 tablet by mouth daily     doxyLAMINE succinate

## 2025-07-16 NOTE — PROGRESS NOTES
Ob/Gyn Resident Tracing Note:     Tracing reviewed from [id number]    Baseline: 125 bpm  Variability: moderate  Accelerations: present  Decelerations: absent  TOCO: some uterine irritability    Periods of broken up tracing however reassuring. Category 1. Continue to monitor.       Elida Alcantara DO  OB/GYN Resident, PGY3  [facility]  7/16/2025 4:14 PM

## 2025-07-16 NOTE — FLOWSHEET NOTE
Pt admitted to room 705 from home via ambulatory for scheduled 0800 IOL for FGR, cHTN, and elevated dopplers. Pt assisted into bed. Oriented to room and surroundings, updated on POC.  Pt verbalizes understanding.  Call light in reach and side rails up x2.  Residents notified of arrival.

## 2025-07-17 ENCOUNTER — ANESTHESIA (OUTPATIENT)
Dept: LABOR AND DELIVERY | Age: 25
End: 2025-07-17
Payer: COMMERCIAL

## 2025-07-17 ENCOUNTER — ANESTHESIA EVENT (OUTPATIENT)
Dept: LABOR AND DELIVERY | Age: 25
End: 2025-07-17
Payer: COMMERCIAL

## 2025-07-17 PROCEDURE — 2580000003 HC RX 258

## 2025-07-17 PROCEDURE — 6360000002 HC RX W HCPCS

## 2025-07-17 PROCEDURE — 6360000002 HC RX W HCPCS: Performed by: NURSE ANESTHETIST, CERTIFIED REGISTERED

## 2025-07-17 PROCEDURE — 88307 TISSUE EXAM BY PATHOLOGIST: CPT

## 2025-07-17 PROCEDURE — 7200000001 HC VAGINAL DELIVERY

## 2025-07-17 PROCEDURE — 6370000000 HC RX 637 (ALT 250 FOR IP)

## 2025-07-17 PROCEDURE — 3E0DXGC INTRODUCTION OF OTHER THERAPEUTIC SUBSTANCE INTO MOUTH AND PHARYNX, EXTERNAL APPROACH: ICD-10-PCS

## 2025-07-17 PROCEDURE — 6360000002 HC RX W HCPCS: Performed by: ANESTHESIOLOGY

## 2025-07-17 PROCEDURE — 51701 INSERT BLADDER CATHETER: CPT

## 2025-07-17 PROCEDURE — 0U7C7DJ DILATION OF CERVIX WITH INTRALUM DEV, TEMP, VIA OPENING: ICD-10-PCS

## 2025-07-17 PROCEDURE — 10907ZC DRAINAGE OF AMNIOTIC FLUID, THERAPEUTIC FROM PRODUCTS OF CONCEPTION, VIA NATURAL OR ARTIFICIAL OPENING: ICD-10-PCS

## 2025-07-17 PROCEDURE — 1220000000 HC SEMI PRIVATE OB R&B

## 2025-07-17 PROCEDURE — 3E033VJ INTRODUCTION OF OTHER HORMONE INTO PERIPHERAL VEIN, PERCUTANEOUS APPROACH: ICD-10-PCS

## 2025-07-17 PROCEDURE — 3700000025 EPIDURAL BLOCK: Performed by: ANESTHESIOLOGY

## 2025-07-17 RX ORDER — ONDANSETRON 4 MG/1
4 TABLET, ORALLY DISINTEGRATING ORAL EVERY 6 HOURS PRN
Status: DISCONTINUED | OUTPATIENT
Start: 2025-07-17 | End: 2025-07-17

## 2025-07-17 RX ORDER — NALBUPHINE HYDROCHLORIDE 10 MG/ML
10 INJECTION INTRAMUSCULAR; INTRAVENOUS; SUBCUTANEOUS ONCE
Status: COMPLETED | OUTPATIENT
Start: 2025-07-17 | End: 2025-07-17

## 2025-07-17 RX ORDER — ACETAMINOPHEN 500 MG
1000 TABLET ORAL EVERY 6 HOURS PRN
Qty: 30 TABLET | Refills: 0 | Status: SHIPPED | OUTPATIENT
Start: 2025-07-17

## 2025-07-17 RX ORDER — ONDANSETRON 4 MG/1
4 TABLET, ORALLY DISINTEGRATING ORAL EVERY 6 HOURS PRN
Status: DISCONTINUED | OUTPATIENT
Start: 2025-07-17 | End: 2025-07-19 | Stop reason: HOSPADM

## 2025-07-17 RX ORDER — ONDANSETRON 2 MG/ML
INJECTION INTRAMUSCULAR; INTRAVENOUS
Status: COMPLETED
Start: 2025-07-17 | End: 2025-07-17

## 2025-07-17 RX ORDER — LIDOCAINE HYDROCHLORIDE AND EPINEPHRINE 15; 5 MG/ML; UG/ML
INJECTION, SOLUTION EPIDURAL
Status: DISCONTINUED | OUTPATIENT
Start: 2025-07-17 | End: 2025-07-17 | Stop reason: SDUPTHER

## 2025-07-17 RX ORDER — IBUPROFEN 600 MG/1
600 TABLET, FILM COATED ORAL EVERY 6 HOURS PRN
Qty: 30 TABLET | Refills: 0 | Status: SHIPPED | OUTPATIENT
Start: 2025-07-17

## 2025-07-17 RX ORDER — ONDANSETRON 2 MG/ML
4 INJECTION INTRAMUSCULAR; INTRAVENOUS EVERY 6 HOURS PRN
Status: DISCONTINUED | OUTPATIENT
Start: 2025-07-17 | End: 2025-07-17

## 2025-07-17 RX ORDER — LIDOCAINE HYDROCHLORIDE 10 MG/ML
INJECTION, SOLUTION EPIDURAL; INFILTRATION; INTRACAUDAL; PERINEURAL
Status: DISCONTINUED | OUTPATIENT
Start: 2025-07-17 | End: 2025-07-17 | Stop reason: SDUPTHER

## 2025-07-17 RX ORDER — NIFEDIPINE 30 MG/1
30 TABLET, EXTENDED RELEASE ORAL ONCE
Status: COMPLETED | OUTPATIENT
Start: 2025-07-17 | End: 2025-07-17

## 2025-07-17 RX ORDER — ROPIVACAINE HYDROCHLORIDE 2 MG/ML
INJECTION, SOLUTION EPIDURAL; INFILTRATION; PERINEURAL
Status: COMPLETED
Start: 2025-07-17 | End: 2025-07-17

## 2025-07-17 RX ORDER — ONDANSETRON 2 MG/ML
4 INJECTION INTRAMUSCULAR; INTRAVENOUS EVERY 6 HOURS PRN
Status: DISCONTINUED | OUTPATIENT
Start: 2025-07-17 | End: 2025-07-19 | Stop reason: HOSPADM

## 2025-07-17 RX ORDER — NIFEDIPINE 30 MG/1
90 TABLET, EXTENDED RELEASE ORAL DAILY
Status: DISCONTINUED | OUTPATIENT
Start: 2025-07-18 | End: 2025-07-18

## 2025-07-17 RX ORDER — SENNOSIDES 8.6 MG
2 TABLET ORAL 2 TIMES DAILY
Qty: 120 TABLET | Refills: 0 | Status: SHIPPED | OUTPATIENT
Start: 2025-07-17

## 2025-07-17 RX ORDER — EPHEDRINE SULFATE/0.9% NACL/PF 25 MG/5 ML
10 SYRINGE (ML) INTRAVENOUS
Status: DISCONTINUED | OUTPATIENT
Start: 2025-07-17 | End: 2025-07-17

## 2025-07-17 RX ADMIN — ONDANSETRON 4 MG: 2 INJECTION, SOLUTION INTRAMUSCULAR; INTRAVENOUS at 23:41

## 2025-07-17 RX ADMIN — NIFEDIPINE 30 MG: 30 TABLET, FILM COATED, EXTENDED RELEASE ORAL at 17:37

## 2025-07-17 RX ADMIN — OXYTOCIN 166.7 ML: 10 INJECTION, SOLUTION INTRAMUSCULAR; INTRAVENOUS at 21:26

## 2025-07-17 RX ADMIN — LIDOCAINE HYDROCHLORIDE,EPINEPHRINE BITARTRATE 5 ML: 15; .005 INJECTION, SOLUTION EPIDURAL; INFILTRATION; INTRACAUDAL; PERINEURAL at 14:40

## 2025-07-17 RX ADMIN — SODIUM CHLORIDE, SODIUM LACTATE, POTASSIUM CHLORIDE, AND CALCIUM CHLORIDE: .6; .31; .03; .02 INJECTION, SOLUTION INTRAVENOUS at 12:55

## 2025-07-17 RX ADMIN — NIFEDIPINE 60 MG: 30 TABLET, FILM COATED, EXTENDED RELEASE ORAL at 08:18

## 2025-07-17 RX ADMIN — Medication 2.5 MILLION UNITS: at 19:22

## 2025-07-17 RX ADMIN — ONDANSETRON 4 MG: 2 INJECTION, SOLUTION INTRAMUSCULAR; INTRAVENOUS at 08:27

## 2025-07-17 RX ADMIN — VALACYCLOVIR HYDROCHLORIDE 500 MG: 500 TABLET, FILM COATED ORAL at 08:18

## 2025-07-17 RX ADMIN — NALBUPHINE HYDROCHLORIDE 10 MG: 10 INJECTION, SOLUTION INTRAMUSCULAR; INTRAVENOUS; SUBCUTANEOUS at 12:52

## 2025-07-17 RX ADMIN — Medication 2.5 MILLION UNITS: at 06:08

## 2025-07-17 RX ADMIN — LIDOCAINE HYDROCHLORIDE 3 ML: 10 INJECTION, SOLUTION EPIDURAL; INFILTRATION; INTRACAUDAL; PERINEURAL at 14:36

## 2025-07-17 RX ADMIN — Medication 2.5 MILLION UNITS: at 10:00

## 2025-07-17 RX ADMIN — OXYTOCIN 87.3 MILLI-UNITS/MIN: 10 INJECTION, SOLUTION INTRAMUSCULAR; INTRAVENOUS at 23:16

## 2025-07-17 RX ADMIN — ROPIVACAINE HYDROCHLORIDE 10 ML/HR: 2 INJECTION, SOLUTION EPIDURAL; INFILTRATION at 14:47

## 2025-07-17 RX ADMIN — ONDANSETRON 4 MG: 2 INJECTION INTRAMUSCULAR; INTRAVENOUS at 23:41

## 2025-07-17 RX ADMIN — Medication 2.5 MILLION UNITS: at 02:20

## 2025-07-17 RX ADMIN — NALBUPHINE HYDROCHLORIDE 10 MG: 10 INJECTION, SOLUTION INTRAMUSCULAR; INTRAVENOUS; SUBCUTANEOUS at 00:02

## 2025-07-17 RX ADMIN — Medication 2.5 MILLION UNITS: at 14:48

## 2025-07-17 RX ADMIN — NALBUPHINE HYDROCHLORIDE 10 MG: 10 INJECTION, SOLUTION INTRAMUSCULAR; INTRAVENOUS; SUBCUTANEOUS at 08:14

## 2025-07-17 ASSESSMENT — PAIN DESCRIPTION - ORIENTATION
ORIENTATION: LOWER
ORIENTATION: LOWER

## 2025-07-17 ASSESSMENT — PAIN SCALES - GENERAL
PAINLEVEL_OUTOF10: 6
PAINLEVEL_OUTOF10: 8

## 2025-07-17 ASSESSMENT — PAIN DESCRIPTION - LOCATION
LOCATION: ABDOMEN;BACK
LOCATION: ABDOMEN

## 2025-07-17 ASSESSMENT — PAIN DESCRIPTION - DESCRIPTORS
DESCRIPTORS: CRAMPING
DESCRIPTORS: CRAMPING

## 2025-07-17 NOTE — ANESTHESIA PROCEDURE NOTES
Epidural Block    Patient location during procedure: OB  Reason for block: labor epidural  Staffing  Performed: resident/CRNA   Anesthesiologist: Karla Estevez MD  Resident/CRNA: Delfino Padilla APRN - CRNA  Performed by: Delfino Padilla APRN - CRNA  Authorized by: Karla Estevez MD    Epidural  Patient position: sitting  Prep: Betadine  Patient monitoring: continuous pulse ox and frequent blood pressure checks  Approach: midline  Location: L3-4  Injection technique: ELISE air  Guidance: paresthesia technique  Provider prep: mask and sterile gloves  Needle  Needle type: Tuohy   Needle gauge: 17 G  Needle length: 3.5 in  Needle insertion depth: 9 cm  Catheter type: stylet  Catheter size: 19 G  Catheter at skin depth: 15 cm  Test dose: negativeCatheter Secured: tegaderm and tape  Assessment  Sensory level: T8  Hemodynamics: stable  Attempts: 1  Outcomes: uncomplicated and patient tolerated procedure well  Preanesthetic Checklist  Completed: patient identified, IV checked, site marked, risks and benefits discussed, surgical/procedural consents, equipment checked, pre-op evaluation, timeout performed, anesthesia consent given, oxygen available, monitors applied/VS acknowledged, fire risk safety assessment completed and verbalized and blood product R/B/A discussed and consented

## 2025-07-17 NOTE — PROGRESS NOTES
Labor Progress Note    Hodan Renteria is a 24 y.o. female  at 37w0d  The patient was seen and examined. Her pain is not well controlled. States that she wants an epidural, however she wants to wait until she has progressed further. Offered morphine/compazine and Nubain but patient declined. Patient is feeling increased pressures in her pelvis and pain with contractions. She reports fetal movement is present, denies contractions, denies loss of fluid, denies vaginal bleeding.       Vital Signs:  BP (!) 153/98   Pulse 92   Temp 97.9 °F (36.6 °C) (Oral)   Resp 17   LMP  (LMP Unknown)   SpO2 100%       FHT: 135, moderate variability, accelerations present, decelerations absent  Contractions: irregular, every 3 minutes      Pitocin: @ 2 mu/min    Membranes: Intact  Scalp Electrode in place: absent  Intrauterine Pressure Catheter in Place: absent    Interventions: none    Assessment/Plan:  Hodan Renteria is a 24 y.o. female  at 37w0d admitted for IOL 2/2 FGR (EFW <10th) w/ cHTN (meds)   - GBS bacteruria, Pen G for GBS prophylaxis   - cEFM/TOCO: Cat 1 w/ irregular uterine contractions   - Membranes in tact   - SVE /-2   - S/p Cytotec 25mcg PO x2   - Conde balloon in place and filled with 60 mL NS w/out difficulty   - Low dose pitocin ordered    - Continue to monitor    cHTN (on meds)   - BP consistently elevated, non-severe  - Received Procardia XL 60 mg on .    - PreE labs wnl, P/C 0.11   - Continue to monitor        Attending updated and in agreement with plan    Kelly Duval DO  Ob/Gyn Resident  2025, 8:08 PM

## 2025-07-17 NOTE — FLOWSHEET NOTE
1425 Nghia BOND CRNA at bedside. 1425 positioned for epidural  1439 catheter placed.  1440 test dose given. 1443 loading dose given. 1445 positioned to low fowlers with left uterine displacement. 1447 pump initiated.

## 2025-07-17 NOTE — ANESTHESIA PRE PROCEDURE
125 mL/hr at 07/17/25 1255 New Bag at 07/17/25 1255    lactated ringers bolus 500 mL  500 mL IntraVENous PRN Bonita Arias DO        Or    lactated ringers bolus 1,000 mL  1,000 mL IntraVENous PRN Bonita Arias, DO        sodium chloride flush 0.9 % injection 5-40 mL  5-40 mL IntraVENous 2 times per day Bonita Arias,         sodium chloride flush 0.9 % injection 5-40 mL  5-40 mL IntraVENous PRN Bonita Arias, DO        0.9 % sodium chloride infusion   IntraVENous PRN Bonita Arias, DO        loperamide (IMODIUM) capsule 2 mg  2 mg Oral PRN Bonita Arias, DO        acetaminophen (TYLENOL) tablet 1,000 mg  1,000 mg Oral Q6H PRN Bonita Arias, DO   1,000 mg at 07/16/25 1921    benzocaine-menthol (DERMOPLAST) 20-0.5 % spray   Topical PRN Bonita Arias,         penicillin G potassium 2.5 million units in 0.9% sodium chloride 100 mL IVPB  2.5 Million Units IntraVENous Q4H Mi San DO   Stopped at 07/17/25 1031    valACYclovir (VALTREX) tablet 500 mg  500 mg Oral BID Mi San DO   500 mg at 07/17/25 0818    docusate sodium (COLACE) capsule 100 mg  100 mg Oral BID PRN Elida Alcantara DO        NIFEdipine (PROCARDIA XL) extended release tablet 60 mg  60 mg Oral Daily Elida Alcantara DO   60 mg at 07/17/25 0818    oxytocin (PITOCIN) 30 units in 500 mL infusion  1-20 german-units/min IntraVENous Continuous Vonmacvira Kelly A, DO 8 mL/hr at 07/17/25 0632 8 german-units/min at 07/17/25 0632       Allergies:  No Known Allergies    Problem List:    Patient Active Problem List   Diagnosis Code    Hx recurrent SAB N96    Exposure to genital herpes (current partner) Z20.2    cHTN (meds) O10.919    Hx FGR (G4) Z87.59    TAB x1 Z33.2    Dysmenorrhea N94.6    THC Use F12.91    Pregravid BMI 30.12 Z68.30    Patient declines all vaccines in pregnancy Z28.21    Sixth pregnancy Z34.90    Cocaine use F14.90    Right ovarian simple cyst N83.201    Group B Streptococcus urinary tract infection affecting pregnancy in second

## 2025-07-17 NOTE — PROGRESS NOTES
[facility]... Labor Progress Note    Hodan Renteria is a 24 y.o. female  at 37w0d  The patient was seen and examined. Patient's lehman balloon spontaneously came out. Patient states that she felt a gush of fluid after the lehman balloon fell out.   Her pain is not well controlled. She reports fetal movement is present, complains of contractions, complains of loss of fluid, denies vaginal bleeding.     Vital Signs:  /71   Pulse 83   Temp 97.9 °F (36.6 °C) (Oral)   Resp 19   LMP  (LMP Unknown)   SpO2 100%       FHT: 145, moderate variability, accelerations present, decelerations absent  Contractions: irregular, every 2-4  minutes    Chaperone for Intimate Exam: Chaperone was present for entire exam, Chaperone Name: SAI Thompson  Cervical Exam: 4 cm dilated, 50 effaced, -1 station  Pelvic exam: Normal appearing vagina and cervix. No gross rupture of membranes;  No pooling; No leakage of fluid on valsalva; No bleeding.  Pitocin: @ 4 mu/min    Membranes: Intact  Scalp Electrode in place: absent  Intrauterine Pressure Catheter in Place: absent    Interventions: SVE    Assessment/Plan:  Hodan Renteria is a 24 y.o. female  at 37w0d admitted for IOL 2/2 FGR   - GBS bacteruria, Pen G for GBS prophylaxis   - VSS   - SVE /-1   - cEFM/TOCO: Cat 1, irregular contractions   - Membranes in tact   - Pelvic exam not concerning for SROM   - Nitrazine positive   - Ferning negative   - S/p Cytotec x2   - S/p spontaneous removal of lehman   - Continue pitocin per protocol    cHTN (on meds)   - BP consistently elevated, non-severe   - Has received Procardia 60 mg XL on    - PreE labs wnl, P/C 0.11   - Continue to monitor closely.    Attending updated and in agreement with plan    Kelly Duval DO  Ob/Gyn Resident  2025, 9:14 PM

## 2025-07-17 NOTE — PROGRESS NOTES
Labor Progress Note    Hodan Renteria is a 24 y.o. female  at 37w1d  The patient was seen and examined. Her pain is well controlled. She reports fetal movement is present, complains of contractions, denies loss of fluid, denies vaginal bleeding.      Discussed with patient recommendation for AROM to continue her induction. Risks, benefits, alternatives were discussed. Patient was amenable to proceeding with AROM.     Vital Signs:  Vitals:    25 0931 25 1001 25 1033 25 1100   BP: 127/78 (!) 114/51 (!) 106/50 116/63   Pulse: 69 77 77 88   Resp: 16 16 16 16   Temp:       TempSrc:       SpO2:            FHT: 130, moderate variability, accelerations present, decelerations absent  Contractions: regular, every 2-3 minutes    Chaperone for Intimate Exam: Chaperone was present for entire exam, Chaperone Name: SAI Victor  Cervical Exam: /-1  Pitocin: @ 8 mu/min    Membranes: Ruptured clear fluid  Scalp Electrode in place: absent  Intrauterine Pressure Catheter in Place: absent    Interventions: SVE; AROM    Assessment/Plan:  Hodan Renteria is a 24 y.o. female  at 37w1d admitted for IOL 2/2 FGR (EFW<10%tile) w/cHTN (meds)   - GBS bacteruria, Pen G for GBS prophylaxis   - cEFM: category I, contractions every 2-3 minutes   - VSS, afebrile   - SVE: /-1   - S/p Cytotec 25mcg PO x2   - S/p spontaneous removal of lehman   - Continue pitocin per protocol   - Counseled patient on AROM and patient was amenable. Risks, benefits, alternatives discussed.    - AROM successful, clear fluid. Patient tolerated the procedure well.    cHTN (meds)  - BP intermittently elevated, non-severe  - Denies s/sx PreE  - Currently on Procardia XL 60mg qD  - PreE labs wnl, P/C 0.11  - Will continue to monitor closely    Attending updated and in agreement with plan    Bonita Arias DO  Ob/Gyn Resident  2025, 11:34 AM

## 2025-07-17 NOTE — PROGRESS NOTES
Labor Progress Note    Hodan Renteria is a 24 y.o. female  at 37w1d  The patient was seen and examined. Her pain is well controlled. She reports fetal movement is present, complains of contractions, complains of loss of fluid, denies vaginal bleeding. Patient states that she felt a \"pop\" when she went to the bathroom and thought her water broke. Reported leakage of fluid in the bed.     Vital Signs:  BP (!) 147/86   Pulse 74   Temp 98.2 °F (36.8 °C) (Oral)   Resp 16   LMP  (LMP Unknown)   SpO2 100%       FHT: 130, moderate variability, accelerations present, decelerations absent  Contractions: regular, every 2-3 minutes    Chaperone for Intimate Exam: Chaperone was present   for entire exam, Chaperone Name: SAI Thompson  Cervical Exam: 4 cm dilated, 50 effaced, -1 station  Pitocin: @ 5 mu/min    Membranes: Intact  Scalp Electrode in place: absent  Intrauterine Pressure Catheter in Place: absent    Interventions: SVE    Assessment/Plan:  Hodan Renteria is a 24 y.o. female  at 37w1d admitted for IOL 2/2 FGR (EFW <10%tile) w/ cHTN (meds)   - GBS bacteruria, Pen G for GBS prophylaxis   - cEFM/TOCO: Cat 1 with regular contractions   - Pt was complaining of a gush of fluid and a \"pop.\" On SVE, membranes in tact and no gush of fluid noted.    - Intermittent elevated, nonsevere BPs, otherwise VSS, afebrile   - LR IVF @125 mL/hr   - Membranes in tact   - S/p Cytotec 25 mcg PO x2, lehman balloon   - SVE /-2   - Pain control: Nubain x1   - Continue pitocin per protocol   - Plan to AROM at next check        Attending updated and in agreement with plan    Kelly Duval DO  Ob/Gyn Resident  2025, 3:11 AM

## 2025-07-17 NOTE — PROGRESS NOTES
Labor Progress Note    Hodan Renteria is a 24 y.o. female  at 37w1d  The patient was seen and examined. Her pain is well controlled. She reports fetal movement is present, complains of contractions, complains of loss of fluid, denies vaginal bleeding. Patient resting comfortably with epidural, no complaints.    Vital Signs:  Vitals:    25 1631 25 1632 25 1701 25 1731   BP:   (!) 159/95 (!) 156/65   Pulse:   85 (!) 108   Resp: 16 16 16 16   Temp:       TempSrc:       SpO2:    99%        FHT: 135, moderate variability, accelerations present, decelerations absent  Contractions: Irregular, every 3-6 minutes    Chaperone for Intimate Exam: Chaperone was present for entire exam, Chaperone Name: RN  Cervical Exam: 5 cm dilated, 70 effaced, -1 station  Pitocin: @ 8 mu/min    Membranes: Ruptured clear fluid  Scalp Electrode in place: absent  Intrauterine Pressure Catheter in Place: absent    Interventions: SVE    Assessment/Plan:  Hodan Renteria is a 24 y.o. female  at 37w1d admitted for IOL 2/2 FGR (EFW<10%tile) w/ cHTN (meds)   - GBS bacteruria, Pen G for GBS prophylaxis   - cEFM: category 1, contractions every 3-6 minutes   - SVE: 570/-1   - S/p Cytotec 25mcg PO x2   - S/p spontaneous removal of lehman   - S/p AROM (clear) @ 1128   - Continue pitocin per protocol   - Pain control: Nubain x3, epidural    - Continue to monitor    cHTN (meds)    - BP consistently elevated with 1 severe range BP  - Ordered additional 30mg Procardia XL PO x1   - Patient currently on Procardia 60mg XL qD. Will increase to 90mg qD   - Denies s/sx PreE    - PreE labs wnl, P/C 0.11   - Will continue to closely monitor    Attending updated and in agreement with plan    Bonita Arias DO  Ob/Gyn Resident  2025, 5:39 PM     Resident Physician Statement  I have discussed the case, including pertinent history and exam findings with the above. I have personally seen the patient. I agree with the assessment,

## 2025-07-17 NOTE — PROGRESS NOTES
Labor Progress Note    Hodan Renteria is a 24 y.o. female  at 37w1d  The patient was seen and examined. Her pain is well controlled. She reports fetal movement is present, complains of contractions, complains of loss of fluid, denies vaginal bleeding.       Vital Signs:  /72   Pulse 90   Temp 99 °F (37.2 °C) (Oral)   Resp 16   LMP  (LMP Unknown)   SpO2 100%       FHT: 135, moderate variability, accelerations present, decelerations absent  Contractions: irregular, every 3-4 minutes    Chaperone for Intimate Exam: Chaperone was present for entire exam, Chaperone Name:   Cervical Exam: 6 cm dilated, 90 effaced, 0 station  Pitocin: @ 14 mu/min    Membranes: Ruptured clear fluid  Scalp Electrode in place: absent  Intrauterine Pressure Catheter in Place: absent    Interventions: SVE    Assessment/Plan:  Hodan Renteria is a 24 y.o. female  at 37w1d admitted for IOL 2/2 FGR (EFW<10%tile) w/cHTN (meds)   - GBS bacteruria, Pen G for GBS prophylaxis   - VSS, afebrile   - cEFM/TOCO: Cat 1 with irregular contractions   - SVE /-1   - S/p Cytotec Pox2  - S/p spontaneous removal of lehman   - S/p AROM   - Pain control: Nubain x3, epidural   - Continue pitocin per protocol    cHTN (meds)   - BP intermittently elevated, non-severe   - Denies s/sx of PreE   - Continue on Procardia XL 90mg qD   - PreE labs wnl, P/C 0.11   - Will continue to monitor closely    Attending updated and in agreement with plan    Kelly Duvla DO  Ob/Gyn Resident  2025, 6:56 PM

## 2025-07-17 NOTE — PROGRESS NOTES
Labor Progress Note    Hodan Renteria is a 24 y.o. female  at 37w1d  The patient was seen and examined. Her pain is well controlled. She reports fetal movement is present, complains of contractions, complains of loss of fluid, denies vaginal bleeding.       Vital Signs:  BP (!) 141/78   Pulse 86   Temp 98.6 °F (37 °C) (Oral)   Resp 18   LMP  (LMP Unknown)   SpO2 100%       FHT: 135, moderate variability, accelerations present, decelerations absent  Contractions: regular, every 4 minutes    Chaperone for Intimate Exam: Chaperone was present for entire exam, Chaperone Name: SAI Gastelum  Cervical Exam: 6 cm dilated, 90 effaced, -1 station  Pitocin: @ 14 mu/min    Membranes: Ruptured clear fluid  Scalp Electrode in place: absent  Intrauterine Pressure Catheter in Place: absent    Interventions: SVE    Assessment/Plan:  Hodan Renteria is a 24 y.o. female  at 37w1d admitted for IOL 2/2 FGR (EFW <10%tile) w/cHTN (meds)   - GBS bacteruria, Pen G for GBS prophylaxis   - VSS, afebrile   - cEFM/TOCO: Cat with regular contraction   - SVE 6/90/0   - S/p Cytotec PO x2   - S/p spontaneous removal of lehman   - S/p AROM (clr) @1128   - Pain control: Nubain x3, epidural   - Continue pitocin per protocol  cTN (meds)   - BP intermittently elevated, one severe 152/79   - Denies s/sx of PreE   - Continue on Procardia XL 90mg qD   - PreE labs wnl, P/C 0.11   - Will continue to monitor closely      Attending updated and in agreement with plan    Kelly Duval DO  Ob/Gyn Resident  2025, 7:35 PM

## 2025-07-17 NOTE — PROGRESS NOTES
Obstetric/Gynecology Resident Interval Note    Tracing reviewed. Patient recently had epidural placed.    Fetal Heart Monitor:  Baseline Heart Rate 130, moderate variability, present accelerations, absent decelerations  [place]: contractions, irregular, every 2-5 minutes    Tracing category 1. Pitocin at 8 mu/min. Continue pitocin per protocol.     Kaykay Rajan MD  OB/GYN Resident, PGY2  [place], [place]  7/17/2025, 3:48 PM

## 2025-07-17 NOTE — PROGRESS NOTES
Labor Progress Note    Hodan Renteria is a 24 y.o. female  at 37w1d  The patient was seen and examined. Her pain is well controlled with Nubain. She reports fetal movement is present, complains of contractions, denies loss of fluid, denies vaginal bleeding.       Vital Signs:  Vitals:    25 2101 25 2201 25 2300 25 0000   BP: 136/71 97/74 130/79 (!) 133/90   Pulse: 83 98 85 97   Resp:  16   Temp:    98.8 °F (37.1 °C)   TempSrc:    Oral   SpO2:             FHT: Baseline 130, moderate variability, accelerations present, decelerations absent, Category 1 tracing  Contractions: regular, every 1-2 minutes  Cervical Exam: 4 cm dilated, 50 effaced, -2 station  Chaperone for Intimate Exam: Chaperone was present for entire exam, Chaperone Name: Donna GIBBS  Pitocin: @ 10 > 5 mu/min    Membranes: Intact  Scalp Electrode in place: absent  Intrauterine Pressure Catheter in Place: absent    Interventions: SVE    Assessment/Plan:  Hodan Renteria is a 24 y.o. female  at 37w1d admitted for IOL 2/2 FGR (EFW<10%ile) w/ cHTN (meds)   - GBS positive, Pen G for GBS prophylaxis   - BP intermittently elevated non severe, otherwise VSS, afebrile    - LR IVF @ 125 mL/hr   - cEFM and TOCO: Cat 1 with regular contractions   - Membranes intact   - S/p Cytotec 25 mcg PO x2, Conde balloon  - SVE 4/50%/-2   - Pain control: nubain x1    - Continue pitocin per protocol, AROM when able  - Continue to monitor closely      Attending updated and in agreement with plan    Marlyn Ashton DO  Ob/Gyn Resident  [facility]   2025 12:50 AM

## 2025-07-18 VITALS
HEART RATE: 73 BPM | RESPIRATION RATE: 16 BRPM | SYSTOLIC BLOOD PRESSURE: 132 MMHG | DIASTOLIC BLOOD PRESSURE: 83 MMHG | OXYGEN SATURATION: 98 % | TEMPERATURE: 98.1 F

## 2025-07-18 LAB
HCT VFR BLD AUTO: 26.7 % (ref 36.3–47.1)
HGB BLD-MCNC: 8.7 G/DL (ref 11.9–15.1)

## 2025-07-18 PROCEDURE — 85014 HEMATOCRIT: CPT

## 2025-07-18 PROCEDURE — 6370000000 HC RX 637 (ALT 250 FOR IP)

## 2025-07-18 PROCEDURE — 2500000003 HC RX 250 WO HCPCS

## 2025-07-18 PROCEDURE — 85018 HEMOGLOBIN: CPT

## 2025-07-18 PROCEDURE — 36415 COLL VENOUS BLD VENIPUNCTURE: CPT

## 2025-07-18 RX ORDER — NIFEDIPINE 30 MG/1
30 TABLET, EXTENDED RELEASE ORAL ONCE
Status: COMPLETED | OUTPATIENT
Start: 2025-07-18 | End: 2025-07-18

## 2025-07-18 RX ORDER — SODIUM CHLORIDE 0.9 % (FLUSH) 0.9 %
5-40 SYRINGE (ML) INJECTION EVERY 12 HOURS SCHEDULED
Status: DISCONTINUED | OUTPATIENT
Start: 2025-07-18 | End: 2025-07-19 | Stop reason: HOSPADM

## 2025-07-18 RX ORDER — SENNA AND DOCUSATE SODIUM 50; 8.6 MG/1; MG/1
1 TABLET, FILM COATED ORAL DAILY
Status: DISCONTINUED | OUTPATIENT
Start: 2025-07-18 | End: 2025-07-18

## 2025-07-18 RX ORDER — ACETAMINOPHEN 500 MG
1000 TABLET ORAL EVERY 6 HOURS PRN
Status: DISCONTINUED | OUTPATIENT
Start: 2025-07-18 | End: 2025-07-18

## 2025-07-18 RX ORDER — SODIUM CHLORIDE 9 MG/ML
INJECTION, SOLUTION INTRAVENOUS PRN
Status: DISCONTINUED | OUTPATIENT
Start: 2025-07-18 | End: 2025-07-19 | Stop reason: HOSPADM

## 2025-07-18 RX ORDER — FERROUS SULFATE 325(65) MG
325 TABLET ORAL 2 TIMES DAILY
Qty: 60 TABLET | Refills: 1 | Status: SHIPPED | OUTPATIENT
Start: 2025-07-18

## 2025-07-18 RX ORDER — SIMETHICONE 80 MG
80 TABLET,CHEWABLE ORAL EVERY 6 HOURS PRN
Status: DISCONTINUED | OUTPATIENT
Start: 2025-07-18 | End: 2025-07-19 | Stop reason: HOSPADM

## 2025-07-18 RX ORDER — LANOLIN
CREAM (ML) TOPICAL PRN
Status: DISCONTINUED | OUTPATIENT
Start: 2025-07-18 | End: 2025-07-19 | Stop reason: HOSPADM

## 2025-07-18 RX ORDER — SENNA AND DOCUSATE SODIUM 50; 8.6 MG/1; MG/1
2 TABLET, FILM COATED ORAL 2 TIMES DAILY
Status: DISCONTINUED | OUTPATIENT
Start: 2025-07-18 | End: 2025-07-19 | Stop reason: HOSPADM

## 2025-07-18 RX ORDER — NIFEDIPINE 60 MG/1
120 TABLET, EXTENDED RELEASE ORAL DAILY
Qty: 180 TABLET | Refills: 2 | Status: SHIPPED | OUTPATIENT
Start: 2025-07-18

## 2025-07-18 RX ORDER — LOPERAMIDE HYDROCHLORIDE 2 MG/1
2 CAPSULE ORAL PRN
Status: DISCONTINUED | OUTPATIENT
Start: 2025-07-18 | End: 2025-07-19 | Stop reason: HOSPADM

## 2025-07-18 RX ORDER — BISACODYL 10 MG
10 SUPPOSITORY, RECTAL RECTAL DAILY PRN
Status: DISCONTINUED | OUTPATIENT
Start: 2025-07-18 | End: 2025-07-19 | Stop reason: HOSPADM

## 2025-07-18 RX ORDER — SODIUM CHLORIDE 0.9 % (FLUSH) 0.9 %
5-40 SYRINGE (ML) INJECTION PRN
Status: DISCONTINUED | OUTPATIENT
Start: 2025-07-18 | End: 2025-07-19 | Stop reason: HOSPADM

## 2025-07-18 RX ORDER — IBUPROFEN 600 MG/1
600 TABLET, FILM COATED ORAL EVERY 6 HOURS PRN
Status: DISCONTINUED | OUTPATIENT
Start: 2025-07-18 | End: 2025-07-18

## 2025-07-18 RX ORDER — IBUPROFEN 600 MG/1
600 TABLET, FILM COATED ORAL EVERY 6 HOURS
Status: DISCONTINUED | OUTPATIENT
Start: 2025-07-18 | End: 2025-07-19 | Stop reason: HOSPADM

## 2025-07-18 RX ORDER — NIFEDIPINE 30 MG/1
120 TABLET, EXTENDED RELEASE ORAL DAILY
Status: DISCONTINUED | OUTPATIENT
Start: 2025-07-19 | End: 2025-07-19 | Stop reason: HOSPADM

## 2025-07-18 RX ORDER — ACETAMINOPHEN 500 MG
1000 TABLET ORAL EVERY 6 HOURS
Status: DISCONTINUED | OUTPATIENT
Start: 2025-07-18 | End: 2025-07-19 | Stop reason: HOSPADM

## 2025-07-18 RX ADMIN — NIFEDIPINE 30 MG: 30 TABLET, FILM COATED, EXTENDED RELEASE ORAL at 18:03

## 2025-07-18 RX ADMIN — BENZOCAINE AND LEVOMENTHOL: 200; 5 SPRAY TOPICAL at 06:30

## 2025-07-18 RX ADMIN — IBUPROFEN 600 MG: 600 TABLET ORAL at 06:31

## 2025-07-18 RX ADMIN — NIFEDIPINE 90 MG: 30 TABLET, FILM COATED, EXTENDED RELEASE ORAL at 09:07

## 2025-07-18 RX ADMIN — WITCH HAZEL: 500 SOLUTION RECTAL; TOPICAL at 06:31

## 2025-07-18 RX ADMIN — SENNOSIDES AND DOCUSATE SODIUM 2 TABLET: 50; 8.6 TABLET ORAL at 09:07

## 2025-07-18 RX ADMIN — ACETAMINOPHEN 1000 MG: 500 TABLET ORAL at 06:31

## 2025-07-18 RX ADMIN — SODIUM CHLORIDE, PRESERVATIVE FREE 10 ML: 5 INJECTION INTRAVENOUS at 09:07

## 2025-07-18 RX ADMIN — IBUPROFEN 600 MG: 600 TABLET ORAL at 00:29

## 2025-07-18 RX ADMIN — ACETAMINOPHEN 1000 MG: 500 TABLET ORAL at 00:29

## 2025-07-18 ASSESSMENT — PAIN DESCRIPTION - DESCRIPTORS
DESCRIPTORS: DISCOMFORT;SORE
DESCRIPTORS: DISCOMFORT;CRAMPING

## 2025-07-18 ASSESSMENT — PAIN SCALES - GENERAL
PAINLEVEL_OUTOF10: 1
PAINLEVEL_OUTOF10: 4

## 2025-07-18 ASSESSMENT — PAIN DESCRIPTION - LOCATION
LOCATION: ABDOMEN
LOCATION: ABDOMEN;BACK

## 2025-07-18 ASSESSMENT — PAIN DESCRIPTION - ORIENTATION
ORIENTATION: MID;LOWER
ORIENTATION: MID;LOWER

## 2025-07-18 NOTE — CARE COORDINATION
CASE MANAGEMENT POST-PARTUM TRANSITIONAL CARE PLAN    37 weeks gestation of pregnancy [Z3A.37]    OB Provider: [facility]    Writer met w/ Hodan at her bedside to discuss DCP. She is S/P  on 2025    Female  to WIN    Writer verified address, her phone number and emergency contacts are correct on facesheet. She lives with MAXI Norton and their other child. Hodan denied barriers with transportation home, to doctor's appointments or with paying for medications upon discharge home.     [employer] insurance correct. Writer notified Hodan she has 30 days from date of birth to add  to insurance policy by contacting [facility].  She verbalized understanding. Her primary listed is an [place] BCBS plan which she states she never had.  Call to Registration Jessie and she will remove.  She states it has been verified, but she also sees a note that pt is requesting it be removed.      Infant name on BC: Nayely Norton  Infant PCP [facility] - mom to make appt.     DME: no  HOME CARE: no    Anticipated DC of mother and infant 1-2 days after .     [facility] RISK OF UNPLANNED READMISSION 2.0             6.8 Total Score

## 2025-07-18 NOTE — PROGRESS NOTES
Social Work    Consult received for THC use as well as prior cocaine use in early pregnancy.     Met with patient at bedside to complete consult and offer any assist or support.     This is her 2nd child Elise. She has a 2 yr old son at home. FOB is Tommy and they reside together.     Has all needed baby items and has a bassinet for safe sleep. Is linked with WIC, food stamps and [employer].     Reported she has excellent support from FOB.     Denied any anxiety or depression. Discussed if any symptoms arise to reach out to her OB.     Addressed positive THC and she admitted using to have an appetite. Explained BARON mandate and referral to [facility]. Also addressed positive cocaine in Feb. She reported that all stemmed from her mother residing in her home and using drugs. Patient had to call the police and have her mother removed. She has had no contact with her mother since April.     Referral called into [facility] intake and spoke with Nevin. Patient is cleared to discharge with the baby as there are no other safety concerns at this time.

## 2025-07-18 NOTE — ANESTHESIA POSTPROCEDURE EVALUATION
Department of Anesthesiology  Postprocedure Note    Patient: Hodan Renteria  MRN: [id number]  YOB: 2000  Date of evaluation: 7/17/2025    Procedure Summary       Date: 07/17/25 Room / Location:     Anesthesia Start: 1425 Anesthesia Stop: 2108    Procedure: Labor Analgesia Diagnosis:     Scheduled Providers:  Responsible Provider: Karla Estevez MD    Anesthesia Type: epidural ASA Status: 2            Anesthesia Type: No value filed.    Marisol Phase I:      Marisol Phase II:      Anesthesia Post Evaluation    Patient location during evaluation: floor  Patient participation: complete - patient participated  Level of consciousness: awake and alert  Pain score: 0  Airway patency: patent  Nausea & Vomiting: no nausea and no vomiting  Cardiovascular status: hemodynamically stable  Respiratory status: acceptable  Hydration status: euvolemic  Pain management: adequate        No notable events documented.

## 2025-07-18 NOTE — PROGRESS NOTES
POST PARTUM DAY # 1    Hodan Renteria is a 24 y.o. female  This patient was seen & examined today.  on 25    Her pregnancy was complicated by:   Patient Active Problem List   Diagnosis    Hx recurrent SAB    Exposure to genital herpes (current partner)    cHTN (meds)    Hx FGR (G4)    TAB x1    Dysmenorrhea    THC Use    Pregravid BMI 30.12    Patient declines all vaccines in pregnancy    Sixth pregnancy    Cocaine use    Right ovarian simple cyst    Group B Streptococcus urinary tract infection affecting pregnancy in second trimester    36 weeks gestation of pregnancy    High-risk pregnancy in third trimester    Elv 1 hr GTT, 3 hr ordered    Poor fetal growth affecting management of mother in third trimester    37 weeks gestation of pregnancy       Today she is doing well without any chief complaint. Her lochia is light. She denies chest pain, shortness of breath, headache, lightheadedness and blurred vision. She is bottle feeding and she denies any breast tenderness. She is ambulating well. Her voiding pattern is normal. I reviewed signs and symptoms of post partum depression with the patient, she currently denies any of these symptoms. She is tolerating solids.     Vital Signs:  /89   Pulse 82   Temp 100 °F (37.8 °C) (Oral)   Resp 16   LMP  (LMP Unknown)   SpO2 98%   Breastfeeding Unknown      Physical Exam:  General:  no apparent distress, alert and cooperative  Neurologic:  alert, oriented, normal speech, no focal findings or movement disorder noted  Lungs:  No increased work of breathing  Heart:  Regulate rate  Abdomen: abdomen soft, non-distended, non-tender  Fundus: non-tender, firm, below umbilicus  Extremities:  no calf tenderness, non edematous    Lab:  Lab Results   Component Value Date    HGB 9.8 (L) 2025     Lab Results   Component Value Date    HCT 30.9 (L) 2025       Assessment/Plan:  Hodan Renteria is a  PPD # 1 s/p    - Doing WELL, VSS   - female infant

## 2025-07-18 NOTE — L&D DELIVERY NOTE
Vaginal Delivery Note  Department of Obstetrics and Gynecology  [facility]       Patient: Hodan Renteria   : 2000  MRN: [id number]   Date of delivery: 25     Pre-operative Diagnosis: Hodan Renteria  at 37w1d  Anemia  Hx HSV (oral only)  THC use (UDS+)  BMI 31    Post-operative Diagnosis:  living infant female  Anemia  Hx HSV (oral only)  THC use (UDS+)  BMI 31    Delivering Obstetrician & Assistant(s): Dr. Sid MD; Dr. Mcfarland;  Kelly Duval DO    Infant Information:   Information for the patient's :  Peter Renteria [[id number]]      Information for the patient's :  Peter Renteria [[id number]]        Apgar scores: 8 at 1 minute and 9 at 5 minutes.     Anesthesia:  epidural anesthesia    Application and Delivery:    She was known to be GBS bacteruria and receive PenG antibiotic prophylaxis.     The patient progressed well, receive an epidural, became complete and felt the urge to push. After pushing with contractions the fetal head delivered Cephalic, occiput posterior over an intact perineum, nuchal cord was not present.  The anterior, then posterior shoulder delivered easily and atraumatically followed by the rest of the infant. Nose and mouth suctioned with bulb suction, infant was stimulated and dried. Cord was clamped and cut after one minute delayed cord clamping  and infant was placed with mom, and attended by SAI Gastelum for evaluation. The delivery of the placenta was manual removal and appeared incomplete. Placenta came out in two pieces, approximately the same size. Pitocin was started. The vagina was swept of all clots and debris. The perineum and vagina were evaluated and no lacerations were found. Mother and baby tolerated procedure well.     Dr. Lau was present for entire delivery.    Delivery Summary:  Labor & Delivery Summary  Dilation Complete Date: 25  Dilation Complete Time:     Specimen: placenta sent to

## 2025-07-18 NOTE — PLAN OF CARE
Problem: Pain  Goal: Verbalizes/displays adequate comfort level or baseline comfort level  Outcome: Progressing     Problem: Safety - Adult  Goal: Free from fall injury  Outcome: Progressing     Problem: Postpartum  Goal: Experiences normal postpartum course  Description:  Postpartum OB-Pregnancy care plan goal which identifies if the mother is experiencing a normal postpartum course  Outcome: Progressing  Goal: Appropriate maternal -  bonding  Description:  Postpartum OB-Pregnancy care plan goal which identifies if the mother and  are bonding appropriately  Outcome: Progressing  Goal: Establishment of infant feeding pattern  Description:  Postpartum OB-Pregnancy care plan goal which identifies if the mother is establishing a feeding pattern with their   Outcome: Progressing  Goal: Incisions, wounds, or drain sites healing without S/S of infection  Outcome: Progressing     Problem: Infection - Adult  Goal: Absence of infection at discharge  Outcome: Progressing  Goal: Absence of infection during hospitalization  Outcome: Progressing  Goal: Absence of fever/infection during anticipated neutropenic period  Outcome: Progressing     Problem: Discharge Planning  Goal: Discharge to home or other facility with appropriate resources  Outcome: Progressing     Problem: Chronic Conditions and Co-morbidities  Goal: Patient's chronic conditions and co-morbidity symptoms are monitored and maintained or improved  Outcome: Progressing

## 2025-07-19 NOTE — PLAN OF CARE
Problem: Pain  Goal: Verbalizes/displays adequate comfort level or baseline comfort level  Outcome: Completed     Problem: Safety - Adult  Goal: Free from fall injury  Outcome: Completed     Problem: Postpartum  Goal: Experiences normal postpartum course  Description:  Postpartum OB-Pregnancy care plan goal which identifies if the mother is experiencing a normal postpartum course  Outcome: Completed  Goal: Appropriate maternal -  bonding  Description:  Postpartum OB-Pregnancy care plan goal which identifies if the mother and  are bonding appropriately  Outcome: Completed  Goal: Establishment of infant feeding pattern  Description:  Postpartum OB-Pregnancy care plan goal which identifies if the mother is establishing a feeding pattern with their   Outcome: Completed  Goal: Incisions, wounds, or drain sites healing without S/S of infection  Outcome: Completed     Problem: Infection - Adult  Goal: Absence of infection at discharge  Outcome: Completed  Goal: Absence of infection during hospitalization  Outcome: Completed  Goal: Absence of fever/infection during anticipated neutropenic period  Outcome: Completed     Problem: Discharge Planning  Goal: Discharge to home or other facility with appropriate resources  Outcome: Completed     Problem: Chronic Conditions and Co-morbidities  Goal: Patient's chronic conditions and co-morbidity symptoms are monitored and maintained or improved  Outcome: Completed

## 2025-07-19 NOTE — CARE COORDINATION
Discharge Report    [facility]  Clinical Case Management Department  Written by: Apoorva Michaels RN    Patient Name: Hodan Renteria  Attending Provider: No att. providers found  Admit Date: 2025  7:40 AM  MRN: [id number]  Account: [id number]                     : 2000  Discharge Date: 2025    Disposition: home    Baby remains IP in Nursery    Message left for Tyree at [facility] that patient needs a 3 day BP check appt on 2025.    Apoorva Michaels RN

## 2025-07-19 NOTE — FLOWSHEET NOTE
Writer sat with pt and went over discharge paperwork and education. Pt knows that she needs to schedule a follow up appointment with her OB. Pt aware of the medications that she needs to be taking and how to take them. Post birth warning signs education complete. IV removed.Pt educated on checking BP and BP cuff and log sheet provided. Enough supplies given to patient for a couple days. Pt doesn't have any questions or concerns at this time. Pt walked off unit with her partner.

## 2025-07-22 LAB — SURGICAL PATHOLOGY REPORT: NORMAL
